# Patient Record
Sex: FEMALE | Race: WHITE | Employment: UNEMPLOYED | URBAN - METROPOLITAN AREA
[De-identification: names, ages, dates, MRNs, and addresses within clinical notes are randomized per-mention and may not be internally consistent; named-entity substitution may affect disease eponyms.]

---

## 2017-01-17 ENCOUNTER — TELEPHONE (OUTPATIENT)
Dept: ONCOLOGY | Age: 37
End: 2017-01-17

## 2017-01-17 ENCOUNTER — OFFICE VISIT (OUTPATIENT)
Dept: ENDOCRINOLOGY | Age: 37
End: 2017-01-17

## 2017-01-17 VITALS
DIASTOLIC BLOOD PRESSURE: 66 MMHG | HEART RATE: 77 BPM | SYSTOLIC BLOOD PRESSURE: 116 MMHG | RESPIRATION RATE: 16 BRPM | BODY MASS INDEX: 40.46 KG/M2 | OXYGEN SATURATION: 98 % | WEIGHT: 289 LBS | HEIGHT: 71 IN

## 2017-01-17 DIAGNOSIS — E66.01 OBESITY, MORBID, BMI 40.0-49.9 (HCC): ICD-10-CM

## 2017-01-17 DIAGNOSIS — Z87.42 HISTORY OF IRREGULAR MENSTRUAL CYCLES: ICD-10-CM

## 2017-01-17 DIAGNOSIS — Z87.898 HISTORY OF PREDIABETES: ICD-10-CM

## 2017-01-17 DIAGNOSIS — D3A.026 RECTAL CARCINOID TUMOR: ICD-10-CM

## 2017-01-17 DIAGNOSIS — E03.9 ACQUIRED HYPOTHYROIDISM: Primary | ICD-10-CM

## 2017-01-17 RX ORDER — LANOLIN ALCOHOL/MO/W.PET/CERES
400 CREAM (GRAM) TOPICAL DAILY
COMMUNITY
End: 2017-07-25

## 2017-01-17 RX ORDER — OMEPRAZOLE 20 MG/1
CAPSULE, DELAYED RELEASE ORAL
Refills: 2 | COMMUNITY
Start: 2016-11-25 | End: 2017-01-17

## 2017-01-17 NOTE — PATIENT INSTRUCTIONS
I recommend researching a low-carbohydrate diet as this way of eating can help improve your blood sugars and also help you lose weight. It can also help decrease your needs for diabetes medications including insulin. Here are some resources you can use to educate yourself on the diet:  1. Diet Doctor Website:   JDP The[employer]SeatXiimo/diabetes   https://Contextors.bodaplanes/  2. A Low Carbohydrate, Ketogenic Diet Manual by Alexys Ruano  3. New Atkins for a New You by Alexys Ruano and Kelly Eng  4. http://Phraxis/blog/7-steps-to-healthy-low-carb-living/    ==================================================================     AIM FOR LESS THAN 20 GRAMS OF NET CARBS PER DAY (for the first 4 weeks)  Net carbs = total carbs (g) - fiber (g)  Read food labels! Avoid food with added sugar. Focus on eating mostly protein (meat, poultry, fish, shellfish, eggs), healthy fats (avocados, nuts, cheese, olive or coconut oil) and non-starchy vegetables (greens, carrots, tomatoes, bell peppers, broccoli, brussels sprouts, green beans, etc).  If you fill yourself up with these foods, you won't even want the carbs.     BREAKFAST: whole eggs, veggies, omelet, plain yogurt, lee, sausage   LUNCH: salad with meat/poultry, veggies, cheese, nuts; low carb wrap with veggies and meat  DINNER: any type of meat/poultry or seafood (without breading), non-starchy vegetables     GOOD LOW-CARB SNACK OPTIONS: string cheese, Babybel cheese, carrots and celery with Ranch dressing, nuts (almonds, pistachios, walnuts, etc), meat (snack size salami, ham, or turkey), pork skins    BEVERAGES: water, water, water  Other options: unsweet tea (okay to add a pack of Splenda if needed), sparkling water without calories (ex: [employer], Gracia, etc), coffee (unsweeted creamer is fine)    Alcohol: (always in moderation) - lower carb options include red or white wine and champagne (the drier, the better); light beers like Michelob Ultra; some liquors (just avoid the sweet mixers like juices and sodas)

## 2017-01-17 NOTE — MR AVS SNAPSHOT
Visit Information Date & Time Provider Department Dept. Phone Encounter #  
 1/17/2017 10:50 AM Valentino Rummer, MD [facility] [phone] [id number] Follow-up Instructions Return in about 6 weeks (around 2/28/2017). Upcoming Health Maintenance Date Due  
 PAP AKA CERVICAL CYTOLOGY 7/12/2018 DTaP/Tdap/Td series (2 - Td) 3/24/2025 Allergies as of 1/17/2017  Review Complete On: 1/17/2017 By: Roberto Berry No Known Allergies Current Immunizations  Reviewed on 8/10/2015 Name Date Tdap 3/24/2015 Not reviewed this visit You Were Diagnosed With   
  
 Codes Comments Acquired hypothyroidism    -  Primary ICD-10-CM: E03.9 ICD-9-CM: 244.9 History of prediabetes     ICD-10-CM: Z87.898 ICD-9-CM: V12.29 History of irregular menstrual cycles     ICD-10-CM: Z87.42 
ICD-9-CM: V13.29 BMI 36.0-36.9,adult     ICD-10-CM: K71.73 
ICD-9-CM: V85.36 Rectal carcinoid tumor     ICD-10-CM: D3A.026 
ICD-9-CM: 209.57 Vitals BP Pulse Resp Height(growth percentile) Weight(growth percentile) SpO2  
 116/66 77 16 5' 11\" (1.803 m) 289 lb (131.1 kg) 98% BMI OB Status Smoking Status 40.31 kg/m2 Having regular periods Former Smoker Vitals History BMI and BSA Data Body Mass Index Body Surface Area  
 40.31 kg/m 2 2.56 m 2 Preferred Pharmacy Pharmacy Name Phone [id number] [place] [address] [phone] Your Updated Medication List  
  
   
This list is accurate as of: 1/17/17 12:01 PM.  Always use your most recent med list.  
  
  
  
  
 ALPRAZolam 1 mg tablet Commonly known as:  Carletha Puller Take 0.5-1 Tabs by mouth daily as needed for Anxiety. cyanocobalamin 1,000 mcg tablet Take 1,000 mcg by mouth daily. magnesium oxide 400 mg tablet Commonly known as:  MAG-OX Take 400 mg by mouth daily. PERFECT IRON 25 mg iron Tab Generic drug:  iron, carbonyl Take 1 Tab by mouth daily. PROBIOTIC ACIDOPHILUS BEADS 2 billion cell Cap Generic drug:  L.acidoph-B.long-L.plant-B.lac Take  by mouth daily. psyllium Powd Commonly known as:  METAMUCIL Take 1 Dose by mouth daily. VITAMIN D3 2,000 unit Cap capsule Generic drug:  Cholecalciferol (Vitamin D3) Take  by mouth daily. We Performed the Following 17-OH PROGESTERONE LCMS [id number] CPT(R)] 5-HIAA, UR G1027191 CPT(R)] Comments:  
 24 hour urine BASIC METABOLIC PANEL (7) [18111 CPT(R)] CORTISOL, URINE FREE 24 HR [51568 CPT(R)] CREATININE, UR, 24 HR [28835 CPT(R)] FERRITIN [13471 CPT(R)] HEMOGLOBIN A1C WITH EAG [38154 CPT(R)] MAGNESIUM [id number] CPT(R)] PROLACTIN [48369 CPT(R)] TESTOSTERONE, TOTAL, FEMALE/CHILD [id number] CPT(R)] TSH AND FREE T4 [66363 CPT(R)] VITAMIN B12 [id number] CPT(R)] VITAMIN D, 25 HYDROXY [id number] CPT(R)] Follow-up Instructions Return in about 6 weeks (around 2/28/2017). Patient Instructions I recommend researching a low-carbohydrate diet as this way of eating can help improve your blood sugars and also help you lose weight. It can also help decrease your needs for diabetes medications including insulin. Here are some resources you can use to educate yourself on the diet: 1. Diet Doctor Website: 
 Mocha.cnSeat.Differential. Le[employer]/diabetes 
 https://steward.org/ 2. A Low Carbohydrate, Ketogenic Diet Manual by Sofía Orellana 3. New Atkins for a New You by Sofía Orellana and Becki Kowalski 4. http://Elucid [employer]imaging.Leap[employer]/blog/7-steps-to-healthy-low-carb-living/ 
 
================================================================== 
  
AIM FOR LESS THAN 20 GRAMS OF NET CARBS PER DAY (for the first 4 weeks) Net carbs = total carbs (g) - fiber (g) Read food labels! Avoid food with added sugar. Focus on eating mostly protein (meat, poultry, fish, shellfish, eggs), healthy fats (avocados, nuts, cheese, olive or coconut oil) and non-starchy vegetables (greens, carrots, tomatoes, bell peppers, broccoli, brussels sprouts, green beans, etc). If you fill yourself up with these foods, you won't even want the carbs. 
  
BREAKFAST: whole eggs, veggies, omelet, plain yogurt, lee, sausage LUNCH: salad with meat/poultry, veggies, cheese, nuts; low carb wrap with veggies and meat DINNER: any type of meat/poultry or seafood (without breading), non-starchy vegetables 
  
GOOD LOW-CARB SNACK OPTIONS: string cheese, Babybel cheese, carrots and celery with Ranch dressing, nuts (almonds, pistachios, walnuts, etc), meat (snack size salami, ham, or turkey), pork skins BEVERAGES: water, water, water Other options: unsweet tea (okay to add a pack of Splenda if needed), sparkling water without calories (ex: [place], Rosezetta Brown, etc), coffee (unsweeted creamer is fine) Alcohol: (always in moderation) - lower carb options include red or white wine and champagne (the drier, the better); light beers like Michelob Ultra; some liquors (just avoid the sweet mixers like juices and sodas) Introducing [facility]! Dear Jennifer Carrillo: Thank you for requesting a Summize account. Our records indicate that you already have an active Summize account. You can access your account anytime at [email or web address] Did you know that you can access your hospital and ER discharge instructions at any time in Summize? You can also review all of your test results from your hospital stay or ER visit. Additional Information If you have questions, please visit the Frequently Asked Questions section of the Summize website at [email or web address] Remember, Summize is NOT to be used for urgent needs. For medical emergencies, dial 911. Now available from your iPhone and Android! Please provide this summary of care documentation to your next provider. Your primary care clinician is listed as [facility]. If you have any questions after today's visit, please call [phone].

## 2017-01-17 NOTE — PROGRESS NOTES
Endocrinology Visit    Chief Complaint: fatigue, weight gain    History of Present Illness:  Asia Paez is a 31 y.o. female who returns for f/u of fatigue and obesity with abnormal weight gain. I saw her in initial consultation in October 2016 at which time I started a work-up to determine if there was a hormonal cause of her symptoms. Thyroid levels, thyroid antibodies, cortisol/ACTH, and testosterone levels all returned within normal limits. Vitamin D, B12, and ferritin were also normal. We did not start any specific therapies since no specific etiology was identified. In the interim, she quit her previous job and switched to a less stressful job. Now works from 4p to midnight. She reports still feeling fatigued and says it is hard for her to wake up in the morning despite getting a full night's sleep. Weight is up 13 lbs since her last visit. She has not resumed her exercise routine, feels too tired to go to the gym. She continues taking B12 1000mg daily, magnesium 400 mg daily, vitamin D3 6000 IU daily, and comfort iron - feels more fatigued if she skips a day. She also reports having a positive pregnancy test in the interim. Unfortunately several f/u UPTs and a serum hCG were negative. During the work-up by her Ob/Gyn, a prolactin level was checked and was found to be slightly elevated (results were not faxed to me and pt does not know the degree of elevation). She was not started on dopamine agonist therapy. Seeing Dr Harman Morales for ob/gyn now. She remains interested in fertility. She has a h/o rectal carcinoid s/p surgery in Oct 2014 but no chemo or XRT. It has been two years since her dx and most recent octreoscan showed no evidence of disease. Has f/u with Dr Sea Mitchell soon for surveillance studies. Maximum weight was 375 lbs and after her cancer diagnosis she had been working on weight loss - lost over 100 lbs over 2 years.  Achieved this by eating more of a plant based diet and avoiding fast food, processed foods, and sodas. Was working out with a  at [employer]. As above, she has been unable to work out for the past 6 months due to profound fatigue. Recent home sleep study was negative for sleep apnea. Menses are regular now but have been very irregular and heavy in the past. Feels the weight loss helped her cycles become more regular. Has tried IVF twice but has been unable to maintain the pregnancy (two miscarriages). TFTs checked in the past have been normal but she reports a personal and family history of hypothyroidism. No longer on any thyroid medication. Morning cortisol level was checked around 8am and was 8.7. Pt was using clobetasol cream for eczema at that time but is no longer using this medication. Repeat random cortisol level was normal.     Review of Systems:   General ROS: positive for  - weight gain (regained about 20 lbs total)  Ophthalmic ROS: negative  ENT ROS: negative  Endocrine ROS: negative for - hair pattern changes, polydipsia/polyuria or unexpected weight changes  Respiratory ROS: no cough, shortness of breath, or wheezing  Cardiovascular ROS: no chest pain or dyspnea on exertion  Gastrointestinal ROS: no abdominal pain, change in bowel habits (has chronic loose stools), or black or bloody stools  Genito-Urinary ROS: no dysuria, trouble voiding, or hematuria  Musculoskeletal ROS: negative  Neurological ROS: negative  Dermatological ROS: negative    Problem List:  Patient Active Problem List   Diagnosis Code    Pap smear for cervical cancer screening Z12.4    Ovarian cyst N83.209    Uterine polyp N84.0    Endometriosis N80.9    Septic shock(785.52)     Asthma J45.909    Miscarriage O03.9    In vitro fertilization Z31.83    Hypothyroid E03.9    Anxiety F41.9    Rectal carcinoid tumor D3A. 026    QT prolongation R94.31    Benign essential hypertension I10    Vitamin D deficiency E55.9    Hypertriglyceridemia E78.1    Incisional hernia K43.2    History of prediabetes Z87.898    BMI 36.0-36.9,adult Z68.36    Chronic fatigue R53.82    History of irregular menstrual cycles Z87.42       Past Medical History:    Past Medical History   Diagnosis Date    Anxiety     Asthma     Benign essential hypertension 3/24/15     resolved with weight loss and dietary changes    BMI 36.0-36.9,adult     Endometriosis     History of prediabetes      resolved with diet, exercise and wt loss    Hypertriglyceridemia 3/28/15    Hypothyroid      treated with Levothyroxine x3 months then stopped. TFT have been normal since    In vitro fertilization      x2    Miscarriage      x2    Ovarian cyst     QT prolongation      d/t Trazodone; resolved after Trazodone discontinued    Rectal carcinoid tumor 2014     stage IIIB; colonic resection, lymph nodes removed, ileostomy 10/2014 (Dr. Haylee Fregoso); ileostomy take-down 2/3/15. No chemo/XRT. Sees primarily Dr. Cynthia Rosenthal at [facility]. Also sees Dr. Yesenia Martínez here if needed    Septic shock(785.52) 3379     d/t complications from [facility] , laparoscopy - bowel was lacerated, exploratory surgery    Uterine polyp     Vitamin D deficiency 3/28/15       Past Surgical History:  Past Surgical History   Procedure Laterality Date    Hx polypectomy  2014 (x2)     2 rectal polyps (Dr. Fadumo Siddiqi, Dr. Israel Jean-Baptiste)    Hx colectomy  10/2014     rectosigmoid colon resection, ileostomy (Dr. Haylee Fregoso)    Hx gi  2/3/15     ileostomy take-down (Dr. Haylee Fregoso)    Hx gyn       laproscopy and D&C - bowel knicked thus had full exploratory    Hx gyn       IVF x 2    Hx gyn       A2 (miscarriage)    Hx other surgical  8/10/15     incisional hernia repair with mesh, lysis of adhesions (Dr. Haylee Fregoso)       Social History:  Social History     Social History    Marital status:      Spouse name: N/A    Number of children: N/A    Years of education: N/A     Occupational History    Not on file.      Social History Main Topics    Smoking status: Former Smoker     Quit date: 10/12/2000    Smokeless tobacco: Never Used      Comment: smoked cigarettes as a teenager - light smoker then    Alcohol use No    Drug use: No    Sexual activity: Yes     Partners: Male     Birth control/ protection: None     Other Topics Concern    Not on file     Social History Narrative    ** Merged History Encounter **         She is . No children. Works at [employer] One. Family History:  Family History   Problem Relation Age of Onset    Diabetes Mother     Hypertension Mother     Cancer Mother      uterine    Diabetes Father     Hypertension Father     Hypertension Brother     Diabetes Maternal Aunt     Heart Disease Maternal Aunt     Heart Attack Maternal Aunt     Diabetes Maternal Uncle     Heart Disease Maternal Uncle     Heart Attack Maternal Uncle     Stroke Paternal Grandmother     Colon Cancer Paternal Grandfather     Hypertension Brother     Other Brother      commited suicide    Cancer Maternal Aunt      colon     Cancer Maternal Aunt      lymphoma    Anesth Problems Neg Hx        Medications:     Current Outpatient Prescriptions:     magnesium oxide (MAG-OX) 400 mg tablet, Take 400 mg by mouth daily. , Disp: , Rfl:     L.acidoph-B.long-L.plant-B.lac (PROBIOTIC ACIDOPHILUS BEADS) 2 billion cell cap, Take  by mouth daily. , Disp: , Rfl:     iron, carbonyl (PERFECT IRON) 25 mg iron tab, Take 1 Tab by mouth daily. , Disp: , Rfl:     ALPRAZolam (XANAX) 1 mg tablet, Take 0.5-1 Tabs by mouth daily as needed for Anxiety. , Disp: 30 Tab, Rfl: 0    cyanocobalamin 1,000 mcg tablet, Take 1,000 mcg by mouth daily. , Disp: , Rfl:     psyllium (METAMUCIL) powd, Take 1 Dose by mouth daily. , Disp: , Rfl:     Cholecalciferol, Vitamin D3, (VITAMIN D3) 2,000 unit cap capsule, Take  by mouth daily. , Disp: , Rfl:     omeprazole (PRILOSEC) 20 mg capsule, TAKE ONE CAPSULE BY MOUTH EVERY DAY, Disp: , Rfl: 2    Allergies:  No Known Allergies    Physical Examination:  Visit Vitals    /66    Pulse 77    Resp 16    Ht 5' 11\" (1.803 m)    Wt 289 lb (131.1 kg)    SpO2 98%    BMI 40.31 kg/m2     Gen: no acute distress  HEENT: mucous membranes moist, no Cushionoid or acromegalic features  Thyroid: no enlargement or nodules noted  CAD: normal rate, regular rhythm. No murmur rubs or gallops  PULM: clear to ausculation, no wheezes, rhonchis or rales. GI: soft non tender, non distended, + flesh colored striae but no violaceous striae; multiple well-healed scars  EXT: no clubbing, cyanosis or edema  Neuro: grossly non focal, normal DTRs  Psych: pleasant, good insight into medical hx  Skin: warm, dry, no rashes    Clinical Data Review:    Component      Latest Ref Rng 8/27/2016 8/25/2016 6/8/2016 1/22/2015           8:18 AM 12:53 PM  5:30 PM 10:45 AM   TSH-ICMA      uU/mL    3.1   Free T4      ng/dL    0.94   TSH      0.36 - 3.74 uIU/mL   2.75    ACTH, plasma      7.2 - 63.3 pg/mL  18.4     Cortisol, random      ug/dL 8.7          Component      Latest Ref Rn 10/29/2016 10/29/2016 10/29/2016          12:00 AM 12:00 AM 12:00 AM   TSH      0.450 - 4.500 uIU/mL   2.210   T4, Free      0.82 - 1.77 ng/dL   1.31   Thyroid peroxidase Ab      0 - 34 IU/mL  <6    Thyroglobulin Ab      0.0 - 0.9 IU/mL  <1.0    Triiodothyronine (T3), free      2.0 - 4.4 pg/mL 3.4       Component      Latest Ref Rn 10/29/2016 10/29/2016 10/29/2016          12:00 AM 12:00 AM 12:00 AM   VITAMIN D, 25-HYDROXY      30.0 - 100.0 ng/mL   29.7 (L)   Ferritin      15 - 150 ng/mL  40    Vitamin B12      211 - 946 pg/mL 326       Component      Latest Ref Rng 10/29/2016 10/29/2016 10/29/2016          12:00 AM 12:00 AM 12:00 AM   Testosterone, Serum (Total)      10.0 - 55.0 ng/dL   19.5   Cortisol, random      ug/dL  9.1    ACTH, plasma      7.2 - 63.3 pg/mL 19.4         Assessment and Plan:  Patient is a 31y.o. year old female here for unexplained fatigue and weight gain. 1. Chronic fatigue   Will re-evaluate for hormonal causes including hypothyroidism. Also eval for nutritional factors by checking ferritin, B12, and vit D (see orders placed below) - given her h/o bowel resection. She is now taking supplements and would like to see if she is adequately absorbing these nutrients. 2. BMI over 40: has been successful with weight loss in the past but has now regained weight, presumably due to lapse in dietary control, reduced exercise, and increased stress. Will check 24 hr urine for cortisol to exclude Cushings. We also discussed possible weight loss strategies including ketogenic diet and pharmacologic therapies. Advised her to start with diet and resume a regular exercise regimen. We can consider pharmacologic agents in the future should lifestyle modification fail to achieve adequate results (goal BMI <35, ideally <30). F/u in 6 weeks for accountability to monitor her progress. 3. History of irregular menstrual cycles: likely impacting her fertility pursuits. Suspect PCOS may be playing a role- will re-check testosterone and A1c today given her weight gain. Will also check prolactin since it was reportedly elevated. Also check 17OHP. 4.      History of carcinoid tumor: f/u Dr Erika Zavala. Since she is collecting a 24h urine for cortisol for me, will add    5-HIAA to the urine analysis as she reports this is included in her annual surveillance (will forward   Results to Dr Erika Zavala). Orders Placed This Encounter    TSH AND FREE T4    PROLACTIN    VITAMIN B12    VITAMIN D, 25 HYDROXY    HEMOGLOBIN A1C WITH EAG    TESTOSTERONE, TOTAL, FEMALE/CHILD    17-OH PROGESTERONE LCMS    FERRITIN    MAGNESIUM    BASIC METABOLIC PANEL (7)    CORTISOL, URINE FREE 24 HR    CREATININE, UR, 24 HR     Patient verbalized an understanding and will return to clinic in 6 weeks.    I spent 40 minutes with the patient today and > 50% of the time was spent counseling the patient about causes of fatigue and weight management strategies. Thank you for the opportunity to participate in this patient's care. Angella Fierro MD  [facility]  [address] [place] [address]    ------------------------------------------------------------------------  [facility] 1/26/17:    Labs sent to pt via [facility]. Everything looks fairly normal. Awaiting 24h urine results.     Lab Results   Component Value Date/Time    Hemoglobin A1c 5.3 01/20/2017 02:11 PM    Hemoglobin A1c (POC) 5.5 09/12/2013 03:47 PM     Lab Results   Component Value Date/Time    TSH 2.020 01/20/2017 02:11 PM    Triiodothyronine (T3), free 3.4 10/29/2016 12:00 AM    T4, Free 1.39 01/20/2017 02:11 PM     Lab Results   Component Value Date/Time    Prolactin 14.8 01/20/2017 02:11 PM     Lab Results   Component Value Date/Time    Sodium 139 01/20/2017 02:11 PM    Potassium 4.4 01/20/2017 02:11 PM    Chloride 101 01/20/2017 02:11 PM    CO2 24 01/20/2017 02:11 PM    Anion gap 8 11/17/2016 04:02 PM    Glucose 90 01/20/2017 02:11 PM    BUN 13 01/20/2017 02:11 PM    Creatinine 0.54 01/20/2017 02:11 PM    BUN/Creatinine ratio 24 01/20/2017 02:11 PM    GFR est  01/20/2017 02:11 PM    GFR est non- 01/20/2017 02:11 PM    Calcium 8.4 11/17/2016 04:02 PM     Lab Results   Component Value Date/Time    VITAMIN D, 25-HYDROXY 41.6 01/20/2017 02:11 PM      Lab Results   Component Value Date/Time    Vitamin B12 645 01/20/2017 02:11 PM     Lab Results   Component Value Date/Time    Ferritin 70 01/20/2017 02:11 PM     Component      Latest Ref Rng & Units 1/20/2017 1/20/2017           2:11 PM  2:11 PM   Testosterone, Serum (Total)      10.0 - 55.0 ng/dL  33.7   17-OH Progesterone      ng/dL 147

## 2017-01-20 ENCOUNTER — TELEPHONE (OUTPATIENT)
Dept: ONCOLOGY | Age: 37
End: 2017-01-20

## 2017-01-20 DIAGNOSIS — D3A.026 RECTAL CARCINOID TUMOR: Primary | ICD-10-CM

## 2017-01-20 NOTE — TELEPHONE ENCOUNTER
Patient is calling back a second time to check status of her scans. She states that she is due to have scans done and wants to know if they have been ordered. Please advise.

## 2017-01-26 LAB
17OHP SERPL-MCNC: 147 NG/DL
25(OH)D3+25(OH)D2 SERPL-MCNC: 41.6 NG/ML (ref 30–100)
5OH-INDOLEACETATE 24H UR-MCNC: 1.2 MG/L
5OH-INDOLEACETATE 24H UR-MRATE: 3.6 MG/24 HR (ref 0–14.9)
BUN SERPL-MCNC: 13 MG/DL (ref 6–20)
BUN/CREAT SERPL: 24 (ref 8–20)
CHLORIDE SERPL-SCNC: 101 MMOL/L (ref 96–106)
CO2 SERPL-SCNC: 24 MMOL/L (ref 18–29)
CREAT SERPL-MCNC: 0.54 MG/DL (ref 0.57–1)
EST. AVERAGE GLUCOSE BLD GHB EST-MCNC: 105 MG/DL
FERRITIN SERPL-MCNC: 70 NG/ML (ref 15–150)
GLUCOSE SERPL-MCNC: 90 MG/DL (ref 65–99)
HBA1C MFR BLD: 5.3 % (ref 4.8–5.6)
MAGNESIUM SERPL-MCNC: 1.9 MG/DL (ref 1.6–2.3)
POTASSIUM SERPL-SCNC: 4.4 MMOL/L (ref 3.5–5.2)
PROLACTIN SERPL-MCNC: 14.8 NG/ML (ref 4.8–23.3)
SODIUM SERPL-SCNC: 139 MMOL/L (ref 134–144)
T4 FREE SERPL-MCNC: 1.39 NG/DL (ref 0.82–1.77)
TESTOST SERPL-MCNC: 33.7 NG/DL (ref 10–55)
TSH SERPL DL<=0.005 MIU/L-ACNC: 2.02 UIU/ML (ref 0.45–4.5)
VIT B12 SERPL-MCNC: 645 PG/ML (ref 211–946)

## 2017-01-29 LAB
CORTIS F 24H UR-MRATE: 20 UG/24 HR (ref 0–50)
CORTIS F UR-MCNC: 11 UG/L
CREAT 24H UR-MRATE: 1899 MG/24 HR (ref 800–1800)
CREAT UR-MCNC: 105.5 MG/DL

## 2017-01-30 ENCOUNTER — HOSPITAL ENCOUNTER (OUTPATIENT)
Dept: CT IMAGING | Age: 37
Discharge: HOME | End: 2017-01-30
Attending: NURSE PRACTITIONER
Payer: COMMERCIAL

## 2017-01-30 DIAGNOSIS — D3A.026 RECTAL CARCINOID TUMOR: ICD-10-CM

## 2017-01-30 PROCEDURE — 74177 CT ABD & PELVIS W/CONTRAST: CPT

## 2017-01-30 PROCEDURE — 74011636320 HC RX REV CODE- 636/320: Performed by: NURSE PRACTITIONER

## 2017-01-30 PROCEDURE — 71260 CT THORAX DX C+: CPT

## 2017-01-30 PROCEDURE — 74011000258 HC RX REV CODE- 258: Performed by: NURSE PRACTITIONER

## 2017-01-30 RX ORDER — SODIUM CHLORIDE 0.9 % (FLUSH) 0.9 %
10 SYRINGE (ML) INJECTION
Status: COMPLETED | OUTPATIENT
Start: 2017-01-30 | End: 2017-01-30

## 2017-01-30 RX ADMIN — IOHEXOL 50 ML: 240 INJECTION, SOLUTION INTRATHECAL; INTRAVASCULAR; INTRAVENOUS; ORAL at 09:00

## 2017-01-30 RX ADMIN — IOPAMIDOL 100 ML: 755 INJECTION, SOLUTION INTRAVENOUS at 10:01

## 2017-01-30 RX ADMIN — SODIUM CHLORIDE 100 ML: 900 INJECTION, SOLUTION INTRAVENOUS at 10:01

## 2017-01-30 RX ADMIN — Medication 10 ML: at 10:01

## 2017-02-16 ENCOUNTER — OFFICE VISIT (OUTPATIENT)
Dept: ONCOLOGY | Age: 37
End: 2017-02-16

## 2017-02-16 VITALS
HEIGHT: 71 IN | BODY MASS INDEX: 40.6 KG/M2 | HEART RATE: 83 BPM | DIASTOLIC BLOOD PRESSURE: 75 MMHG | WEIGHT: 290 LBS | SYSTOLIC BLOOD PRESSURE: 108 MMHG | RESPIRATION RATE: 18 BRPM | TEMPERATURE: 97.8 F

## 2017-02-16 DIAGNOSIS — R53.82 CHRONIC FATIGUE: ICD-10-CM

## 2017-02-16 DIAGNOSIS — D3A.026 RECTAL CARCINOID TUMOR: Primary | ICD-10-CM

## 2017-02-16 RX ORDER — LEVONORGESTREL AND ETHINYL ESTRADIOL 0.1-0.02MG
KIT ORAL
COMMUNITY
Start: 2017-02-07 | End: 2017-07-25

## 2017-02-16 NOTE — PROGRESS NOTES
Hematology/Oncology Progress Note    REASON FOR VISIT: Rectal Carcinoid    HISTORY OF PRESENT ILLNESS: Ms. Chen Mariee is a 31 y.o. female who presents for follow-up of rectal carcinoid. Had a colonoscopy under the care of Dr. Elicia Farr in July 2014 for rectal bleeding. She had a polyp at 9cm from anal verge. Pathology revealed a 2.2cm rectal carcinoid. EUS by Dr. Dhara Smiley on 9/23/14 without any appreciable adenoathy and with good preservation of rectal layers. Was then sent to see Dr. Tori Santoyo who did a rectosigmoid colon resection on October 30, 2014. Tells me she recovered very well from surgery. She had an ileostomy and underwent reversal on 2/3/15. Presents today to for follow-up. Still with fatigue which has been bothering her - there is no change in this. Has followed with endocrinologist.  Has gained weight since I saw her last due to fatigue. Has been going to the [facility] for the Dotatate-PET study. Plan through the [facility] is annual surveillance - last PET in September 2016. No fevers, chills, night sweats. No new aches or pains. No new lumps or bumps. No CP/SOB/AMADOR. No nausea, vomiting, diarrhea, or constipation. No bleeding. No Known Allergies    Current Outpatient Prescriptions   Medication Sig Dispense Refill    ORSYTHIA 0.1-20 mg-mcg tab       magnesium oxide (MAG-OX) 400 mg tablet Take 400 mg by mouth daily.  L.acidoph-B.long-L.plant-B.lac (PROBIOTIC ACIDOPHILUS BEADS) 2 billion cell cap Take  by mouth daily.  iron, carbonyl (PERFECT IRON) 25 mg iron tab Take 1 Tab by mouth daily.  ALPRAZolam (XANAX) 1 mg tablet Take 0.5-1 Tabs by mouth daily as needed for Anxiety. 30 Tab 0    cyanocobalamin 1,000 mcg tablet Take 1,000 mcg by mouth daily.  psyllium (METAMUCIL) powd Take 1 Dose by mouth daily.  Cholecalciferol, Vitamin D3, (VITAMIN D3) 2,000 unit cap capsule Take  by mouth daily. ROS  As per the HPI, otherwise a comprehensive ROS is negative.   ECOG PS is 0.  Emotional well being addressed and patient is coping well. Physical Examination:   Visit Vitals    /75 (BP 1 Location: Left arm, BP Patient Position: Sitting)    Pulse 83    Temp 97.8 °F (36.6 °C) (Oral)    Resp 18    Ht 5' 11\" (1.803 m)    Wt 290 lb (131.5 kg)    BMI 40.45 kg/m2     General appearance - alert, well appearing, and in no distress  Mental status - oriented to person, place, and time  Mouth - mucous membranes moist, pharynx normal without lesions  Neck - supple, no significant adenopathy  Lymphatics - no palpable lymphadenopathy, no hepatosplenomegaly  Chest - clear to auscultation, no wheezes, rales or rhonchi, symmetric air entry  Heart - normal rate, regular rhythm, normal S1, S2, no murmurs, rubs, clicks or gallops  Abdomen - ileostomy, otherwise, soft, nontender, nondistended, no masses or organomegaly, bowel sounds present  Neurological - normal speech, no focal findings or movement disorder noted  Musculoskeletal - no joint tenderness, deformity or swelling  Extremities - peripheral pulses normal, no pedal edema, no clubbing or cyanosis  Skin - normal coloration and turgor, no rashes, no suspicious skin lesions noted    LABS  Lab Results   Component Value Date/Time    WBC 9.6 11/17/2016 04:02 PM    HGB 13.3 11/17/2016 04:02 PM    HCT 39.0 11/17/2016 04:02 PM    PLATELET 705 28/71/6893 04:02 PM    MCV 89.2 11/17/2016 04:02 PM    ABS.  NEUTROPHILS 6.2 11/17/2016 04:02 PM     Lab Results   Component Value Date/Time    Sodium 139 01/20/2017 02:11 PM    Potassium 4.4 01/20/2017 02:11 PM    Chloride 101 01/20/2017 02:11 PM    CO2 24 01/20/2017 02:11 PM    Glucose 90 01/20/2017 02:11 PM    BUN 13 01/20/2017 02:11 PM    Creatinine 0.54 01/20/2017 02:11 PM    GFR est  01/20/2017 02:11 PM    GFR est non- 01/20/2017 02:11 PM    Calcium 8.4 11/17/2016 04:02 PM    BUN (POC) 14 03/07/2016 02:08 PM    Calcium, ionized (POC) 1.14 03/07/2016 02:08 PM     Lab Results   Component Value Date/Time    AST (SGOT) 11 11/17/2016 04:02 PM    Alk. phosphatase 69 11/17/2016 04:02 PM    Protein, total 7.3 11/17/2016 04:02 PM    Albumin 3.7 11/17/2016 04:02 PM    Globulin 3.6 11/17/2016 04:02 PM    A-G Ratio 1.0 11/17/2016 04:02 PM     Lab Results   Component Value Date/Time    Iron % saturation 22 03/27/2015 03:03 PM    Iron 64 03/27/2015 03:03 PM    TIBC 295 03/27/2015 03:03 PM    Ferritin 70 01/20/2017 02:11 PM    Vitamin B12 645 01/20/2017 02:11 PM    Sed rate (ESR) 9 06/15/2016 10:18 AM    TSH 2.020 01/20/2017 02:11 PM    Lipase 123 05/27/2015 02:57 PM     PATHOLOGY  Rectal polyp on 9/23/14 ([id number]) with well-differentiated neuroendocrine neoplasm. Rectosigmoid colon resection 10/30/14 ([id number]) 2mm focus of well differentiated neuroendocrine neoplasm (this was from the original biopsy) with 4 of 39 nodes positive for metastatic well-differentiated neuroendocrine neoplasm. Pathologic stage T1N1. IMAGING  CT chest/abdomen/pelvis on 1/30/17 with no evidence of a metastasis. New 5.0 cm lesion in the right ovary with layering hyperdensity suggestive of a hemorrhagic cyst.   CT chest/abdomen/pelvis on 8/18/15 with no sign of cancer. Dotatate scan on 8/14/15 with no evidence of somatostatin receptor presenting tumor. CT chest/abdomen/pelvis on 3/20/15 with no sign of cancer. CT chest/abdomen/pelvis on 1/27/15 with posterior right hepatic lobe segment 7 wedge-shaped hyperdensity in the subcapsular region present on both arterial and portal venous phases and  questionably on the unenhanced phase may represent atypical focal fatty sparing or perfusion anomaly. No definite liver metastases or focal arterial enhancing lesion is seen. No evidence for metastatic disease in the chest.   Octreotide scan on 11/18/14 with no evidence of metastatic disease. CT Abdomen/Pelvis on 10/10/14 with no definite metastatic identified.  No definite abnormality seen in the rectum, however the rectum is under distended. Multiple nonspecific, mildly enlarged mesenteric lymph nodes. Followup is recommended. Hepatic steatosis. ASSESSMENT  Ms. Joseph Garcia is a 31 y.o. female who presents for evaluation of Stage IIIB (T2 N1 M0) rectal carcinoid who presents for follow-up. DISCUSSION/PLAN  1. Rectal carcinoid. There is no sign of recurrence. Plan through the [facility] is annual surveillance. The NCCN guidelines recommend surveillance every 6-12 months. Right now the plan is for imaging at [place] [id number] every year, imaging through me every year, this will result in q6 month imaging. In the past we have done MRI. I'm fine with either CT or MRI in a year. If CT, will order as multiphase imaging with oral water contrast.     2. Ovarian cyst.  This was present on her CT in January. Has been following with gyn. Repeat US showed the cyst had collapsed.      3. Fatigue. Unfortunately, I don't see a clear cause for this. Has seen endocrinology. Plan to see her annually.      Peri Epperson MD

## 2017-02-16 NOTE — MR AVS SNAPSHOT
Visit Information Date & Time Provider Department Dept. Phone Encounter #  
 2/16/2017 10:30 AM Wei Patricio MD [address] [place] [address] Oncology at [facility] [phone] Your Appointments 3/6/2017 12:10 PM  
ROUTINE CARE with Margot Rutherford MD  
[facility]) Appt Note: f/u fatigue cp15.00  
 [address] [place] [address], [facility] [address] [facility] [address] [phone] Upcoming Health Maintenance Date Due  
 PAP AKA CERVICAL CYTOLOGY 7/12/2018 DTaP/Tdap/Td series (2 - Td) 3/24/2025 Allergies as of 2/16/2017  Review Complete On: 2/16/2017 By: Wei Patricio MD  
 No Known Allergies Current Immunizations  Reviewed on 8/10/2015 Name Date Tdap 3/24/2015 Not reviewed this visit You Were Diagnosed With   
  
 Codes Comments Rectal carcinoid tumor    -  Primary ICD-10-CM: D3A.026 
ICD-9-CM: 209.57 Vitals BP Pulse Temp Resp Height(growth percentile) Weight(growth percentile) 108/75 (BP 1 Location: Left arm, BP Patient Position: Sitting) 83 97.8 °F (36.6 °C) (Oral) 18 5' 11\" (1.803 m) 290 lb (131.5 kg) BMI OB Status Smoking Status 40.45 kg/m2 Having regular periods Former Smoker BMI and BSA Data Body Mass Index Body Surface Area 40.45 kg/m 2 2.57 m 2 Preferred Pharmacy Pharmacy Name Phone [id number] [id number] [place] [address] [phone] Your Updated Medication List  
  
   
This list is accurate as of: 2/16/17 11:22 AM.  Always use your most recent med list.  
  
  
  
  
 ALPRAZolam 1 mg tablet Commonly known as:  Kaleta Yesica Take 0.5-1 Tabs by mouth daily as needed for Anxiety. cyanocobalamin 1,000 mcg tablet Take 1,000 mcg by mouth daily. magnesium oxide 400 mg tablet Commonly known as:  MAG-OX Take 400 mg by mouth daily. ORSYTHIA 0.1-20 mg-mcg Tab Generic drug:  levonorgestrel-ethinyl estradiol PERFECT IRON 25 mg iron Tab Generic drug:  iron, carbonyl Take 1 Tab by mouth daily. PROBIOTIC ACIDOPHILUS BEADS 2 billion cell Cap Generic drug:  L.acidoph-B.long-L.plant-B.lac Take  by mouth daily. psyllium Powd Commonly known as:  METAMUCIL Take 1 Dose by mouth daily. VITAMIN D3 2,000 unit Cap capsule Generic drug:  Cholecalciferol (Vitamin D3) Take  by mouth daily. To-Do List   
 01/16/2018 Imaging:  CT CHEST W CONT AND ABD W WO CONT Introducing [facility]! Dear Elizbeth Dakins: Thank you for requesting a [email or web address] account. Our records indicate that you already have an active [email or web address] account. You can access your account anytime at [email or web address] Did you know that you can access your hospital and ER discharge instructions at any time in [email or web address]? You can also review all of your test results from your hospital stay or ER visit. Additional Information If you have questions, please visit the Frequently Asked Questions section of the [email or web address] website at [email or web address] Remember, [email or web address] is NOT to be used for urgent needs. For medical emergencies, dial 911. Now available from your iPhone and Android! Please provide this summary of care documentation to your next provider. Your primary care clinician is listed as Anamaria Chaudhary. If you have any questions after today's visit, please call [phone].

## 2017-03-14 ENCOUNTER — OFFICE VISIT (OUTPATIENT)
Dept: INTERNAL MEDICINE CLINIC | Age: 37
End: 2017-03-14

## 2017-03-14 VITALS
RESPIRATION RATE: 18 BRPM | HEIGHT: 71 IN | OXYGEN SATURATION: 97 % | WEIGHT: 293 LBS | HEART RATE: 98 BPM | SYSTOLIC BLOOD PRESSURE: 126 MMHG | BODY MASS INDEX: 41.02 KG/M2 | TEMPERATURE: 97.6 F | DIASTOLIC BLOOD PRESSURE: 70 MMHG

## 2017-03-14 DIAGNOSIS — R53.82 CHRONIC FATIGUE: Primary | ICD-10-CM

## 2017-03-14 DIAGNOSIS — E66.3 OVERWEIGHT: ICD-10-CM

## 2017-03-14 DIAGNOSIS — J45.20 MILD INTERMITTENT ASTHMA WITHOUT COMPLICATION: ICD-10-CM

## 2017-03-14 RX ORDER — ALBUTEROL SULFATE 90 UG/1
90 POWDER, METERED RESPIRATORY (INHALATION) DAILY
Refills: 3 | COMMUNITY
Start: 2017-02-21 | End: 2017-07-25

## 2017-03-14 RX ORDER — MONTELUKAST SODIUM 10 MG/1
10 TABLET ORAL DAILY
Refills: 0 | COMMUNITY
Start: 2017-02-21 | End: 2017-07-25

## 2017-03-14 NOTE — PROGRESS NOTES
Establish Care       HPI:  Rosa Peters is a 31y.o. year old female who is here to establish care. She  had her medical care:     She reports the following history and medical concerns:       Dr. Rashaun Kim- hypothyroidism in the past/ prediabetes. - no active endocrine issues. Hx of rectal carcinoid s/p surgery in Oct 2014. Rectal bleeding and extreme fatigue (was about 400 lbs). Flushing and diarrhea. Sees Dr. Ana Rosa Garcia (neuroendocrinologist) in [place], and also part of research study. Loop ileostomy and subsequent hernia repair in [facility] and where ostomy with MESH. Sigmoid (foot size) and rectum (metastasized to four LN)     2001- diagnosed with endometriosis s/p D and C and had nicked bowel. Full sepsis and periotonitis. Had full exploratory surgery. Developed pulmonary edema with effusion s/p thoracentesis (right). Sees GYN- Dr. Colin Prasad. Sees fertility doctor with 2 failed IVF attempts.     Asthma- Dr. Tiffany Marcial. Nellie. Environmental allergies. Uses inhaler 2 puffs daily. Saw pulmonologist today because of PFT's 70% Impression: no abnormalities. Dr. Tony Ball with GYN.     When she exercises, she does feel chest is tight and cough.     Takes xanax one every 3 months for scans.     Prolonged QT history.     Sleep- 8-9 hrs (still fatigue) 45 minutes nap and never feels refreshed. Does report feeling in legs spasms at night and  says she is always tossing and turning with legs. She feels no pain but does admit to the restless symptoms. Assessment and Plan        1. Chronic fatigue  ? possible RLS. She sleeps but doesn't get a restorative sleep. Mother has RLS also which makes me think she has this. The risks and benefits of the new medication were discussed as well as possible side effects.   Patient is instructed to call if any new symptoms arise that are listed in the AVS printed or available on [employer] or discussed:  Rash, dizziness, somnolence, diarrhea. requip 0.25 mg once a day for 2 days and then increase to 0.5 mg once daily. Rx written since computer was down (internet) at time of rx. I have reviewed/discussed the above normal BMI with the patient. I have recommended the following interventions: dietary management education, guidance, and counseling . The plan is as follows: I have counseled this patient on diet and exercise regimens. .   Anti-inflammatory diet. Visit Vitals    /70 (BP 1 Location: Left arm, BP Patient Position: Sitting)    Pulse 98    Temp 97.6 °F (36.4 °C) (Oral)    Resp 18    Ht 5' 11\" (1.803 m)    Wt 293 lb (132.9 kg)    LMP 03/07/2017 (Approximate)    SpO2 97%    BMI 40.87 kg/m2       Historical Data    Past Medical History:   Diagnosis Date   [facility]     Dr. Lois Mendenhall    Benign essential hypertension 3/24/15    resolved with weight loss and dietary changes    BMI 36.0-36.9,adult     Endometriosis     History of prediabetes     resolved with diet, exercise and wt loss    Hypertriglyceridemia 3/28/15    Hypothyroid 2012    treated with Levothyroxine x3 months then stopped. TFT have been normal since    In vitro fertilization     x2    Miscarriage     x2    Ovarian cyst     QT prolongation     d/t Trazodone; resolved after Trazodone discontinued    Rectal carcinoid tumor 08/2014    stage IIIB; colonic resection, lymph nodes removed, ileostomy 10/2014 (Dr. Elham Wagner); ileostomy take-down 2/3/15. No chemo/XRT.  Sees primarily [facility]. Akin Tuttle [facility]. Also sees Dr. Tyesha Moses here if needed    Septic shock(913.41) 7427    d/t complications from [facility] , laparoscopy - bowel was lacerated, exploratory surgery    Uterine polyp     Vitamin D deficiency 3/28/15       Past Surgical History:   Procedure Laterality Date    HX COLECTOMY  10/2014    rectosigmoid colon resection, ileostomy (Dr. Elham Wagner)    HX GI  2/3/15    ileostomy take-down (Dr. Elham Wagner)    HX GYN      laproscopy and D&C - bowel knicked thus had full exploratory    HX GYN      IVF x 2    HX GYN      A2 (miscarriage)    HX OTHER SURGICAL  8/10/15    incisional hernia repair with mesh, lysis of adhesions (Dr. Darlene Ruth)    HX POLYPECTOMY  2014 (x2)    2 rectal polyps (Dr. Benjamin Barragan, Dr. Rupal Cordova)       Outpatient Encounter Prescriptions as of 3/14/2017   Medication Sig Dispense Refill    FLUARIX QUAD [id number], PF, syrg injection 60 mcg by IntraMUSCular route once. 0    montelukast (SINGULAIR) 10 mg tablet Take 10 mg by mouth daily. 0    PROAIR RESPICLICK 90 mcg/actuation aepb Take 90 mcg by inhalation daily. 3    ORSYTHIA 0.1-20 mg-mcg tab       magnesium oxide (MAG-OX) 400 mg tablet Take 400 mg by mouth daily.  L.acidoph-B.long-L.plant-B.lac (PROBIOTIC ACIDOPHILUS BEADS) 2 billion cell cap Take  by mouth daily.  iron, carbonyl (PERFECT IRON) 25 mg iron tab Take 1 Tab by mouth daily.  ALPRAZolam (XANAX) 1 mg tablet Take 0.5-1 Tabs by mouth daily as needed for Anxiety. 30 Tab 0    cyanocobalamin 1,000 mcg tablet Take 1,000 mcg by mouth daily.  psyllium (METAMUCIL) powd Take 1 Dose by mouth daily.  Cholecalciferol, Vitamin D3, (VITAMIN D3) 2,000 unit cap capsule Take  by mouth daily. No facility-administered encounter medications on file as of 3/14/2017.          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 No children. Works at [employer]. Review of Systems   Constitutional: Positive for malaise/fatigue. Negative for chills, diaphoresis, fever and weight loss. Eyes: Negative for blurred vision. Respiratory: Negative for shortness of breath. Cardiovascular: Negative for chest pain. Gastrointestinal: Negative for abdominal pain. Musculoskeletal: Positive for back pain. Negative for myalgias. Skin: Negative for itching and rash. Neurological: Negative for dizziness, weakness and headaches. Endo/Heme/Allergies: Does not bruise/bleed easily. Psychiatric/Behavioral: Negative for depression. Physical Exam   Constitutional: She is oriented to person, place, and time. She appears well-nourished. No distress. Eyes: Conjunctivae are normal.   Neck: Neck supple. No thyromegaly present. Pulmonary/Chest: Effort normal and breath sounds normal.   Neurological: She is alert and oriented to person, place, and time. Skin: Skin is warm and dry. Psychiatric: She has a normal mood and affect. Her behavior is normal.        I spent 45 minutes with the patient and > 50% of the time was spent in counseling and coordination of care regarding reviewing history, discussing sleep habits, medication review and new meds. Stop iron, xanax, but continue with magnesium at night. Orders Placed This Encounter    FLUARIX QUAD [id number], PF, syrg injection     Si mcg by IntraMUSCular route once. Refill:  0    montelukast (SINGULAIR) 10 mg tablet     Sig: Take 10 mg by mouth daily. Refill:  0    PROAIR RESPICLICK 90 mcg/actuation aepb     Sig: Take 90 mcg by inhalation daily. Refill:  3        I have reviewed the patient's medical history in detail and updated the computerized patient record. We had a prolonged discussion about these complex clinical issues and went over the various important aspects to consider. All questions were answered.      Advised her to call back or return to office if symptoms do not improve, change in nature, or persist.    She was given an after visit summary or informed of "[employer] [facility]" Access which includes patient instructions, diagnoses, current medications, & vitals. She expressed understanding with the diagnosis and plan.

## 2017-03-14 NOTE — PATIENT INSTRUCTIONS
Ropinirole (By mouth)   Ropinirole ([id number])  Treats Parkinson disease and restless legs syndrome (RLS). Brand Name(s):Requip, Requip XL   There may be other brand names for this medicine. When This Medicine Should Not Be Used: This medicine is not right for everyone. Do not use it if you had an allergic reaction to ropinirole. How to Use This Medicine:   Tablet, Long Acting Tablet  · Take your medicine as directed. Your dose may need to be changed several times to find what works best for you. · Swallow the extended-release tablet whole. Do not crush, break, or chew it. · Read and follow the patient instructions that come with this medicine. Talk to your doctor or pharmacist if you have any questions. · Missed dose:   ¨ Parkinson disease: Take a dose as soon as you remember. If it is almost time for your next dose, wait until then and take a regular dose. Do not take extra medicine to make up for a missed dose. ¨ RLS: Skip the missed dose, and take your next dose at the regular time. Do not use extra medicine to make up for a missed dose. · Store the medicine in a closed container at room temperature, away from heat, moisture, and direct light. Drugs and Foods to Avoid:   Ask your doctor or pharmacist before using any other medicine, including over-the-counter medicines, vitamins, and herbal products. · Some foods and medicines can affect how ropinirole works. Tell your doctor knows if you are using any of the following:  ¨ Ciprofloxacin, levodopa, metoclopramide, thiothixene  ¨ Birth control pills, or estrogen to treat menopausal symptoms  ¨ Phenothiazine medicine (such as chlorpromazine, perphenazine, prochlorperazine, promethazine, thioridazine)  · Tell your doctor if you use anything else that makes you sleepy. Some examples are allergy medicine, narcotic pain medicine, and alcohol.   Warnings While Using This Medicine:   · Tell your doctor if you are pregnant or breastfeeding, or if you have kidney disease, liver disease, a sleep disorder, high or low blood pressure, heart disease, heart rhythm problems, lung disease, dyskinesia, or a history of skin cancer or mental health problems. Tell your doctor if you smoke or drink alcohol. · This medicine may cause the following problems:  ¨ Unusual changes in mood or behavior, compulsive behaviors, hallucinations  ¨ Increased risk for skin cancer  · This medicine may make you dizzy or drowsy. It may even cause you to fall asleep without warning. Tell your doctor right away if you learn that this has happened. Do not drive or do anything that could be dangerous until you know how this medicine affects you. Stand up slowly if you are dizzy. · Do not stop using this medicine suddenly. Your doctor will need to slowly decrease your dose before you stop it completely. · Your doctor will check your progress and the effects of this medicine at regular visits. Keep all appointments. · Call your doctor if your symptoms do not improve or if they get worse. For RLS, the symptoms might get worse in the early morning, start earlier in the afternoon, or spread to your arms. · Keep all medicine out of the reach of children. Never share your medicine with anyone.   Possible Side Effects While Using This Medicine:   Call your doctor right away if you notice any of these side effects:  · Allergic reaction: Itching or hives, swelling in your face or hands, swelling or tingling in your mouth or throat, chest tightness, trouble breathing  · Chest pain, trouble breathing, fast or slow heartbeat  · Confusion, unusual changes in mood or behavior, behaviors you cannot control  · Extreme sleepiness or drowsiness  · Fever, sweating, muscle stiffness  · Lightheadedness, dizziness, fainting  · Seeing, hearing, or feeling things that are not really there  · Skin changes or growths  · Twitching or muscle movements you cannot control (either new or worse than usual)  If you notice these less serious side effects, talk with your doctor:   · Headache  · Nausea, vomiting, constipation, stomach upset  · Tiredness  If you notice other side effects that you think are caused by this medicine, tell your doctor. Call your doctor for medical advice about side effects. You may report side effects to FDA at 4-382-ZIZ-7322  © 2016 [address] is for End User's use only and may not be sold, redistributed or otherwise used for commercial purposes. The above information is an  only. It is not intended as medical advice for individual conditions or treatments. Talk to your doctor, nurse or pharmacist before following any medical regimen to see if it is safe and effective for you. HEALTHY SWEETS  How much: Sparingly  Healthy choices: Unsweetened dried fruit, dark chocolate, fruit sorbet  Why: Dark chocolate provides polyphenols with antioxidant activity. Choose dark chocolate with at least 70 percent pure cocoa and have an ounce a few times a week. Fruit sorbet is a better option than other frozen desserts. RED WINE  How much: Optional, no more than 1-2 glasses per day  Healthy choices: Organic red wine   Why: Red wine has beneficial antioxidant activity. Limit intake to no more than 1-2 servings per day. If you do not drink alcohol, do not start. SUPPLEMENTS  How much: Daily   Healthy choices: High quality multivitamin/multimineral that includes key antioxidants (vitamin C, vitamin E, mixed carotenoids, and selenium); co-enzyme Q10; 2-3 grams of a molecularly distilled fish oil; 2,000 IU of vitamin D3   Why: Supplements help fill any gaps in your diet when you are unable to get your daily requirement of micronutrients. Click here to learn more about supplements and get your free recommendation. TEA  How much: 2-4 cups per day  Healthy choices: White, green, oolong teas  Why: Tea is rich in catechins, antioxidant compounds that reduce inflammation. Purchase high-quality tea and learn how to correctly brew it for maximum taste and health benefits. HEALTHY HERBS & SPICES  How much: Unlimited amounts  Healthy choices: Turmeric, wiseman powder (which contains turmeric), randolph and garlic (dried and fresh), chili peppers, basil, cinnamon, rosemary, thyme  Why: Use these herbs and spices generously to season foods. Turmeric and randolph are powerful, natural anti-inflammatory agents. OTHER SOURCES OF PROTEIN  How much: 1-2 servings a week (one portion is equal to 1 ounce of cheese, 1 eight-ounce serving of dairy, 1 egg, 3 ounces cooked poultry or skinless meat)  Healthy choices: High quality natural cheese and yogurt, omega-3 enriched eggs, skinless poultry, grass-fed lean meats  Why: In general, try to reduce consumption of animal foods. If you eat chicken, choose organic, cage-free chicken and remove the skin and associated fat. Use organic, reduced-fat dairy products moderately, especially yogurt and natural cheeses such as Emmental (Swiss), [place] and true ROSS. If you eat eggs, choose omega-3 enriched eggs (made by feeding hens a flax-meal-enriched diet), or organic eggs from free-range chickens. COOKED  MUSHROOMS  How much: Unlimited amounts  Healthy choices: Shiitake, enokidake, maitake, oyster mushrooms (and wild mushrooms if available)   Why: These mushrooms contain compounds that enhance immune function. Never eat mushrooms raw, and minimize consumption of common commercial button mushrooms (including crimini and portobello). WHOLE SOY FOODS  How much: 1-2 servings per day (one serving is equal to ½ cup tofu or tempeh, 1 cup soymilk, ½ cup cooked edamame, 1 ounce of soynuts)  Healthy choices: Tofu, tempeh, edamame, soy nuts, soymilk  Why: Soy foods contain isoflavones that have antioxidant activity and are protective against cancer.   Choose whole soy foods over fractionated foods like isolated soy protein powders and imitation meats made with soy isolate. FISH & SEAFOOD  How much:  2-6 servings per week (one serving is equal to 4 ounces of fish or seafood)  Healthy choices: Wild [place] salmon (especially sockeye), herring, sardines, and black cod (sablefish)  Why: These fish are rich in omega-3 fats, which are strongly anti-inflammatory. If you choose not to eat fish, take a molecularly distilled fish oil supplement that provides both EPA and DHA in a dose of 2-3 grams per day. HEALTHY FATS  How much:  5-7 servings per day (one serving is equal to 1 teaspoon of oil, 2 walnuts, 1 tablespoon of flaxseed, 1 ounce of avocado)   Healthy choices: For cooking, use extra virgin olive oil and expeller-pressed organic canola oil. Other sources of healthy fats include nuts (especially walnuts), avocados, and seeds - including hemp seeds and freshly ground flaxseed. Omega-3 fats are also found in cold water fish, omega-3 enriched eggs, and whole soy foods. Organic, expeller pressed, high-oleic sunflower or safflower oils may also be used, as well as walnut and hazelnut oils in salads and dark roasted sesame oil as a flavoring for soups and stir-fries  Why: Healthy fats are those rich in either monounsaturated or omega-3 fats. Extra-virgin olive oil is rich in polyphenols with antioxidant activity and canola oil contains a small fraction of omega-3 fatty acids. WHOLE & CRACKED GRAINS  How much:  3-5 servings a day (one serving is equal to about ½ cup cooked grains)  Healthy choices: [place], basmati rice, wild rice, buckwheat, groats, barley, quinoa, steel-cut oats   Why: Whole grains digest slowly, reducing frequency of spikes in blood sugar that promote inflammation. \"Whole grains\" means grains that are intact or in a few large pieces, not whole wheat bread or other products made from flour.   PASTA (al dente)  How much: 2-3 servings per week (one serving is equal to about ½ cup cooked pasta)  Healthy choices: Organic pasta, rice noodles, bean thread noodles, and part whole wheat and buckwheat noodles like Japanese udon and soba  Why: Pasta cooked al dente (when it has \"tooth\" to it) has a lower glycemic index than fully-cooked pasta. Low-glycemic-load carbohydrates should be the bulk of your carbohydrate intake to help minimize spikes in blood glucose levels. BEANS & LEGUMES  How much: 1-2 servings per day (one serving is equal to ½ cup cooked beans or legumes)  Healthy choices: Beans like [place], adzuki and black, as well as chickpeas, black-eyed peas and lentils  Why: Beans are rich in folic acid, magnesium, potassium and soluble fiber. They are a low-glycemic-load food. Eat them well-cooked either whole or pureed into spreads like hummus. VEGETABLES  How much: 4-5 servings per day minimum (one serving is equal to 2 cups salad greens, ½ cup vegetables cooked, raw or juiced)  Healthy Choices: Lightly cooked dark leafy greens (spinach, percy greens, kale, Swiss chard), cruciferous vegetables (broccoli, cabbage, [place] sprouts, kale, bok rosalinda and cauliflower), carrots, beets, onions, peas, squashes, sea vegetables and washed raw salad greens  Why: Vegetables are rich in flavonoids and carotenoids with both antioxidant and anti-inflammatory activity. Go for a wide range of colors, eat them both raw and cooked, and choose organic when possible. FRUITS  How much:  3-4 servings per day (one serving is equal to 1 medium size piece of fruit, ½ cup chopped fruit, ¼ cup of dried fruit)  Healthy choices: Raspberries, blueberries, strawberries, peaches, nectarines, oranges, pink grapefruit, red grapes, plums, pomegranates, blackberries, cherries, apples, and pears - all lower in glycemic load than most tropical fruits  Why: Fruits are rich in flavonoids and carotenoids with both antioxidant and anti-inflammatory activity. Go for a wide range of colors, choose fruit that is fresh in season or frozen, and buy organic when possible.   Additional Item:  WATER  How much: Throughout the day  Healthy choices: Drink pure water, or drinks that are mostly water (tea, very diluted fruit juice, sparkling water with lemon) throughout the day. Why: Water is vital for overall functioning of the body.

## 2017-03-14 NOTE — PROGRESS NOTES
Chief Complaint   Patient presents with   [address] [place] [address]     1. Have you been to the ER, urgent care clinic since your last visit? No  Hospitalized since your last visit? No    2. Have you seen or consulted any other health care providers outside of the [address] [place] [address] since your last visit? Dr Aida Luciano [facility] pulmonology; Dr Boyd Sommers, allergy specailist  Include any pap smears or colon screening.  No

## 2017-03-14 NOTE — MR AVS SNAPSHOT
Visit Information Date & Time Provider Department Dept. Phone Encounter #  
 3/14/2017 12:15 PM Theodore Mcdermott MD [place] [id number] [facility] [id number] Upcoming Health Maintenance Date Due  
 PAP AKA CERVICAL CYTOLOGY 7/12/2018 DTaP/Tdap/Td series (2 - Td) 3/24/2025 Allergies as of 3/14/2017  Review Complete On: 3/14/2017 By: Theodore Mcdermott MD  
 No Known Allergies Current Immunizations  Reviewed on 8/10/2015 Name Date Tdap 3/24/2015 Not reviewed this visit Vitals BP Pulse Temp Resp Height(growth percentile) Weight(growth percentile) 126/70 (BP 1 Location: Left arm, BP Patient Position: Sitting) 98 97.6 °F (36.4 °C) (Oral) 18 5' 11\" (1.803 m) 293 lb (132.9 kg) LMP SpO2 BMI OB Status Smoking Status 03/07/2017 (Approximate) 97% 40.87 kg/m2 Having regular periods Former Smoker BMI and BSA Data Body Mass Index Body Surface Area  
 40.87 kg/m 2 2.58 m 2 Preferred Pharmacy Pharmacy Name Phone [id number] [place], [address] [address] [phone] Your Updated Medication List  
  
   
This list is accurate as of: 3/14/17  1:59 PM.  Always use your most recent med list.  
  
  
  
  
 ALPRAZolam 1 mg tablet Commonly known as:  Sharyon Kida Take 0.5-1 Tabs by mouth daily as needed for Anxiety. cyanocobalamin 1,000 mcg tablet Take 1,000 mcg by mouth daily. FLUARIX QUAD [id number] (PF) Syrg injection Generic drug:  influenza vaccine 2016-17 (36mos+)(PF)  
60 mcg by IntraMUSCular route once.  
  
 magnesium oxide 400 mg tablet Commonly known as:  MAG-OX Take 400 mg by mouth daily. montelukast 10 mg tablet Commonly known as:  SINGULAIR Take 10 mg by mouth daily. ORSYTHIA 0.1-20 mg-mcg Tab Generic drug:  levonorgestrel-ethinyl estradiol PERFECT IRON 25 mg iron Tab Generic drug:  iron, carbonyl Take 1 Tab by mouth daily. PROAIR RESPICLICK 90 mcg/actuation Aepb Generic drug:  albuterol sulfate Take 90 mcg by inhalation daily. PROBIOTIC ACIDOPHILUS BEADS 2 billion cell Cap Generic drug:  L.acidoph-B.long-L.plant-B.lac Take  by mouth daily. psyllium Powd Commonly known as:  METAMUCIL Take 1 Dose by mouth daily. VITAMIN D3 2,000 unit Cap capsule Generic drug:  Cholecalciferol (Vitamin D3) Take  by mouth daily. Patient Instructions Ropinirole (By mouth) Ropinirole (ofo-WGE-c-role) Treats Parkinson disease and restless legs syndrome (RLS). Brand Name(s):Requip, Requip XL There may be other brand names for this medicine. When This Medicine Should Not Be Used: This medicine is not right for everyone. Do not use it if you had an allergic reaction to ropinirole. How to Use This Medicine:  
Tablet, Long Acting Tablet · Take your medicine as directed. Your dose may need to be changed several times to find what works best for you. · Swallow the extended-release tablet whole. Do not crush, break, or chew it. · Read and follow the patient instructions that come with this medicine. Talk to your doctor or pharmacist if you have any questions. · Missed dose: ¨ Parkinson disease: Take a dose as soon as you remember. If it is almost time for your next dose, wait until then and take a regular dose. Do not take extra medicine to make up for a missed dose. ¨ RLS: Skip the missed dose, and take your next dose at the regular time. Do not use extra medicine to make up for a missed dose. · Store the medicine in a closed container at room temperature, away from heat, moisture, and direct light. Drugs and Foods to Avoid: Ask your doctor or pharmacist before using any other medicine, including over-the-counter medicines, vitamins, and herbal products. · Some foods and medicines can affect how ropinirole works. Tell your doctor knows if you are using any of the following: ¨ Ciprofloxacin, levodopa, metoclopramide, thiothixene ¨ Birth control pills, or estrogen to treat menopausal symptoms ¨ Phenothiazine medicine (such as chlorpromazine, perphenazine, prochlorperazine, promethazine, thioridazine) · Tell your doctor if you use anything else that makes you sleepy. Some examples are allergy medicine, narcotic pain medicine, and alcohol. Warnings While Using This Medicine: · Tell your doctor if you are pregnant or breastfeeding, or if you have kidney disease, liver disease, a sleep disorder, high or low blood pressure, heart disease, heart rhythm problems, lung disease, dyskinesia, or a history of skin cancer or mental health problems. Tell your doctor if you smoke or drink alcohol. · This medicine may cause the following problems: ¨ Unusual changes in mood or behavior, compulsive behaviors, hallucinations ¨ Increased risk for skin cancer · This medicine may make you dizzy or drowsy. It may even cause you to fall asleep without warning. Tell your doctor right away if you learn that this has happened. Do not drive or do anything that could be dangerous until you know how this medicine affects you. Stand up slowly if you are dizzy. · Do not stop using this medicine suddenly. Your doctor will need to slowly decrease your dose before you stop it completely. · Your doctor will check your progress and the effects of this medicine at regular visits. Keep all appointments. · Call your doctor if your symptoms do not improve or if they get worse. For RLS, the symptoms might get worse in the early morning, start earlier in the afternoon, or spread to your arms. · Keep all medicine out of the reach of children. Never share your medicine with anyone. Possible Side Effects While Using This Medicine:  
Call your doctor right away if you notice any of these side effects: · Allergic reaction: Itching or hives, swelling in your face or hands, swelling or tingling in your mouth or throat, chest tightness, trouble breathing · Chest pain, trouble breathing, fast or slow heartbeat · Confusion, unusual changes in mood or behavior, behaviors you cannot control · Extreme sleepiness or drowsiness · Fever, sweating, muscle stiffness · Lightheadedness, dizziness, fainting · Seeing, hearing, or feeling things that are not really there · Skin changes or growths · Twitching or muscle movements you cannot control (either new or worse than usual) If you notice these less serious side effects, talk with your doctor:  
· Headache · Nausea, vomiting, constipation, stomach upset · Tiredness If you notice other side effects that you think are caused by this medicine, tell your doctor. Call your doctor for medical advice about side effects. You may report side effects to FDA at 1-583-FDA-6671 © 2016 [address] is for End User's use only and may not be sold, redistributed or otherwise used for commercial purposes. The above information is an  only. It is not intended as medical advice for individual conditions or treatments. Talk to your doctor, nurse or pharmacist before following any medical regimen to see if it is safe and effective for you. HEALTHY SWEETS How much: Sparingly Healthy choices: Unsweetened dried fruit, dark chocolate, fruit sorbet Why: Dark chocolate provides polyphenols with antioxidant activity. Choose dark chocolate with at least 70 percent pure cocoa and have an ounce a few times a week. Fruit sorbet is a better option than other frozen desserts. RED WINE How much: Optional, no more than 1-2 glasses per day Healthy choices: Organic red wine Why: Red wine has beneficial antioxidant activity. Limit intake to no more than 1-2 servings per day. If you do not drink alcohol, do not start. SUPPLEMENTS How much: Daily Healthy choices: High quality multivitamin/multimineral that includes key antioxidants (vitamin C, vitamin E, mixed carotenoids, and selenium); co-enzyme Q10; 2-3 grams of a molecularly distilled fish oil; 2,000 IU of vitamin D3 Why: Supplements help fill any gaps in your diet when you are unable to get your daily requirement of micronutrients. Click here to learn more about supplements and get your free recommendation. TEA How much: 2-4 cups per day Healthy choices: White, green, oolong teas Why: Tea is rich in catechins, antioxidant compounds that reduce inflammation. Purchase high-quality tea and learn how to correctly brew it for maximum taste and health benefits. [facility] How much: Unlimited amounts Healthy choices: Turmeric, wiseman powder (which contains turmeric), randolph and garlic (dried and fresh), chili peppers, basil, cinnamon, rosemary, thyme Why: Use these herbs and spices generously to season foods. Turmeric and randolph are powerful, natural anti-inflammatory agents. OTHER SOURCES OF PROTEIN How much: 1-2 servings a week (one portion is equal to 1 ounce of cheese, 1 eight-ounce serving of dairy, 1 egg, 3 ounces cooked poultry or skinless meat) Healthy choices: High quality natural cheese and yogurt, omega-3 enriched eggs, skinless poultry, grass-fed lean meats Why: In general, try to reduce consumption of animal foods. If you eat chicken, choose organic, cage-free chicken and remove the skin and associated fat. Use organic, reduced-fat dairy products moderately, especially yogurt and natural cheeses such as Emmental (Swiss), [place] and true ROSS. If you eat eggs, choose omega-3 enriched eggs (made by feeding hens a flax-meal-enriched diet), or organic eggs from free-range chickens. [facility] How much: Unlimited amounts Healthy choices: Shiitake, enokidake, maitake, oyster mushrooms (and wild mushrooms if available) Why: These mushrooms contain compounds that enhance immune function. Never eat mushrooms raw, and minimize consumption of common commercial button mushrooms (including crimini and portobello). WHOLE SOY FOODS How much: 1-2 servings per day (one serving is equal to ½ cup tofu or tempeh, 1 cup soymilk, ½ cup cooked edamame, 1 ounce of soynuts) Healthy choices: Tofu, tempeh, edamame, soy nuts, soymilk Why: Soy foods contain isoflavones that have antioxidant activity and are protective against cancer. Choose whole soy foods over fractionated foods like isolated soy protein powders and imitation meats made with soy isolate. FISH & SEAFOOD How much:  2-6 servings per week (one serving is equal to 4 ounces of fish or seafood) Healthy choices: Wild [place] salmon (especially sockeye), herring, sardines, and black cod (sablefish) Why: These fish are rich in omega-3 fats, which are strongly anti-inflammatory. If you choose not to eat fish, take a molecularly distilled fish oil supplement that provides both EPA and DHA in a dose of 2-3 grams per day. HEALTHY FATS How much:  5-7 servings per day (one serving is equal to 1 teaspoon of oil, 2 walnuts, 1 tablespoon of flaxseed, 1 ounce of avocado) Healthy choices: For cooking, use extra virgin olive oil and expeller-pressed organic canola oil. Other sources of healthy fats include nuts (especially walnuts), avocados, and seeds - including hemp seeds and freshly ground flaxseed. Omega-3 fats are also found in cold water fish, omega-3 enriched eggs, and whole soy foods. Organic, expeller pressed, high-oleic sunflower or safflower oils may also be used, as well as walnut and hazelnut oils in salads and dark roasted sesame oil as a flavoring for soups and stir-fries Why: Healthy fats are those rich in either monounsaturated or omega-3 fats.   Extra-virgin olive oil is rich in polyphenols with antioxidant activity and canola oil contains a small fraction of omega-3 fatty acids. WHOLE & CRACKED GRAINS How much:  3-5 servings a day (one serving is equal to about ½ cup cooked grains) Healthy choices: Brown rice, basmati rice, wild rice, buckwheat, groats, barley, quinoa, steel-cut oats Why: Whole grains digest slowly, reducing frequency of spikes in blood sugar that promote inflammation. \"Whole grains\" means grains that are intact or in a few large pieces, not whole wheat bread or other products made from flour. PASTA (al dente) How much: 2-3 servings per week (one serving is equal to about ½ cup cooked pasta) Healthy choices: Organic pasta, rice noodles, bean thread noodles, and part whole wheat and buckwheat noodles like [place] udon and soba Why: Pasta cooked al dente (when it has \"tooth\" to it) has a lower glycemic index than fully-cooked pasta. Low-glycemic-load carbohydrates should be the bulk of your carbohydrate intake to help minimize spikes in blood glucose levels. BEANS & LEGUMES How much: 1-2 servings per day (one serving is equal to ½ cup cooked beans or legumes) Healthy choices: Beans like Anasazi, adzuki and black, as well as chickpeas, black-eyed peas and lentils Why: Beans are rich in folic acid, magnesium, potassium and soluble fiber. They are a low-glycemic-load food. Eat them well-cooked either whole or pureed into spreads like hummus. VEGETABLES How much: 4-5 servings per day minimum (one serving is equal to 2 cups salad greens, ½ cup vegetables cooked, raw or juiced) Healthy Choices: Lightly cooked dark leafy greens (spinach, percy greens, kale, Swiss chard), cruciferous vegetables (broccoli, cabbage, [place] sprouts, kale, bok rosalinda and cauliflower), carrots, beets, onions, peas, squashes, sea vegetables and washed raw salad greens Why: Vegetables are rich in flavonoids and carotenoids with both antioxidant and anti-inflammatory activity.   Go for a wide range of colors, eat them both raw and cooked, and choose organic when possible. FRUITS How much:  3-4 servings per day (one serving is equal to 1 medium size piece of fruit, ½ cup chopped fruit, ¼ cup of dried fruit) Healthy choices: Raspberries, blueberries, strawberries, peaches, nectarines, oranges, pink grapefruit, red grapes, plums, pomegranates, blackberries, cherries, apples, and pears - all lower in glycemic load than most tropical fruits Why: Fruits are rich in flavonoids and carotenoids with both antioxidant and anti-inflammatory activity. Go for a wide range of colors, choose fruit that is fresh in season or frozen, and buy organic when possible. Additional Item: 
WATER How much: Throughout the day Healthy choices: Drink pure water, or drinks that are mostly water (tea, very diluted fruit juice, sparkling water with lemon) throughout the day. Why: Water is vital for overall functioning of the body. Introducing [facility]! Dear Isha Castro: Thank you for requesting a [facility] account. Our records indicate that you already have an active [employer] account. You can access your account anytime at [email or web address] Did you know that you can access your hospital and ER discharge instructions at any time in [employer]? You can also review all of your test results from your hospital stay or ER visit. Additional Information If you have questions, please visit the Frequently Asked Questions section of the [employer] website at [email or web address] Remember, [employer] is NOT to be used for urgent needs. For medical emergencies, dial 911. Now available from your iPhone and Android! Please provide this summary of care documentation to your next provider. Your primary care clinician is listed as Shelbie Palmer. If you have any questions after today's visit, please call [phone].

## 2017-03-15 NOTE — PROGRESS NOTES
Establish Care (switch from [place] )       HPI:  Rosita Moctezuma is a 31y.o. year old female who is here to establish care. She  had her medical care:     She reports the following history and medical concerns:      Original note lost went power went out that was written with patient narrating her past medical history. She has two main events that happened. She had sepsis many years ago after developing an infection after D&C surgery for endometriosis. She won a lawsuit for this as per patient. Diagnosed recently with rectal cancer after blood found in stool. She was found to have carcinoid (diarrhea and flushing present). Stage IIIB and had loop ostomy and anastomosis. Being followed by doctor in [place] who specializes in carcinoid. Hypothyroidism in 2012 but then did not require medications. Followed by Dr. Micaela Haq. Saw her last month for fatigue and not found to have endocrine etiology. Hx of QT prolongation from trazodone. Notes state it was resolved after trazodone was stopped. Hx of HTN and prediabetes that resolved when she lost 40 lbs from rigorous exercising. She admits she has not got back to doing that and that is one reason for her weight gain. Sleeps 8 hrs a night but wakes up feeling exhausted. Saw sleep doctor and was told no sleep apnea but this was a home study and they didn't test for RLS. Does admit to legs feeling restless but not painful at night.  says she is constantly moving around in bed. She is extremely fatigued during day and that is her biggest issue now. So tired she can't exercise. Asthma- stable. Assessment and Plan        1. Chronic fatigue  ? possibel RLS. Trial of requip at 0.25 mg and to increase to 0.5 mg.  Her mother has RLS which can explain why she made it. If no relief, will send for sleep study. Problem of course is that she is so tired she can't exercise and then she gets more tired with also her weight gain.       2. Overweight  Discussed with patient the importance of diet and exercise. Discussed reducing foods that promote inflammatory and introducing accountability in portion control and  diet, not to eat late at night, reduce carbohydrate intake at night. Exercise using high intensity interval training rather than 20 minutes of slow cardiac exercise to lose weight. I spent 40 minutes with the patient and > 50% of the time was spent in counseling and coordination of care regarding discussing her extensive history, fatigue, sleep habits, on medications. Visit Vitals    LMP 2017 (Approximate)       Historical Data    Past Medical History:   Diagnosis Date   [place] [facility]     Dr. Tete Hathaway Benign essential hypertension 3/24/15    resolved with weight loss and dietary changes    BMI 36.0-36.9,adult     Endometriosis     History of prediabetes     resolved with diet, exercise and wt loss    Hypertriglyceridemia 3/28/15    Hypothyroid     treated with Levothyroxine x3 months then stopped. TFT have been normal since    In vitro fertilization     x2    Miscarriage     x2    Ovarian cyst     QT prolongation     d/t Trazodone; resolved after Trazodone discontinued    Rectal carcinoid tumor 2014    stage IIIB; colonic resection, lymph nodes removed, ileostomy 10/2014 (Dr. Corrinne Exon); ileostomy take-down 2/3/15. No chemo/XRT.  Sees primarily Dr. Shiv Carias at [facility]. Also sees Dr. Porsha Barry here if needed    Septic [phone]    d/t complications from [facility] , laparoscopy - bowel was lacerated, exploratory surgery    Uterine polyp     Vitamin D deficiency 3/28/15       Past Surgical History:   Procedure Laterality Date    HX COLECTOMY  10/2014    rectosigmoid colon resection, ileostomy (Dr. Corrinne Exon)    HX GI  2/3/15    ileostomy take-down (Dr. Corrinne Exon)    HX GYN      laproscopy and D&C - bowel knicked thus had full exploratory    HX GYN      IVF x 2    HX GYN      A2 (miscarriage)    HX OTHER SURGICAL  8/10/15    incisional hernia repair with mesh, lysis of adhesions (Dr. Ramon Sparks)    HX POLYPECTOMY  7/2014 (x2)    2 rectal polyps (Dr. Claudia Hanna, Dr. Walton Boxer)       Outpatient Encounter Prescriptions as of 3/14/2017   Medication Sig Dispense Refill    rOPINIRole (REQUIP) 0.25 mg tablet Take 2 Tabs by mouth nightly. 30 Tab 0    montelukast (SINGULAIR) 10 mg tablet Take 10 mg by mouth daily. 0    PROAIR RESPICLICK 90 mcg/actuation aepb Take 90 mcg by inhalation daily. 3    ORSYTHIA 0.1-20 mg-mcg tab       magnesium oxide (MAG-OX) 400 mg tablet Take 400 mg by mouth daily.  cyanocobalamin 1,000 mcg tablet Take 1,000 mcg by mouth daily.  psyllium (METAMUCIL) powd Take 1 Dose by mouth daily.  Cholecalciferol, Vitamin D3, (VITAMIN D3) 2,000 unit cap capsule Take  by mouth daily.  L.acidoph-B.long-L.plant-B.lac (PROBIOTIC ACIDOPHILUS BEADS) 2 billion cell cap Take  by mouth daily. No facility-administered encounter medications on file as of 3/14/2017. 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 No children. Works at [employer]. Review of Systems   Constitutional: Positive for malaise/fatigue. Negative for chills and weight loss. Eyes: Positive for blurred vision. Cardiovascular: Negative for chest pain.    Gastrointestinal: Negative for abdominal pain and constipation. Musculoskeletal: Negative for myalgias. Skin: Negative for rash. Neurological: Negative for dizziness, weakness and headaches. Physical Exam   Constitutional: She appears well-developed and well-nourished. She is active. Non-toxic appearance. She does not have a sickly appearance. She does not appear ill. No distress. Eyes: Conjunctivae are normal.   Cardiovascular: Normal rate, regular rhythm, S1 normal, S2 normal, normal heart sounds and normal pulses. Exam reveals no gallop and no friction rub. Pulmonary/Chest: Effort normal and breath sounds normal. No respiratory distress. Abdominal: Soft. Bowel sounds are normal.   Musculoskeletal: She exhibits no edema or deformity. Neurological: She is alert. Skin: Skin is warm and dry. No rash noted. No pallor. Psychiatric: She has a normal mood and affect. Her behavior is normal.   Vitals reviewed. Orders Placed This Encounter    rOPINIRole (REQUIP) 0.25 mg tablet     Sig: Take 2 Tabs by mouth nightly. Dispense:  30 Tab     Refill:  0        I have reviewed the patient's medical history in detail and updated the computerized patient record. We had a prolonged discussion about these complex clinical issues and went over the various important aspects to consider. All questions were answered. Advised her to call back or return to office if symptoms do not improve, change in nature, or persist.    She was given an after visit summary or informed of [facility] which includes patient instructions, diagnoses, current medications, & vitals. She expressed understanding with the diagnosis and plan.

## 2017-03-15 NOTE — PROGRESS NOTES
Chief Complaint   Patient presents with   [facility].     switch from [facility]      1. Have you been to the ER, urgent care clinic since your last visit? No  Hospitalized since your last visit? No    2. Have you seen or consulted any other health care providers outside of the [address] [place] [address] since your last visit? Include any pap smears or colon screening.  No

## 2017-03-27 RX ORDER — ROPINIROLE 0.25 MG/1
0.5 TABLET, FILM COATED ORAL
Qty: 30 TAB | Refills: 0
Start: 2017-03-27 | End: 2017-03-31 | Stop reason: SDUPTHER

## 2017-03-31 DIAGNOSIS — G25.81 RLS (RESTLESS LEGS SYNDROME): Primary | ICD-10-CM

## 2017-03-31 DIAGNOSIS — R53.82 CHRONIC FATIGUE: ICD-10-CM

## 2017-03-31 RX ORDER — ROPINIROLE 1 MG/1
1 TABLET, FILM COATED ORAL
Qty: 30 TAB | Refills: 3 | Status: SHIPPED | OUTPATIENT
Start: 2017-03-31 | End: 2017-07-25

## 2017-05-22 ENCOUNTER — TELEPHONE (OUTPATIENT)
Dept: ONCOLOGY | Age: 37
End: 2017-05-22

## 2017-05-22 NOTE — TELEPHONE ENCOUNTER
Patient is calling to see who Dr. Juan M Ram recommends for her to see given that she has a rare neuroendocrine ca. Please advise to patient.

## 2017-05-24 NOTE — TELEPHONE ENCOUNTER
HIPAA verified. Stated needs scans in August, but wanted to know if should have in July prior to transferring care. Advised would forward to provider and return call or send My Chart message. Verbalized understanding and thanked for call. To provider.

## 2017-05-24 NOTE — TELEPHONE ENCOUNTER
Patient needs questions answered regarding upcoming scans/lab work ordered and appointment with Dr. Laxmi Martin. Patient states she has called several times and has not gotten an answer back.  Please call patient at [phone]

## 2017-05-31 ENCOUNTER — TELEPHONE (OUTPATIENT)
Dept: ONCOLOGY | Age: 37
End: 2017-05-31

## 2017-05-31 NOTE — TELEPHONE ENCOUNTER
Patient has left a couple myChart messages regarding coordinating her upcoming scans. Please contact patient and advise. She would like a call back as soon as possible.

## 2017-06-09 DIAGNOSIS — D3A.026 RECTAL CARCINOID TUMOR: Primary | ICD-10-CM

## 2017-07-10 ENCOUNTER — HOSPITAL ENCOUNTER (OUTPATIENT)
Dept: CT IMAGING | Age: 37
Discharge: HOME | End: 2017-07-10
Payer: COMMERCIAL

## 2017-07-10 DIAGNOSIS — C20 RECTAL CANCER (HCC): ICD-10-CM

## 2017-07-10 PROCEDURE — 74011636320 HC RX REV CODE- 636/320

## 2017-07-10 PROCEDURE — 74011000258 HC RX REV CODE- 258

## 2017-07-10 PROCEDURE — 74177 CT ABD & PELVIS W/CONTRAST: CPT

## 2017-07-10 PROCEDURE — 74178 CT ABD&PLV WO CNTR FLWD CNTR: CPT

## 2017-07-10 PROCEDURE — 74011000255 HC RX REV CODE- 255

## 2017-07-10 PROCEDURE — 71260 CT THORAX DX C+: CPT

## 2017-07-10 RX ORDER — SODIUM CHLORIDE 9 MG/ML
50 INJECTION, SOLUTION INTRAVENOUS
Status: COMPLETED | OUTPATIENT
Start: 2017-07-10 | End: 2017-07-10

## 2017-07-10 RX ORDER — BARIUM SULFATE 20 MG/ML
900 SUSPENSION ORAL
Status: COMPLETED | OUTPATIENT
Start: 2017-07-10 | End: 2017-07-10

## 2017-07-10 RX ADMIN — IOPAMIDOL 100 ML: 755 INJECTION, SOLUTION INTRAVENOUS at 09:49

## 2017-07-10 RX ADMIN — BARIUM SULFATE 900 ML: 21 SUSPENSION ORAL at 09:49

## 2017-07-10 RX ADMIN — SODIUM CHLORIDE 50 ML/HR: 900 INJECTION, SOLUTION INTRAVENOUS at 09:49

## 2017-07-24 ENCOUNTER — TELEPHONE (OUTPATIENT)
Dept: ONCOLOGY | Age: 37
End: 2017-07-24

## 2017-07-24 NOTE — TELEPHONE ENCOUNTER
Call to patient, HIPAA verified. Patient states that she has lab slips and is currently working out a billing issue with [employer] so she can have labs drawn. Patient had called to request a release of medical records form be mailed to her home for her new oncologist. Form placed in mail to patient today. Patient thanked for call and clarification.

## 2017-07-24 NOTE — TELEPHONE ENCOUNTER
Pt needs a new lab order. Pt stated that she can't get it done at [address] [place] [address]. She would like it mailed to her house.

## 2017-07-25 ENCOUNTER — APPOINTMENT (OUTPATIENT)
Dept: ULTRASOUND IMAGING | Age: 37
End: 2017-07-25
Attending: NURSE PRACTITIONER
Payer: COMMERCIAL

## 2017-07-25 ENCOUNTER — HOSPITAL ENCOUNTER (EMERGENCY)
Age: 37
Discharge: HOME | End: 2017-07-25
Attending: EMERGENCY MEDICINE
Payer: COMMERCIAL

## 2017-07-25 VITALS
RESPIRATION RATE: 17 BRPM | HEART RATE: 85 BPM | OXYGEN SATURATION: 98 % | WEIGHT: 293 LBS | BODY MASS INDEX: 41.02 KG/M2 | HEIGHT: 71 IN | SYSTOLIC BLOOD PRESSURE: 128 MMHG | TEMPERATURE: 98.1 F | DIASTOLIC BLOOD PRESSURE: 74 MMHG

## 2017-07-25 DIAGNOSIS — R10.32 ABDOMINAL PAIN, LLQ (LEFT LOWER QUADRANT): Primary | ICD-10-CM

## 2017-07-25 DIAGNOSIS — N83.202 CYST OF LEFT OVARY: ICD-10-CM

## 2017-07-25 LAB
ALBUMIN SERPL BCP-MCNC: 3.6 G/DL (ref 3.5–5)
ALBUMIN/GLOB SERPL: 0.9 {RATIO} (ref 1.1–2.2)
ALP SERPL-CCNC: 72 U/L (ref 45–117)
ALT SERPL-CCNC: 28 U/L (ref 12–78)
ANION GAP BLD CALC-SCNC: 6 MMOL/L (ref 5–15)
AST SERPL W P-5'-P-CCNC: 20 U/L (ref 15–37)
BASOPHILS # BLD AUTO: 0 K/UL (ref 0–0.1)
BASOPHILS # BLD: 0 % (ref 0–1)
BILIRUB SERPL-MCNC: 1.1 MG/DL (ref 0.2–1)
BUN SERPL-MCNC: 11 MG/DL (ref 6–20)
BUN/CREAT SERPL: 16 (ref 12–20)
CALCIUM SERPL-MCNC: 8.5 MG/DL (ref 8.5–10.1)
CHLORIDE SERPL-SCNC: 104 MMOL/L (ref 97–108)
CO2 SERPL-SCNC: 26 MMOL/L (ref 21–32)
CREAT SERPL-MCNC: 0.7 MG/DL (ref 0.55–1.02)
EOSINOPHIL # BLD: 0.1 K/UL (ref 0–0.4)
EOSINOPHIL NFR BLD: 1 % (ref 0–7)
ERYTHROCYTE [DISTWIDTH] IN BLOOD BY AUTOMATED COUNT: 12.7 % (ref 11.5–14.5)
GLOBULIN SER CALC-MCNC: 3.9 G/DL (ref 2–4)
GLUCOSE SERPL-MCNC: 92 MG/DL (ref 65–100)
HCT VFR BLD AUTO: 40.5 % (ref 35–47)
HGB BLD-MCNC: 13.7 G/DL (ref 11.5–16)
LYMPHOCYTES # BLD AUTO: 19 % (ref 12–49)
LYMPHOCYTES # BLD: 2 K/UL (ref 0.8–3.5)
MCH RBC QN AUTO: 30.2 PG (ref 26–34)
MCHC RBC AUTO-ENTMCNC: 33.8 G/DL (ref 30–36.5)
MCV RBC AUTO: 89.4 FL (ref 80–99)
MONOCYTES # BLD: 0.9 K/UL (ref 0–1)
MONOCYTES NFR BLD AUTO: 9 % (ref 5–13)
NEUTS SEG # BLD: 7.5 K/UL (ref 1.8–8)
NEUTS SEG NFR BLD AUTO: 71 % (ref 32–75)
PLATELET # BLD AUTO: 287 K/UL (ref 150–400)
POTASSIUM SERPL-SCNC: 4.2 MMOL/L (ref 3.5–5.1)
PROT SERPL-MCNC: 7.5 G/DL (ref 6.4–8.2)
RBC # BLD AUTO: 4.53 M/UL (ref 3.8–5.2)
SODIUM SERPL-SCNC: 136 MMOL/L (ref 136–145)
WBC # BLD AUTO: 10.5 K/UL (ref 3.6–11)

## 2017-07-25 PROCEDURE — 36415 COLL VENOUS BLD VENIPUNCTURE: CPT | Performed by: EMERGENCY MEDICINE

## 2017-07-25 PROCEDURE — 80053 COMPREHEN METABOLIC PANEL: CPT | Performed by: EMERGENCY MEDICINE

## 2017-07-25 PROCEDURE — 99284 EMERGENCY DEPT VISIT MOD MDM: CPT

## 2017-07-25 PROCEDURE — 76856 US EXAM PELVIC COMPLETE: CPT

## 2017-07-25 PROCEDURE — 85025 COMPLETE CBC W/AUTO DIFF WBC: CPT | Performed by: EMERGENCY MEDICINE

## 2017-07-25 PROCEDURE — 76830 TRANSVAGINAL US NON-OB: CPT

## 2017-07-25 RX ORDER — OXYCODONE HYDROCHLORIDE 5 MG/1
5 TABLET ORAL
Qty: 12 TAB | Refills: 0 | Status: SHIPPED | OUTPATIENT
Start: 2017-07-25 | End: 2018-01-10

## 2017-07-25 NOTE — ED PROVIDER NOTES
HPI Comments: 30 y.o. female with past medical history significant for ovarian cysts, uterine polyp, endometriosis, septic shock, asthma, miscarriage, in vitro fertilization, and hypothyroidism who presents from home with chief complaint of an ovarian cyst. Patient complains of 6/10 left groin pain for one day that is worse with movement. The pain was sharp and stabbing last night but today is dull and achy. She has a hx of ovarian cysts and her OB has voiced concern about her \"tubes twisting\" so she came here to be evaluated. Patient starts her period next week. No vaginal discharge. No known drug allergies. She has not taken any medication for the pain. There are no other acute medical concerns at this time. Social hx: Former smoker. No alcohol use. No illicit drug use. PCP: Iris Gonzalez MD    Note written by Mary You, as dictated by Rosalind Camilo NP 3:09 PM      The history is provided by the patient. Past Medical History:   Diagnosis Date   Lizzie Salastead Asthma     Dr. Mario Sepulveda Benign essential hypertension 3/24/15    resolved with weight loss and dietary changes    BMI 36.0-36.9,adult     Endometriosis     History of prediabetes     resolved with diet, exercise and wt loss    Hypertriglyceridemia 3/28/15    Hypothyroid 2012    treated with Levothyroxine x3 months then stopped. TFT have been normal since    In vitro fertilization     x2    Miscarriage     x2    Ovarian cyst     QT prolongation     d/t Trazodone; resolved after Trazodone discontinued    Rectal carcinoid tumor 08/2014    stage IIIB; colonic resection, lymph nodes removed, ileostomy 10/2014 (Dr. Jf Edwards); ileostomy take-down 2/3/15. No chemo/XRT.  Sees primarily Dr. Julia Brooks at [facility]. Also sees Dr. Anni Cordero here if needed    Septic shock [facility]) 0074    d/t complications from [facility] , laparoscopy - bowel was lacerated, exploratory surgery    Uterine polyp     Vitamin D deficiency 3/28/15       Past Surgical History:   Procedure Laterality Date    HX COLECTOMY  10/2014    rectosigmoid colon resection, ileostomy (Dr. Salazar Laboy)    HX GI  2/3/15    ileostomy take-down (Dr. Salazar Laboy)    HX GYN      laproscopy and D&C - bowel knicked thus had full exploratory    HX GYN      IVF x 2    HX GYN      A2 (miscarriage)    HX OTHER SURGICAL  8/10/15    incisional hernia repair with mesh, lysis of adhesions (Dr. Salazar Laboy)   Catha Sprout HX POLYPECTOMY  2014 (x2)    2 rectal polyps (Dr. Yojana Jesus, Dr. Bravo Lara)         Family History:   Problem Relation Age of Onset    Diabetes Mother     Hypertension Mother     Cancer Mother      uterine    Diabetes Father     Hypertension Father     Hypertension Brother     Diabetes Maternal Aunt     Heart Disease Maternal Aunt     Heart Attack Maternal Aunt     Diabetes Maternal Uncle     Heart Disease Maternal Uncle     Heart Attack Maternal Uncle     Stroke Paternal Grandmother     Colon Cancer Paternal Grandfather     Hypertension Brother     Other Brother      commited suicide    Cancer Maternal Aunt      colon     Cancer Maternal Aunt      lymphoma    Anesth Problems Neg Hx        Social History     Social History    Marital status:      Spouse name: N/A    Number of children: N/A    Years of education: N/A     Occupational History    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 No children. Works at [employer]. ALLERGIES: Review of patient's allergies indicates no known allergies.     Review of Systems   Constitutional: Negative for appetite change, chills and fever. HENT: Negative for congestion. Eyes: Negative for visual disturbance. Respiratory: Negative for cough, chest tightness, shortness of breath and wheezing. Cardiovascular: Negative for chest pain. Gastrointestinal: Negative for abdominal pain, diarrhea and vomiting. Genitourinary: Positive for pelvic pain (left groin). Negative for dysuria, frequency, vaginal discharge and vaginal pain. Musculoskeletal: Negative for joint swelling. Skin: Negative for rash. Neurological: Negative for speech difficulty and headaches. All other systems reviewed and are negative. Vitals:    07/25/17 1310   BP: 130/89   Pulse: 90   Resp: 18   Temp: 98.1 °F (36.7 °C)   SpO2: 96%   Weight: 140.9 kg (310 lb 9.6 oz)   Height: 5' 11\" (1.803 m)            Physical Exam   Constitutional: She is oriented to person, place, and time. She appears well-developed and well-nourished. No distress. HENT:   Head: Normocephalic and atraumatic. Nose: Nose normal.   Eyes: Conjunctivae are normal. Pupils are equal, round, and reactive to light. No scleral icterus. Neck: Normal range of motion. Neck supple. No JVD present. No tracheal deviation present. No thyromegaly present. Cardiovascular: Normal rate, regular rhythm and normal heart sounds. No murmur heard. Pulmonary/Chest: Effort normal and breath sounds normal. No respiratory distress. She has no wheezes. She has no rales. Abdominal: Soft. Bowel sounds are normal. She exhibits no distension and no mass. There is no tenderness. There is no rebound and no guarding. Genitourinary: Uterus normal. Cervix exhibits no motion tenderness. No tenderness in the vagina. No vaginal discharge found. Musculoskeletal: Normal range of motion. She exhibits no edema. Neurological: She is alert and oriented to person, place, and time. No cranial nerve deficit. Coordination normal.   Skin: Skin is warm and dry. No rash noted.  She is not diaphoretic. No erythema. Psychiatric: She has a normal mood and affect. Her behavior is normal.   Nursing note and vitals reviewed. MDM  Number of Diagnoses or Management Options  Abdominal pain, LLQ (left lower quadrant):   Cyst of left ovary:   Diagnosis management comments: Assessment & Plan:   LLQ pain, pain slightly worse than previous ovarian cyst flares. Transvaginal US  Abdominal US  CBC with Diff  CMP    Seen by & Discussed with MD Shu Maynard NP  07/25/17  3:20 PM      Left ovarian cyst without torsion  DC home with pain medication    Shu Correa NP  07/25/17  4:25 PM    4:25 PM  The patient has been reevaluated. The patient is ready for discharge. The patient's signs, symptoms, diagnosis, and discharge instructions have been discussed and the patient/ family has conveyed their understanding. The patient is to follow up as recommended or return to the ED should their symptoms worsen. Plan has been discussed and the patient is in agreement.     LABORATORY TESTS:  Recent Results (from the past 12 hour(s))  -CBC WITH AUTOMATED DIFF  Collection Time: 07/25/17  1:47 PM       Result                                            Value                         Ref Range                       WBC                                               10.5                          3.6 - 11.0 K/uL                 RBC                                               4.53                          3.80 - 5.20 M/uL                HGB                                               13.7                          11.5 - 16.0 g/dL                HCT                                               40.5                          35.0 - 47.0 %                   MCV                                               89.4                          80.0 - 99.0 FL                  MCH                                               30.2                          26.0 - 34.0 PG                  MCHC 33.8                          30.0 - 36.5 g/dL                RDW                                               12.7                          11.5 - 14.5 %                   PLATELET                                          287                           150 - 400 K/uL                  NEUTROPHILS                                       71                            32 - 75 %                       LYMPHOCYTES                                       19                            12 - 49 %                       MONOCYTES                                         9                             5 - 13 %                        EOSINOPHILS                                       1                             0 - 7 %                         BASOPHILS                                         0                             0 - 1 %                         ABS. NEUTROPHILS                                  7.5                           1.8 - 8.0 K/UL                  ABS. LYMPHOCYTES                                  2.0                           0.8 - 3.5 K/UL                  ABS. MONOCYTES                                    0.9                           0.0 - 1.0 K/UL                  ABS. EOSINOPHILS                                  0.1                           0.0 - 0.4 K/UL                  ABS.  BASOPHILS                                    0.0                           0.0 - 0.1 K/UL             -METABOLIC PANEL, COMPREHENSIVE  Collection Time: 07/25/17  1:47 PM       Result                                            Value                         Ref Range                       Sodium                                            136                           136 - 145 mmol/L                Potassium                                         4.2                           3.5 - 5.1 mmol/L                Chloride                                          104                           97 - 108 mmol/L                 CO2 26                            21 - 32 mmol/L                  Anion gap                                         6                             5 - 15 mmol/L                   Glucose                                           92                            65 - 100 mg/dL                  BUN                                               11                            6 - 20 MG/DL                    Creatinine                                        0.70                          0.55 - 1.02 MG/DL               BUN/Creatinine ratio                              16                            12 - 20                         GFR est AA                                        >60                           >60 ml/min/1.73m2               GFR est non-AA                                    >60                           >60 ml/min/1.73m2               Calcium                                           8.5                           8.5 - 10.1 MG/DL                Bilirubin, total                                  1.1 (H)                       0.2 - 1.0 MG/DL                 ALT (SGPT)                                        28                            12 - 78 U/L                     AST (SGOT)                                        20                            15 - 37 U/L                     Alk. phosphatase                                  72                            45 - 117 U/L                    Protein, total                                    7.5                           6.4 - 8.2 g/dL                  Albumin                                           3.6                           3.5 - 5.0 g/dL                  Globulin                                          3.9                           2.0 - 4.0 g/dL                  A-G Ratio                                         0.9 (L)                       1.1 - 2.2                    IMAGING RESULTS:  US PELV NON OBS   Final Result US TRANSVAGINAL   Final Result     Us Transvaginal    Result Date: 7/25/2017  History: Abdominal pain and left lower quadrant pain. Transabdominal followed by endovaginal ultrasound of the pelvis demonstrates that the uterus measures 8.2 x 3.4 x 4.8 cm. The endometrial stripe measures 5.1 mm. The left ovary measures 5.4 x 4.6 x 4.3 cm and contains a 3.7 x 3.5 x 2.8 cm cyst. The right ovary is obscured by bowel gas. Transvaginal scanning demonstrates that the uterus measures 6.6 x 3.7 x 4.2 cm. The endometrial stripe measures 13 mm. The right ovary measures 2.5 x 1.6 x 1.4 cm. There is normal ovarian blood flow. IMPRESSION: Left ovarian cyst.    Us Pelv Non Obs    Result Date: 7/25/2017  History: Abdominal pain and left lower quadrant pain. Transabdominal followed by endovaginal ultrasound of the pelvis demonstrates that the uterus measures 8.2 x 3.4 x 4.8 cm. The endometrial stripe measures 5.1 mm. The left ovary measures 5.4 x 4.6 x 4.3 cm and contains a 3.7 x 3.5 x 2.8 cm cyst. The right ovary is obscured by bowel gas. Transvaginal scanning demonstrates that the uterus measures 6.6 x 3.7 x 4.2 cm. The endometrial stripe measures 13 mm. The right ovary measures 2.5 x 1.6 x 1.4 cm. There is normal ovarian blood flow. IMPRESSION: Left ovarian cyst.        MEDICATIONS GIVEN:  Medications - No data to display    IMPRESSION:  Abdominal pain, LLQ (left lower quadrant)  (primary encounter diagnosis)  Cyst of left ovary    PLAN:  1. Current Discharge Medication List    START taking these medications    oxyCODONE IR (ROXICODONE) 5 mg immediate release tablet  Take 1 Tab by mouth every four (4) hours as needed for Pain. Max Daily Amount: 30 mg. OK to cut tab in half. No driving while taking this medication  Qty: 12 Tab Refills: 0        2.  Follow-up Information     Follow up With Details Comments [address] [facility], [address] [address]  [facility] [address] [facility] [address], [facility] DEP  As needed, If symptoms worsen M[place]   [phone]      3. No driving while on narcotic pain medication    Return to ED for new or worsening symptoms       Kris Tierney NP        ED Course       Pelvic Exam  Date/Time: 7/25/2017 3:18 PM  Performed by: NP  Procedure duration:  3 minutes. Type of exam performed: bimanual.    External genitalia appearance: normal.    Vaginal exam:  normal.    Cervical exam:  not evaluated. Specimen(s) collected:  none.   Bimanual exam:  normal.    Patient tolerance: Patient tolerated the procedure well with no immediate complications

## 2017-07-25 NOTE — ED TRIAGE NOTES
Triage: Pt c/o left groin/LLQ pain starting yesterday. Hx of ovarian cysts. +nausea. Denies V/D, urinary symptoms, vaginal bleeding/discharge, fever. Pt called OBGYN but was unable to be seen.

## 2017-07-25 NOTE — DISCHARGE INSTRUCTIONS
We hope that we have addressed all of your medical concerns. The examination and treatment you received in the Emergency Department were for an emergent problem and were not intended as complete care. It is important that you follow up with your healthcare provider(s) for ongoing care. If your symptoms worsen or do not improve as expected, and you are unable to reach your usual health care provider(s), you should return to the Emergency Department. Today's healthcare is undergoing tremendous change, and patient satisfaction surveys are one of the many tools to assess the quality of medical care. You may receive a survey from the [employer] regarding your experience in the Emergency Department. I hope that your experience has been completely positive, particularly the medical care that I provided. As such, please participate in the survey; anything less than excellent does not meet my expectations or intentions. Formerly [facility] and [address] [place] [address] participate in nationally recognized quality of care measures. If your blood pressure is greater than 120/80, as reported below, we urge that you seek medical care to address the potential of high blood pressure, commonly known as hypertension. Hypertension can be hereditary or can be caused by certain medical conditions, pain, stress, or \"white coat syndrome. \"       Please make an appointment with your health care provider(s) for follow up of your Emergency Department visit. VITALS:   Patient Vitals for the past 8 hrs:   Temp Pulse Resp BP SpO2   07/25/17 1310 98.1 °F (36.7 °C) 90 18 130/89 96 %          Thank you for allowing us to provide you with medical care today. We realize that you have many choices for your emergency care needs. Please choose us in the future for any continued health care needs. Regards,           Ana Hargrove [facility], [address] [place] [address] [address]   Office: [phone]            Recent Results (from the past 24 hour(s))   CBC WITH AUTOMATED DIFF    Collection Time: 07/25/17  1:47 PM   Result Value Ref Range    WBC 10.5 3.6 - 11.0 K/uL    RBC 4.53 3.80 - 5.20 M/uL    HGB 13.7 11.5 - 16.0 g/dL    HCT 40.5 35.0 - 47.0 %    MCV 89.4 80.0 - 99.0 FL    MCH 30.2 26.0 - 34.0 PG    MCHC 33.8 30.0 - 36.5 g/dL    RDW 12.7 11.5 - 14.5 %    PLATELET 824 020 - 059 K/uL    NEUTROPHILS 71 32 - 75 %    LYMPHOCYTES 19 12 - 49 %    MONOCYTES 9 5 - 13 %    EOSINOPHILS 1 0 - 7 %    BASOPHILS 0 0 - 1 %    ABS. NEUTROPHILS 7.5 1.8 - 8.0 K/UL    ABS. LYMPHOCYTES 2.0 0.8 - 3.5 K/UL    ABS. MONOCYTES 0.9 0.0 - 1.0 K/UL    ABS. EOSINOPHILS 0.1 0.0 - 0.4 K/UL    ABS. BASOPHILS 0.0 0.0 - 0.1 K/UL   METABOLIC PANEL, COMPREHENSIVE    Collection Time: 07/25/17  1:47 PM   Result Value Ref Range    Sodium 136 136 - 145 mmol/L    Potassium 4.2 3.5 - 5.1 mmol/L    Chloride 104 97 - 108 mmol/L    CO2 26 21 - 32 mmol/L    Anion gap 6 5 - 15 mmol/L    Glucose 92 65 - 100 mg/dL    BUN 11 6 - 20 MG/DL    Creatinine 0.70 0.55 - 1.02 MG/DL    BUN/Creatinine ratio 16 12 - 20      GFR est AA >60 >60 ml/min/1.73m2    GFR est non-AA >60 >60 ml/min/1.73m2    Calcium 8.5 8.5 - 10.1 MG/DL    Bilirubin, total 1.1 (H) 0.2 - 1.0 MG/DL    ALT (SGPT) 28 12 - 78 U/L    AST (SGOT) 20 15 - 37 U/L    Alk. phosphatase 72 45 - 117 U/L    Protein, total 7.5 6.4 - 8.2 g/dL    Albumin 3.6 3.5 - 5.0 g/dL    Globulin 3.9 2.0 - 4.0 g/dL    A-G Ratio 0.9 (L) 1.1 - 2.2         Us Transvaginal    Result Date: 7/25/2017  History: Abdominal pain and left lower quadrant pain. Transabdominal followed by endovaginal ultrasound of the pelvis demonstrates that the uterus measures 8.2 x 3.4 x 4.8 cm. The endometrial stripe measures 5.1 mm. The left ovary measures 5.4 x 4.6 x 4.3 cm and contains a 3.7 x 3.5 x 2.8 cm cyst. The right ovary is obscured by bowel gas. Transvaginal scanning demonstrates that the uterus measures 6.6 x 3.7 x 4.2 cm. The endometrial stripe measures 13 mm. The right ovary measures 2.5 x 1.6 x 1.4 cm. There is normal ovarian blood flow. IMPRESSION: Left ovarian cyst.    Us Pelv Non Obs    Result Date: 7/25/2017  History: Abdominal pain and left lower quadrant pain. Transabdominal followed by endovaginal ultrasound of the pelvis demonstrates that the uterus measures 8.2 x 3.4 x 4.8 cm. The endometrial stripe measures 5.1 mm. The left ovary measures 5.4 x 4.6 x 4.3 cm and contains a 3.7 x 3.5 x 2.8 cm cyst. The right ovary is obscured by bowel gas. Transvaginal scanning demonstrates that the uterus measures 6.6 x 3.7 x 4.2 cm. The endometrial stripe measures 13 mm. The right ovary measures 2.5 x 1.6 x 1.4 cm. There is normal ovarian blood flow. IMPRESSION: Left ovarian cyst.               Functional Ovarian Cyst: Care Instructions  Your Care Instructions    A functional ovarian cyst is a sac that forms on the surface of a woman's ovary during ovulation. The sac holds a maturing egg. Usually the sac goes away after the egg is released. But if the egg is not released, or if the sac closes up after the egg is released, the sac can swell up with fluid and form a cyst.  Functional ovarian cysts are different than ovarian growths caused by other problems, such as cancer. Most functional ovarian cysts cause no symptoms and go away on their own. Some cause mild pain. Others can cause severe pain when they rupture or bleed. Follow-up care is a key part of your treatment and safety. Be sure to make and go to all appointments, and call your doctor if you are having problems. It's also a good idea to know your test results and keep a list of the medicines you take. How can you care for yourself at home? · Use heat, such as a hot water bottle, a heating pad set on low, or a warm bath, to relax tense muscles and relieve cramping. · Take pain medicines exactly as directed.   ¨ If the doctor gave you a prescription medicine for pain, take it as prescribed. ¨ If you are not taking a prescription pain medicine, ask your doctor if you can take an over-the-counter medicine. · Avoid constipation. Make sure you drink enough fluids and include fruits, vegetables, and fiber in your diet each day. Constipation does not cause ovarian cysts, but it may make your pelvic pain worse. When should you call for help? Call 911 anytime you think you may need emergency care. For example, call if:  · You passed out (lost consciousness). · You have sudden, severe pain in your belly or your pelvis. Call your doctor now or seek immediate medical care if:  · You have new belly or pelvic pain, or your pain gets worse. · You have severe vaginal bleeding. This means that you are soaking through your usual pads or tampons each hour for 2 or more hours. · You are dizzy or lightheaded, or you feel like you may faint. · You have pain or bleeding during or after sex. Watch closely for changes in your health, and be sure to contact your doctor if:  · Your pain keeps you from doing the things that you enjoy. · You do not get better as expected. Where can you learn more? Go to http://niko-yodit.info/. Enter Q514 in the search box to learn more about \"Functional Ovarian Cyst: Care Instructions. \"  Current as of: October 13, 2016  Content Version: 11.3  © 3872-6328 [employer]. Care instructions adapted under license by [employer] (which disclaims liability or warranty for this information). If you have questions about a medical condition or this instruction, always ask your healthcare professional. [place] [address] any warranty or liability for your use of this information.

## 2017-07-26 LAB
ALBUMIN SERPL-MCNC: 4.4 G/DL (ref 3.5–5.5)
ALBUMIN/GLOB SERPL: 1.8 {RATIO} (ref 1.2–2.2)
ALP SERPL-CCNC: 67 IU/L (ref 39–117)
ALT SERPL-CCNC: 22 IU/L (ref 0–32)
AST SERPL-CCNC: 19 IU/L (ref 0–40)
BASOPHILS # BLD AUTO: 0 X10E3/UL (ref 0–0.2)
BASOPHILS NFR BLD AUTO: 0 %
BILIRUB SERPL-MCNC: 1.1 MG/DL (ref 0–1.2)
BUN SERPL-MCNC: 11 MG/DL (ref 6–20)
BUN/CREAT SERPL: 18 (ref 9–23)
CALCIUM SERPL-MCNC: 9.4 MG/DL (ref 8.7–10.2)
CGA SERPL-SCNC: <1 NMOL/L (ref 0–5)
CHLORIDE SERPL-SCNC: 101 MMOL/L (ref 96–106)
CO2 SERPL-SCNC: 25 MMOL/L (ref 18–29)
CREAT SERPL-MCNC: 0.6 MG/DL (ref 0.57–1)
EOSINOPHIL # BLD AUTO: 0.1 X10E3/UL (ref 0–0.4)
EOSINOPHIL NFR BLD AUTO: 2 %
ERYTHROCYTE [DISTWIDTH] IN BLOOD BY AUTOMATED COUNT: 12.9 % (ref 12.3–15.4)
GLOBULIN SER CALC-MCNC: 2.5 G/DL (ref 1.5–4.5)
GLUCOSE SERPL-MCNC: 78 MG/DL (ref 65–99)
HCT VFR BLD AUTO: 40.5 % (ref 34–46.6)
HGB BLD-MCNC: 13.5 G/DL (ref 11.1–15.9)
IMM GRANULOCYTES # BLD: 0.1 X10E3/UL (ref 0–0.1)
IMM GRANULOCYTES NFR BLD: 1 %
LYMPHOCYTES # BLD AUTO: 2.4 X10E3/UL (ref 0.7–3.1)
LYMPHOCYTES NFR BLD AUTO: 27 %
MCH RBC QN AUTO: 30.3 PG (ref 26.6–33)
MCHC RBC AUTO-ENTMCNC: 33.3 G/DL (ref 31.5–35.7)
MCV RBC AUTO: 91 FL (ref 79–97)
MONOCYTES # BLD AUTO: 0.9 X10E3/UL (ref 0.1–0.9)
MONOCYTES NFR BLD AUTO: 10 %
NEUTROPHILS # BLD AUTO: 5.4 X10E3/UL (ref 1.4–7)
NEUTROPHILS NFR BLD AUTO: 60 %
PLATELET # BLD AUTO: 290 X10E3/UL (ref 150–379)
POTASSIUM SERPL-SCNC: 4.4 MMOL/L (ref 3.5–5.2)
PROT SERPL-MCNC: 6.9 G/DL (ref 6–8.5)
RBC # BLD AUTO: 4.45 X10E6/UL (ref 3.77–5.28)
SODIUM SERPL-SCNC: 139 MMOL/L (ref 134–144)
WBC # BLD AUTO: 9 X10E3/UL (ref 3.4–10.8)

## 2017-08-06 LAB
5OH-INDOLEACETATE 24H UR-MCNC: 2.4 MG/L
5OH-INDOLEACETATE 24H UR-MRATE: 7.2 MG/24 HR (ref 0–14.9)

## 2018-01-10 ENCOUNTER — HOSPITAL ENCOUNTER (EMERGENCY)
Age: 38
Discharge: HOME | End: 2018-01-10
Attending: EMERGENCY MEDICINE
Payer: COMMERCIAL

## 2018-01-10 ENCOUNTER — APPOINTMENT (OUTPATIENT)
Dept: GENERAL RADIOLOGY | Age: 38
End: 2018-01-10
Attending: STUDENT IN AN ORGANIZED HEALTH CARE EDUCATION/TRAINING PROGRAM
Payer: COMMERCIAL

## 2018-01-10 VITALS
HEART RATE: 88 BPM | DIASTOLIC BLOOD PRESSURE: 84 MMHG | WEIGHT: 293 LBS | SYSTOLIC BLOOD PRESSURE: 138 MMHG | OXYGEN SATURATION: 96 % | HEIGHT: 71 IN | RESPIRATION RATE: 16 BRPM | TEMPERATURE: 97.4 F | BODY MASS INDEX: 41.02 KG/M2

## 2018-01-10 DIAGNOSIS — J10.1 INFLUENZA A: Primary | ICD-10-CM

## 2018-01-10 DIAGNOSIS — J45.909 REACTIVE AIRWAY DISEASE WITHOUT COMPLICATION, UNSPECIFIED ASTHMA SEVERITY, UNSPECIFIED WHETHER PERSISTENT: ICD-10-CM

## 2018-01-10 LAB
ALBUMIN SERPL-MCNC: 3.9 G/DL (ref 3.5–5)
ALBUMIN/GLOB SERPL: 0.9 {RATIO} (ref 1.1–2.2)
ALP SERPL-CCNC: 81 U/L (ref 45–117)
ALT SERPL-CCNC: 36 U/L (ref 12–78)
ANION GAP SERPL CALC-SCNC: 10 MMOL/L (ref 5–15)
AST SERPL-CCNC: 13 U/L (ref 15–37)
ATRIAL RATE: 77 BPM
BASOPHILS # BLD: 0.1 K/UL (ref 0–0.1)
BASOPHILS NFR BLD: 0 % (ref 0–1)
BILIRUB SERPL-MCNC: 0.5 MG/DL (ref 0.2–1)
BUN SERPL-MCNC: 15 MG/DL (ref 6–20)
BUN/CREAT SERPL: 23 (ref 12–20)
CALCIUM SERPL-MCNC: 9 MG/DL (ref 8.5–10.1)
CALCULATED P AXIS, ECG09: 51 DEGREES
CALCULATED R AXIS, ECG10: 27 DEGREES
CALCULATED T AXIS, ECG11: 44 DEGREES
CHLORIDE SERPL-SCNC: 101 MMOL/L (ref 97–108)
CO2 SERPL-SCNC: 26 MMOL/L (ref 21–32)
CREAT SERPL-MCNC: 0.65 MG/DL (ref 0.55–1.02)
D DIMER PPP FEU-MCNC: 0.34 MG/L FEU (ref 0–0.65)
DIAGNOSIS, 93000: NORMAL
EOSINOPHIL # BLD: 0.1 K/UL (ref 0–0.4)
EOSINOPHIL NFR BLD: 1 % (ref 0–7)
ERYTHROCYTE [DISTWIDTH] IN BLOOD BY AUTOMATED COUNT: 13.2 % (ref 11.5–14.5)
FLUAV AG NPH QL IA: POSITIVE
FLUBV AG NOSE QL IA: NEGATIVE
GLOBULIN SER CALC-MCNC: 4.2 G/DL (ref 2–4)
GLUCOSE SERPL-MCNC: 79 MG/DL (ref 65–100)
HCT VFR BLD AUTO: 44.2 % (ref 35–47)
HGB BLD-MCNC: 14.7 G/DL (ref 11.5–16)
LYMPHOCYTES # BLD: 2.8 K/UL (ref 0.8–3.5)
LYMPHOCYTES NFR BLD: 18 % (ref 12–49)
MCH RBC QN AUTO: 29.8 PG (ref 26–34)
MCHC RBC AUTO-ENTMCNC: 33.3 G/DL (ref 30–36.5)
MCV RBC AUTO: 89.5 FL (ref 80–99)
MONOCYTES # BLD: 1.6 K/UL (ref 0–1)
MONOCYTES NFR BLD: 10 % (ref 5–13)
NEUTS SEG # BLD: 10.8 K/UL (ref 1.8–8)
NEUTS SEG NFR BLD: 71 % (ref 32–75)
P-R INTERVAL, ECG05: 164 MS
PLATELET # BLD AUTO: 350 K/UL (ref 150–400)
POTASSIUM SERPL-SCNC: 3.9 MMOL/L (ref 3.5–5.1)
PROT SERPL-MCNC: 8.1 G/DL (ref 6.4–8.2)
Q-T INTERVAL, ECG07: 368 MS
QRS DURATION, ECG06: 88 MS
QTC CALCULATION (BEZET), ECG08: 416 MS
RBC # BLD AUTO: 4.94 M/UL (ref 3.8–5.2)
S PYO AG THROAT QL: NEGATIVE
SODIUM SERPL-SCNC: 137 MMOL/L (ref 136–145)
TROPONIN I SERPL-MCNC: <0.04 NG/ML
VENTRICULAR RATE, ECG03: 77 BPM
WBC # BLD AUTO: 15.3 K/UL (ref 3.6–11)

## 2018-01-10 PROCEDURE — 36415 COLL VENOUS BLD VENIPUNCTURE: CPT | Performed by: EMERGENCY MEDICINE

## 2018-01-10 PROCEDURE — 87070 CULTURE OTHR SPECIMN AEROBIC: CPT | Performed by: EMERGENCY MEDICINE

## 2018-01-10 PROCEDURE — 74011000250 HC RX REV CODE- 250: Performed by: EMERGENCY MEDICINE

## 2018-01-10 PROCEDURE — 87880 STREP A ASSAY W/OPTIC: CPT

## 2018-01-10 PROCEDURE — 96361 HYDRATE IV INFUSION ADD-ON: CPT

## 2018-01-10 PROCEDURE — 71046 X-RAY EXAM CHEST 2 VIEWS: CPT

## 2018-01-10 PROCEDURE — 93005 ELECTROCARDIOGRAM TRACING: CPT

## 2018-01-10 PROCEDURE — 96375 TX/PRO/DX INJ NEW DRUG ADDON: CPT

## 2018-01-10 PROCEDURE — 85025 COMPLETE CBC W/AUTO DIFF WBC: CPT | Performed by: STUDENT IN AN ORGANIZED HEALTH CARE EDUCATION/TRAINING PROGRAM

## 2018-01-10 PROCEDURE — 87804 INFLUENZA ASSAY W/OPTIC: CPT | Performed by: EMERGENCY MEDICINE

## 2018-01-10 PROCEDURE — 99283 EMERGENCY DEPT VISIT LOW MDM: CPT

## 2018-01-10 PROCEDURE — 74011250636 HC RX REV CODE- 250/636: Performed by: EMERGENCY MEDICINE

## 2018-01-10 PROCEDURE — 77030029684 HC NEB SM VOL KT MONA -A

## 2018-01-10 PROCEDURE — 84484 ASSAY OF TROPONIN QUANT: CPT | Performed by: STUDENT IN AN ORGANIZED HEALTH CARE EDUCATION/TRAINING PROGRAM

## 2018-01-10 PROCEDURE — 96374 THER/PROPH/DIAG INJ IV PUSH: CPT

## 2018-01-10 PROCEDURE — 94640 AIRWAY INHALATION TREATMENT: CPT

## 2018-01-10 PROCEDURE — 85379 FIBRIN DEGRADATION QUANT: CPT | Performed by: EMERGENCY MEDICINE

## 2018-01-10 PROCEDURE — 80053 COMPREHEN METABOLIC PANEL: CPT | Performed by: STUDENT IN AN ORGANIZED HEALTH CARE EDUCATION/TRAINING PROGRAM

## 2018-01-10 RX ORDER — PREDNISONE 10 MG/1
TABLET ORAL SEE ADMIN INSTRUCTIONS
COMMUNITY
Start: 2018-01-07 | End: 2018-01-12

## 2018-01-10 RX ORDER — ONDANSETRON 2 MG/ML
4 INJECTION INTRAMUSCULAR; INTRAVENOUS
Status: COMPLETED | OUTPATIENT
Start: 2018-01-10 | End: 2018-01-10

## 2018-01-10 RX ORDER — ERGOCALCIFEROL 1.25 MG/1
50000 CAPSULE ORAL
COMMUNITY
End: 2019-04-04 | Stop reason: ALTCHOICE

## 2018-01-10 RX ORDER — AMOXICILLIN 500 MG/1
500 CAPSULE ORAL 2 TIMES DAILY
COMMUNITY
Start: 2018-01-07 | End: 2018-02-23 | Stop reason: ALTCHOICE

## 2018-01-10 RX ORDER — ALBUTEROL SULFATE 90 UG/1
1 AEROSOL, METERED RESPIRATORY (INHALATION)
COMMUNITY
End: 2019-04-04 | Stop reason: ALTCHOICE

## 2018-01-10 RX ADMIN — METHYLPREDNISOLONE SODIUM SUCCINATE 125 MG: 125 INJECTION, POWDER, FOR SOLUTION INTRAMUSCULAR; INTRAVENOUS at 12:23

## 2018-01-10 RX ADMIN — ONDANSETRON 4 MG: 2 INJECTION INTRAMUSCULAR; INTRAVENOUS at 12:23

## 2018-01-10 RX ADMIN — ALBUTEROL SULFATE 1 DOSE: 2.5 SOLUTION RESPIRATORY (INHALATION) at 13:34

## 2018-01-10 RX ADMIN — SODIUM CHLORIDE 1000 ML: 900 INJECTION, SOLUTION INTRAVENOUS at 12:23

## 2018-01-10 NOTE — ED TRIAGE NOTES
Pt dx with Bronchitis on 1/4, started on steroids, Amoxicillin, Inhaler. Returned to [facility] today for follow-up, referred to ED. Dx with Acute Bronchitis and the Flu. C/o shortness of breath, pain with inspiration, wheezing, coughing up yellow sputum, body aches at night, fatigue.

## 2018-01-10 NOTE — DISCHARGE INSTRUCTIONS
Please finish your previously prescribed medications as ordered     Influenza (Flu): Care Instructions  Your Care Instructions    Influenza (flu) is an infection in the lungs and breathing passages. It is caused by the influenza virus. There are different strains, or types, of the flu virus from year to year. Unlike the common cold, the flu comes on suddenly and the symptoms, such as a cough, congestion, fever, chills, fatigue, aches, and pains, are more severe. These symptoms may last up to 10 days. Although the flu can make you feel very sick, it usually doesn't cause serious health problems. Home treatment is usually all you need for flu symptoms. But your doctor may prescribe antiviral medicine to prevent other health problems, such as pneumonia, from developing. Older people and those who have a long-term health condition, such as lung disease, are most at risk for having pneumonia or other health problems. Follow-up care is a key part of your treatment and safety. Be sure to make and go to all appointments, and call your doctor if you are having problems. It's also a good idea to know your test results and keep a list of the medicines you take. How can you care for yourself at home? · Get plenty of rest.  · Drink plenty of fluids, enough so that your urine is light yellow or clear like water. If you have kidney, heart, or liver disease and have to limit fluids, talk with your doctor before you increase the amount of fluids you drink. · Take an over-the-counter pain medicine if needed, such as acetaminophen (Tylenol), ibuprofen (Advil, Motrin), or naproxen (Aleve), to relieve fever, headache, and muscle aches. Read and follow all instructions on the label. No one younger than 20 should take aspirin. It has been linked to Reye syndrome, a serious illness. · Do not smoke. Smoking can make the flu worse. If you need help quitting, talk to your doctor about stop-smoking programs and medicines.  These can increase your chances of quitting for good. · Breathe moist air from a hot shower or from a sink filled with hot water to help clear a stuffy nose. · Before you use cough and cold medicines, check the label. These medicines may not be safe for young children or for people with certain health problems. · If the skin around your nose and lips becomes sore, put some petroleum jelly on the area. · To ease coughing:  ¨ Drink fluids to soothe a scratchy throat. ¨ Suck on cough drops or plain hard candy. ¨ Take an over-the-counter cough medicine that contains dextromethorphan to help you get some sleep. Read and follow all instructions on the label. ¨ Raise your head at night with an extra pillow. This may help you rest if coughing keeps you awake. · Take any prescribed medicine exactly as directed. Call your doctor if you think you are having a problem with your medicine. To avoid spreading the flu  · Wash your hands regularly, and keep your hands away from your face. · Stay home from school, work, and other public places until you are feeling better and your fever has been gone for at least 24 hours. The fever needs to have gone away on its own without the help of medicine. · Ask people living with you to talk to their doctors about preventing the flu. They may get antiviral medicine to keep from getting the flu from you. · To prevent the flu in the future, get a flu vaccine every fall. Encourage people living with you to get the vaccine. · Cover your mouth when you cough or sneeze. When should you call for help? Call 911 anytime you think you may need emergency care. For example, call if:  ? · You have severe trouble breathing. ?Call your doctor now or seek immediate medical care if:  ? · You have new or worse trouble breathing. ? · You seem to be getting much sicker. ? · You feel very sleepy or confused. ? · You have a new or higher fever. ? · You get a new rash. ? Watch closely for changes in your health, and be sure to contact your doctor if:  ? · You begin to get better and then get worse. ? · You are not getting better after 1 week. Where can you learn more? Go to http://niko-yodit.info/. Enter O055 in the search box to learn more about \"Influenza (Flu): Care Instructions. \"  Current as of: May 12, 2017  Content Version: 11.4  © 1743-6111 [employer]. Care instructions adapted under license by [employer] (which disclaims liability or warranty for this information). If you have questions about a medical condition or this instruction, always ask your healthcare professional. [address] any warranty or liability for your use of this information. Reactive Airway Disease: Care Instructions  Your Care Instructions    Reactive airway disease is a breathing problem that appears as wheezing, a whistling noise in your airways. It may be caused by a viral or bacterial infection, allergies, tobacco smoke, or something else in the environment. When you are around these triggers, your body releases chemicals that make the airways get tight. Reactive airway disease is a lot like asthma. Both can cause wheezing. But asthma is ongoing, while reactive airway disease may occur only now and then. Tests can be done to tell whether you have asthma. You may take the same medicines used to treat asthma. Good home care and follow-up care with your doctor can help you recover. Follow-up care is a key part of your treatment and safety. Be sure to make and go to all appointments, and call your doctor if you are having problems. It's also a good idea to know your test results and keep a list of the medicines you take. How can you care for yourself at home? · Take your medicines exactly as prescribed. Call your doctor if you think you are having a problem with your medicine. · Do not smoke or allow others to smoke around you.  If you need help quitting, talk to your doctor about stop-smoking programs and medicines. These can increase your chances of quitting for good. · If you know what caused your wheezing (such as perfume or the odor of household chemicals), try to avoid it in the future. · Wash your hands several times a day, and consider using hand gels or wipes that contain alcohol. This can prevent colds and other infections. When should you call for help? Call 911 anytime you think you may need emergency care. For example, call if:  ? · You have severe trouble breathing. ? Watch closely for changes in your health, and be sure to contact your doctor if:  ? · You cough up yellow, dark brown, or bloody mucus. ? · You have a fever. ? · Your wheezing gets worse. Where can you learn more? Go to http://niko-yodit.info/. Enter G833 in the search box to learn more about \"Reactive Airway Disease: Care Instructions. \"  Current as of: May 12, 2017  Content Version: 11.4  © 5352-9907 [employer]. Care instructions adapted under license by [employer] (which disclaims liability or warranty for this information). If you have questions about a medical condition or this instruction, always ask your healthcare professional. [place] [address] any warranty or liability for your use of this information.

## 2018-01-10 NOTE — ED PROVIDER NOTES
Patient is a 30 y.o. female presenting with shortness of breath and cough. Shortness of Breath   Associated symptoms include sore throat, cough and vomiting. Pertinent negatives include no headaches, no neck pain and no abdominal pain. Cough   Associated symptoms include sore throat, shortness of breath, nausea and vomiting. Pertinent negatives include no headaches. Pt reports that she has had chest congestion, sore throat, intermittent SOB and a productive cough for 5-6 days. She has been taking amoxicillin, medrol dose pack and albuterol MDI without relief. She returned to work today and was coughing so much that she vomited. Denies fever, headache, neck pain, visual changes, focal weakness or rash. Denies any difficulty breathing, difficulty swallowing, chest pain, chest pain or abdominal pain. Denies any nausea, vomiting or diarrhea. Pt. Was re-evaluated at [facility] and sent to the ED for further evaluation. Old charts reviewed         Past Medical History:   Diagnosis Date   Carolina Fariasamirah Asthma     Dr. Rodrigo Ayoub Benign essential hypertension 3/24/15    resolved with weight loss and dietary changes    BMI 36.0-36.9,adult     Endometriosis     History of prediabetes     resolved with diet, exercise and wt loss    Hypertriglyceridemia 3/28/15    Hypothyroid 2012    treated with Levothyroxine x3 months then stopped. TFT have been normal since    In vitro fertilization     x2    Miscarriage     x2    Ovarian cyst     QT prolongation     d/t Trazodone; resolved after Trazodone discontinued    Rectal carcinoid tumor 08/2014    stage IIIB; colonic resection, lymph nodes removed, ileostomy 10/2014 (Dr. Nikolay Hidalgo); ileostomy take-down 2/3/15. No chemo/XRT.  Sees primarily Dr. Cele Jo at [facility]. Also sees Dr. Irwin Beat here if needed    Septic [phone] 6564    d/t complications from [facility] , laparoscopy - bowel was lacerated, exploratory surgery    Uterine polyp     Vitamin D deficiency 3/28/15       Past Surgical History:   Procedure Laterality Date    HX COLECTOMY  10/2014    rectosigmoid colon resection, ileostomy (Dr. Nikolay Hidalgo)    HX GI  2/3/15    ileostomy take-down (Dr. Nikolay Hidalgo)    HX GYN      laproscopy and D&C - bowel knicked thus had full exploratory    HX GYN      IVF x 2    HX GYN      A2 (miscarriage)    HX OTHER SURGICAL  8/10/15    incisional hernia repair with mesh, lysis of adhesions (Dr. Nikolay Hidalgo)   [facility] [place] HX POLYPECTOMY  2014 (x2)    2 rectal polyps (Dr. Brianna Wright, Dr. Gracy Avelar)         Family History:   Problem Relation Age of Onset    Diabetes Mother     Hypertension Mother     Cancer Mother      uterine    Diabetes Father     Hypertension Father     Hypertension Brother     Diabetes Maternal Aunt     Heart Disease Maternal Aunt     Heart Attack Maternal Aunt     Diabetes Maternal Uncle     Heart Disease Maternal Uncle     Heart Attack Maternal Uncle     Stroke Paternal Grandmother     Colon Cancer Paternal Grandfather     Hypertension Brother     Other Brother      commited suicide    Cancer Maternal Aunt      colon     Cancer Maternal Aunt      lymphoma    Anesth Problems Neg Hx        Social History     Social History    Marital status:      Spouse name: N/A    Number of children: N/A    Years of education: N/A     Occupational History    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 No children. Works at [employer]. ALLERGIES: Review of patient's allergies indicates no known allergies.     Review of Systems Constitutional: Negative for activity change and appetite change. HENT: Positive for sore throat. Negative for facial swelling and trouble swallowing. Eyes: Negative. Respiratory: Positive for cough and shortness of breath. Cardiovascular: Negative. Gastrointestinal: Positive for nausea and vomiting. Negative for abdominal pain and diarrhea. Genitourinary: Negative for dysuria. Musculoskeletal: Negative for back pain and neck pain. Skin: Negative for color change. Neurological: Negative for headaches. Psychiatric/Behavioral: Negative. Vitals:    01/10/18 1134   BP: (!) 167/106   Pulse: 85   Resp: 18   Temp: 97.9 °F (36.6 °C)   SpO2: 97%   Weight: 142.8 kg (314 lb 12.8 oz)   Height: 5' 11\" (1.803 m)            Physical Exam   Constitutional: She is oriented to person, place, and time. Obese white female; non smoker;    HENT:   Head: Normocephalic. Right Ear: External ear normal.   Left Ear: External ear normal.   Mouth/Throat: Oropharynx is clear and moist.   Eyes: Pupils are equal, round, and reactive to light. Neck: Normal range of motion. Neck supple. Cardiovascular: Normal rate and regular rhythm. Pulmonary/Chest: She has wheezes. Bronchospastic cough   Abdominal: Soft. Bowel sounds are normal. She exhibits no distension and no mass. There is no tenderness. There is no rebound and no guarding. Musculoskeletal: Normal range of motion. Lymphadenopathy:     She has no cervical adenopathy. Neurological: She is alert and oriented to person, place, and time. Skin: Skin is warm and dry. No rash noted. Nursing note and vitals reviewed. [facility]  ED Course       Procedures    EKG reveals NSR with ventricular rate of 77; without ectopy. Reviewed by Dr. Enma Dash and me. Tierra Ball NP      1:38 PM  Pt has been re-examined after nebulizer treatment and states that they are feeling slightly better and have no new complaints.   On auscultation, breathing is significantly improved. Laboratory tests, medications, x-rays, diagnosis, follow up plan and return instructions have been reviewed and discussed with the patient. Pt has had the opportunity to ask questions about her care. Patient expresses understanding and agreement with care plan, including oral steroids, nebulizer or inhaler use, follow up and return instructions. Patient agrees to return in 24 hours if her symptoms are not improving or immediately if she has any change in her condition including worsening wheezing or any signs of increasing work of breathing. Janae Wallace, NP

## 2018-01-10 NOTE — LETTER
NOTIFICATION RETURN TO WORK / SCHOOL 
 
1/10/2018 1:42 PM 
 
[facility] [address] To Whom It May Concern: 
 
Arik Evans is currently under the care of [facility] [place] [facility]. She will return to work/school on: 1/17/18 If there are questions or concerns please have the patient contact our office. Sincerely, Evelin Root NP

## 2018-01-12 LAB
BACTERIA SPEC CULT: NORMAL
SERVICE CMNT-IMP: NORMAL

## 2018-02-23 ENCOUNTER — OFFICE VISIT (OUTPATIENT)
Dept: ONCOLOGY | Age: 38
End: 2018-02-23

## 2018-02-23 VITALS
BODY MASS INDEX: 41.02 KG/M2 | DIASTOLIC BLOOD PRESSURE: 82 MMHG | HEART RATE: 94 BPM | RESPIRATION RATE: 20 BRPM | OXYGEN SATURATION: 96 % | TEMPERATURE: 97.9 F | HEIGHT: 71 IN | SYSTOLIC BLOOD PRESSURE: 135 MMHG | WEIGHT: 293 LBS

## 2018-02-23 DIAGNOSIS — R53.82 CHRONIC FATIGUE: ICD-10-CM

## 2018-02-23 DIAGNOSIS — D3A.026 RECTAL CARCINOID TUMOR: Primary | ICD-10-CM

## 2018-02-23 DIAGNOSIS — E66.01 OBESITY, MORBID, BMI 40.0-49.9 (HCC): ICD-10-CM

## 2018-02-23 RX ORDER — AMOXICILLIN AND CLAVULANATE POTASSIUM 500; 125 MG/1; MG/1
TABLET, FILM COATED ORAL
Refills: 0 | COMMUNITY
Start: 2018-01-05 | End: 2018-02-23 | Stop reason: ALTCHOICE

## 2018-02-23 RX ORDER — METFORMIN HYDROCHLORIDE 1000 MG/1
1000 TABLET ORAL 2 TIMES DAILY WITH MEALS
COMMUNITY
End: 2019-04-04 | Stop reason: ALTCHOICE

## 2018-02-23 RX ORDER — LANOLIN ALCOHOL/MO/W.PET/CERES
1000 CREAM (GRAM) TOPICAL DAILY
COMMUNITY
End: 2019-04-04 | Stop reason: ALTCHOICE

## 2018-02-23 NOTE — PROGRESS NOTES
Hematology/Oncology Progress Note    REASON FOR VISIT: Rectal Carcinoid    HISTORY OF PRESENT ILLNESS: Ms. Chaparro Benoit is a 30 y.o. female who presents for follow-up of rectal carcinoid. Had a colonoscopy under the care of Dr. Brook Acevedo in July 2014 for rectal bleeding. She had a polyp at 9cm from anal verge. Pathology revealed a 2.2cm rectal carcinoid. EUS by Dr. Kimo Ge on 9/23/14 without any appreciable adenoathy and with good preservation of rectal layers. Was then sent to see Dr. Bell Miller who did a rectosigmoid colon resection on October 30, 2014. Tells me she recovered very well from surgery. She had an ileostomy and underwent reversal on 2/3/15. Had been going to the [facility] for the Dotatate-PET study. Plan through the [facility] is annual surveillance - last PET in September 2016. Has since been off study. She saw Dr. Bell Miller this year with a local exam that showed some polyps and seeing him again in a few months. She feels well overall  No fevers, chills, night sweats. No new aches or pains. No new lumps or bumps. No CP/SOB/AMADOR. No nausea, vomiting, diarrhea, or constipation. No bleeding. She is planning to start in vitro fertilization soon and wood rather have her scans done now than v=wait until July    No Known Allergies    Current Outpatient Prescriptions   Medication Sig Dispense Refill    cyanocobalamin (VITAMIN B-12) 1,000 mcg tablet Take 1,000 mcg by mouth daily.  metFORMIN (GLUCOPHAGE) 1,000 mg tablet Take 1,000 mg by mouth two (2) times daily (with meals).  DOCOSAHEXANOIC ACID/EPA (FISH OIL PO) Take  by mouth.  PRENATAL /IRON/FOLIC ACID (PRENATAL MULTI PO) Take  by mouth.  psyllium (METAMUCIL) packet Take 1 Packet by mouth daily.  ergocalciferol (VITAMIN D2) 50,000 unit capsule Take 50,000 Units by mouth every Monday.  albuterol (PROAIR HFA) 90 mcg/actuation inhaler Take 1 Puff by inhalation every four (4) hours as needed for Wheezing.        ROS  As per the HPI, otherwise a comprehensive ROS is negative. ECOG PS is 0. Emotional well being addressed and patient is coping well. Physical Examination:   Visit Vitals    /82    Pulse 94    Temp 97.9 °F (36.6 °C)    Resp 20    Ht 5' 11\" (1.803 m)    Wt 315 lb 3.2 oz (143 kg)    LMP 02/17/2018    SpO2 96%    BMI 43.96 kg/m2     General appearance - alert, well appearing, and in no distress  Mental status - oriented to person, place, and time  Mouth - mucous membranes moist, pharynx normal without lesions  Neck - supple, no significant adenopathy  Lymphatics - no palpable lymphadenopathy, no hepatosplenomegaly  Chest - clear to auscultation, no wheezes, rales or rhonchi, symmetric air entry  Heart - normal rate, regular rhythm, normal S1, S2, no murmurs, rubs, clicks or gallops  Abdomen - ileostomy, otherwise, soft, nontender, nondistended, no masses or organomegaly, bowel sounds present  Extremities - peripheral pulses normal, no pedal edema, no clubbing or cyanosis  Skin - normal coloration and turgor, no rashes, no suspicious skin lesions noted    LABS  Lab Results   Component Value Date/Time    WBC 15.3 (H) 01/10/2018 12:05 PM    HGB 14.7 01/10/2018 12:05 PM    HCT 44.2 01/10/2018 12:05 PM    PLATELET 590 57/85/7334 12:05 PM    MCV 89.5 01/10/2018 12:05 PM    ABS. NEUTROPHILS 10.8 (H) 01/10/2018 12:05 PM     Lab Results   Component Value Date/Time    Sodium 137 01/10/2018 12:05 PM    Potassium 3.9 01/10/2018 12:05 PM    Chloride 101 01/10/2018 12:05 PM    CO2 26 01/10/2018 12:05 PM    Glucose 79 01/10/2018 12:05 PM    BUN 15 01/10/2018 12:05 PM    Creatinine 0.65 01/10/2018 12:05 PM    GFR est AA >60 01/10/2018 12:05 PM    GFR est non-AA >60 01/10/2018 12:05 PM    Calcium 9.0 01/10/2018 12:05 PM    BUN (POC) 14 03/07/2016 02:08 PM    Calcium, ionized (POC) 1.14 03/07/2016 02:08 PM     Lab Results   Component Value Date/Time    AST (SGOT) 13 (L) 01/10/2018 12:05 PM    Alk.  phosphatase 81 01/10/2018 12:05 PM    Protein, total 8.1 01/10/2018 12:05 PM    Albumin 3.9 01/10/2018 12:05 PM    Globulin 4.2 (H) 01/10/2018 12:05 PM    A-G Ratio 0.9 (L) 01/10/2018 12:05 PM     Lab Results   Component Value Date/Time    Iron % saturation 22 03/27/2015 03:03 PM    TIBC 295 03/27/2015 03:03 PM    Ferritin 70 01/20/2017 02:11 PM    Vitamin B12 645 01/20/2017 02:11 PM    Sed rate (ESR) 9 06/15/2016 10:18 AM    TSH 2.020 01/20/2017 02:11 PM    D-dimer 0.34 01/10/2018 12:05 PM    Lipase 123 05/27/2015 02:57 PM     PATHOLOGY  Rectal polyp on 9/23/14 ([id number]) with well-differentiated neuroendocrine neoplasm. Rectosigmoid colon resection 10/30/14 ([id number]) 2mm focus of well differentiated neuroendocrine neoplasm (this was from the original biopsy) with 4 of 39 nodes positive for metastatic well-differentiated neuroendocrine neoplasm. Pathologic stage T1N1. IMAGING  CT AP 7/2017: [facility]  CT chest/abdomen/pelvis on 1/30/17 with no evidence of a metastasis. New 5.0 cm lesion in the right ovary with layering hyperdensity suggestive of a hemorrhagic cyst.   CT chest/abdomen/pelvis on 8/18/15 with no sign of cancer. Dotatate scan on 8/14/15 with no evidence of somatostatin receptor presenting tumor. CT chest/abdomen/pelvis on 3/20/15 with no sign of cancer. CT chest/abdomen/pelvis on 1/27/15 with posterior right hepatic lobe segment 7 wedge-shaped hyperdensity in the subcapsular region present on both arterial and portal venous phases and  questionably on the unenhanced phase may represent atypical focal fatty sparing or perfusion anomaly. No definite liver metastases or focal arterial enhancing lesion is seen. No evidence for metastatic disease in the chest.   Octreotide scan on 11/18/14 with no evidence of metastatic disease. CT Abdomen/Pelvis on 10/10/14 with no definite metastatic identified. No definite abnormality seen in the rectum, however the rectum is under distended.  Multiple nonspecific, mildly enlarged mesenteric lymph nodes. Followup is recommended. Hepatic steatosis. ASSESSMENT  Ms. Koki Santo is a 30 y.o. female who presents for evaluation of Stage IIIB (T2 N1 M0) rectal carcinoid who presents for follow-up. DISCUSSION/PLAN  1. Rectal carcinoid. There is no sign of recurrence as per Dr. Monia Lundborg recent exam and CT done 7/2017. Now off NIH study and would like us to continue annual surveillance. She would rather not wait until  July 2018. She is starting in vitro fertilization. The NCCN guidelines recommend surveillance every 6-12 months. She does not do well with oral contrast and hence I will get an MRI. Plan to add chest every other year     2. Ovarian cyst.  This was present on her CT in January. Has been following with gyn. Repeat US showed the cyst had collapsed.      Plan to see her annually , I will call her with results of the scans.  She will call us closer to her annual appointment for orders on imaging and urine 5 RICARDO Ratliff MD

## 2018-03-04 ENCOUNTER — HOSPITAL ENCOUNTER (OUTPATIENT)
Dept: MRI IMAGING | Age: 38
Discharge: HOME | End: 2018-03-04
Attending: INTERNAL MEDICINE
Payer: COMMERCIAL

## 2018-03-04 DIAGNOSIS — D3A.026 RECTAL CARCINOID TUMOR: ICD-10-CM

## 2018-03-04 PROCEDURE — 74183 MRI ABD W/O CNTR FLWD CNTR: CPT

## 2018-03-04 PROCEDURE — 74011250636 HC RX REV CODE- 250/636: Performed by: INTERNAL MEDICINE

## 2018-03-04 PROCEDURE — A9577 INJ MULTIHANCE: HCPCS | Performed by: INTERNAL MEDICINE

## 2018-03-04 RX ADMIN — GADOBENATE DIMEGLUMINE 20 ML: 529 INJECTION, SOLUTION INTRAVENOUS at 11:09

## 2018-03-06 ENCOUNTER — TELEPHONE (OUTPATIENT)
Dept: ONCOLOGY | Age: 38
End: 2018-03-06

## 2018-03-06 NOTE — TELEPHONE ENCOUNTER
Mehrdad Arreguin (pt) called in requesting MRI results be sent to [facility] and c/b as well.  Pt contact # [phone]

## 2018-03-06 NOTE — TELEPHONE ENCOUNTER
Call received from patient, HIPAA verified. Advised of result note by provider and that results also are available on [facility]. Patient verbalized understanding and thanked for call.

## 2018-05-16 ENCOUNTER — HOSPITAL ENCOUNTER (EMERGENCY)
Age: 38
Discharge: HOME | End: 2018-05-17
Attending: EMERGENCY MEDICINE
Payer: COMMERCIAL

## 2018-05-16 ENCOUNTER — APPOINTMENT (OUTPATIENT)
Dept: ULTRASOUND IMAGING | Age: 38
End: 2018-05-16
Attending: EMERGENCY MEDICINE
Payer: COMMERCIAL

## 2018-05-16 DIAGNOSIS — N12 PYELONEPHRITIS: Primary | ICD-10-CM

## 2018-05-16 LAB
ALBUMIN SERPL-MCNC: 3.6 G/DL (ref 3.5–5)
ALBUMIN/GLOB SERPL: 0.9 {RATIO} (ref 1.1–2.2)
ALP SERPL-CCNC: 75 U/L (ref 45–117)
ALT SERPL-CCNC: 27 U/L (ref 12–78)
ANION GAP SERPL CALC-SCNC: 14 MMOL/L (ref 5–15)
APPEARANCE UR: ABNORMAL
AST SERPL-CCNC: 12 U/L (ref 15–37)
BACTERIA URNS QL MICRO: ABNORMAL /HPF
BASOPHILS # BLD: 0.1 K/UL (ref 0–0.1)
BASOPHILS NFR BLD: 1 % (ref 0–1)
BILIRUB SERPL-MCNC: 1.2 MG/DL (ref 0.2–1)
BILIRUB UR QL: NEGATIVE
BUN SERPL-MCNC: 9 MG/DL (ref 6–20)
BUN/CREAT SERPL: 14 (ref 12–20)
CALCIUM SERPL-MCNC: 8.6 MG/DL (ref 8.5–10.1)
CHLORIDE SERPL-SCNC: 102 MMOL/L (ref 97–108)
CO2 SERPL-SCNC: 22 MMOL/L (ref 21–32)
COLOR UR: ABNORMAL
CREAT SERPL-MCNC: 0.66 MG/DL (ref 0.55–1.02)
DIFFERENTIAL METHOD BLD: ABNORMAL
EOSINOPHIL # BLD: 0.1 K/UL (ref 0–0.4)
EOSINOPHIL NFR BLD: 1 % (ref 0–7)
EPITH CASTS URNS QL MICRO: ABNORMAL /LPF
ERYTHROCYTE [DISTWIDTH] IN BLOOD BY AUTOMATED COUNT: 12.6 % (ref 11.5–14.5)
GLOBULIN SER CALC-MCNC: 3.9 G/DL (ref 2–4)
GLUCOSE SERPL-MCNC: 96 MG/DL (ref 65–100)
GLUCOSE UR STRIP.AUTO-MCNC: NEGATIVE MG/DL
HCG SERPL-ACNC: 76 MIU/ML (ref 0–6)
HCT VFR BLD AUTO: 39.6 % (ref 35–47)
HGB BLD-MCNC: 13.4 G/DL (ref 11.5–16)
HGB UR QL STRIP: ABNORMAL
HYALINE CASTS URNS QL MICRO: ABNORMAL /LPF (ref 0–5)
IMM GRANULOCYTES # BLD: 0.1 K/UL (ref 0–0.04)
IMM GRANULOCYTES NFR BLD AUTO: 0 % (ref 0–0.5)
KETONES UR QL STRIP.AUTO: NEGATIVE MG/DL
LEUKOCYTE ESTERASE UR QL STRIP.AUTO: ABNORMAL
LYMPHOCYTES # BLD: 3.2 K/UL (ref 0.8–3.5)
LYMPHOCYTES NFR BLD: 21 % (ref 12–49)
MCH RBC QN AUTO: 30.7 PG (ref 26–34)
MCHC RBC AUTO-ENTMCNC: 33.8 G/DL (ref 30–36.5)
MCV RBC AUTO: 90.6 FL (ref 80–99)
MONOCYTES # BLD: 1.3 K/UL (ref 0–1)
MONOCYTES NFR BLD: 8 % (ref 5–13)
NEUTS SEG # BLD: 10.9 K/UL (ref 1.8–8)
NEUTS SEG NFR BLD: 70 % (ref 32–75)
NITRITE UR QL STRIP.AUTO: NEGATIVE
NRBC # BLD: 0 K/UL (ref 0–0.01)
NRBC BLD-RTO: 0 PER 100 WBC
PH UR STRIP: 6 [PH] (ref 5–8)
PLATELET # BLD AUTO: 289 K/UL (ref 150–400)
PMV BLD AUTO: 9.6 FL (ref 8.9–12.9)
POTASSIUM SERPL-SCNC: 3.7 MMOL/L (ref 3.5–5.1)
PROT SERPL-MCNC: 7.5 G/DL (ref 6.4–8.2)
PROT UR STRIP-MCNC: 30 MG/DL
RBC # BLD AUTO: 4.37 M/UL (ref 3.8–5.2)
RBC #/AREA URNS HPF: ABNORMAL /HPF (ref 0–5)
SODIUM SERPL-SCNC: 138 MMOL/L (ref 136–145)
SP GR UR REFRACTOMETRY: 1.01 (ref 1–1.03)
UR CULT HOLD, URHOLD: NORMAL
UROBILINOGEN UR QL STRIP.AUTO: 0.2 EU/DL (ref 0.2–1)
WBC # BLD AUTO: 15.6 K/UL (ref 3.6–11)
WBC URNS QL MICRO: >100 /HPF (ref 0–4)

## 2018-05-16 PROCEDURE — 84702 CHORIONIC GONADOTROPIN TEST: CPT | Performed by: EMERGENCY MEDICINE

## 2018-05-16 PROCEDURE — 85025 COMPLETE CBC W/AUTO DIFF WBC: CPT | Performed by: EMERGENCY MEDICINE

## 2018-05-16 PROCEDURE — 99282 EMERGENCY DEPT VISIT SF MDM: CPT

## 2018-05-16 PROCEDURE — 87077 CULTURE AEROBIC IDENTIFY: CPT | Performed by: EMERGENCY MEDICINE

## 2018-05-16 PROCEDURE — 74011250636 HC RX REV CODE- 250/636: Performed by: EMERGENCY MEDICINE

## 2018-05-16 PROCEDURE — 87186 SC STD MICRODIL/AGAR DIL: CPT | Performed by: EMERGENCY MEDICINE

## 2018-05-16 PROCEDURE — 81001 URINALYSIS AUTO W/SCOPE: CPT | Performed by: EMERGENCY MEDICINE

## 2018-05-16 PROCEDURE — 76770 US EXAM ABDO BACK WALL COMP: CPT

## 2018-05-16 PROCEDURE — 74011000258 HC RX REV CODE- 258: Performed by: EMERGENCY MEDICINE

## 2018-05-16 PROCEDURE — 80053 COMPREHEN METABOLIC PANEL: CPT | Performed by: EMERGENCY MEDICINE

## 2018-05-16 PROCEDURE — 36415 COLL VENOUS BLD VENIPUNCTURE: CPT | Performed by: EMERGENCY MEDICINE

## 2018-05-16 PROCEDURE — 87086 URINE CULTURE/COLONY COUNT: CPT | Performed by: EMERGENCY MEDICINE

## 2018-05-16 PROCEDURE — 96365 THER/PROPH/DIAG IV INF INIT: CPT

## 2018-05-16 RX ADMIN — CEFTRIAXONE 2 G: 2 INJECTION, POWDER, FOR SOLUTION INTRAMUSCULAR; INTRAVENOUS at 22:35

## 2018-05-16 NOTE — LETTER
NOTIFICATION RETURN TO WORK / SCHOOL 
 
5/17/2018 12:21 AM 
 
Ms. Olga Sam [address] [facility] [address] To Whom It May Concern: 
 
Olga Sam is currently under the care of [facility] [place] [facility]. She will return to work/school on: 5/18/2018. Please excuse from work for today. If there are questions or concerns please have the patient contact our office. Sincerely, Agustina Bro MD

## 2018-05-17 VITALS
HEART RATE: 95 BPM | DIASTOLIC BLOOD PRESSURE: 84 MMHG | SYSTOLIC BLOOD PRESSURE: 142 MMHG | WEIGHT: 293 LBS | HEIGHT: 71 IN | TEMPERATURE: 98 F | RESPIRATION RATE: 16 BRPM | OXYGEN SATURATION: 97 % | BODY MASS INDEX: 41.02 KG/M2

## 2018-05-17 RX ORDER — CEFDINIR 300 MG/1
300 CAPSULE ORAL 2 TIMES DAILY
Qty: 20 CAP | Refills: 0 | Status: SHIPPED | OUTPATIENT
Start: 2018-05-17 | End: 2019-04-04 | Stop reason: ALTCHOICE

## 2018-05-17 NOTE — DISCHARGE INSTRUCTIONS
Kidney Infection: Care Instructions  Your Care Instructions    A kidney infection (pyelonephritis) is a type of urinary tract infection, or UTI. Most UTIs are bladder infections. Kidney infections tend to make people much sicker than bladder infections do. A kidney infection is also more serious because it can cause lasting damage if it is not treated quickly. Follow-up care is a key part of your treatment and safety. Be sure to make and go to all appointments, and call your doctor if you are having problems. It's also a good idea to know your test results and keep a list of the medicines you take. How can you care for yourself at home? · Take your antibiotics as directed. Do not stop taking them just because you feel better. You need to take the full course of antibiotics. · Drink plenty of water, enough so that your urine is light yellow or clear like water. This may help wash out bacteria that are causing the infection. If you have kidney, heart, or liver disease and have to limit fluids, talk with your doctor before you increase the amount of fluids you drink. · Urinate often. Try to empty your bladder each time. · To relieve pain, take a hot shower or lay a heating pad (set on low) over your lower belly. Never go to sleep with a heating pad in place. Put a thin cloth between the heating pad and your skin. To help prevent kidney infections  · Drink plenty of water each day. This helps you urinate often, which clears bacteria from your system. If you have kidney, heart, or liver disease and have to limit fluids, talk with your doctor before you increase the amount of fluids you drink. · Urinate when you have the urge. Do not hold your urine for a long time. Urinate before you go to sleep. · If you have symptoms of a bladder infection, such as burning when you urinate or having to urinate often, call your doctor so you can treat the problem before it gets worse.  If you do not treat a bladder infection quickly, it can spread to the kidney. · Men should keep the tip of the penis clean. If you are a woman, keep these ideas in mind:  · Urinate right after you have sex. · Change sanitary pads often. Avoid douches, feminine hygiene sprays, and other feminine hygiene products that have deodorants. · After going to the bathroom, wipe from front to back. When should you call for help? Call your doctor now or seek immediate medical care if:  ? · You have symptoms that a kidney infection is getting worse. These may include:  ¨ Pain or burning when you urinate. ¨ A frequent need to urinate without being able to pass much urine. ¨ Pain in the flank, which is just below the rib cage and above the waist on either side of the back. ¨ Blood in the urine. ¨ A fever. ? · You are vomiting or nauseated. ? Watch closely for changes in your health, and be sure to contact your doctor if:  ? · You do not get better as expected. Where can you learn more? Go to http://niko-yodit.info/. Enter I928 in the search box to learn more about \"Kidney Infection: Care Instructions. \"  Current as of: May 12, 2017  Content Version: 11.4  © 5990-3191 [employer]. Care instructions adapted under license by [employer] (which disclaims liability or warranty for this information). If you have questions about a medical condition or this instruction, always ask your healthcare professional. [place] [address] any warranty or liability for your use of this information.

## 2018-05-17 NOTE — ED PROVIDER NOTES
HPI Comments: 30 y.o. female with past medical history significant for ovarian cyst, uterine polyp, endometriosis, septic shock, asthma, miscarriage, in vitro fertilization, hypothyroidism, anxiety, rectal carcinoid tumor, HTN, Vitamin D deficiency, hypertriglyceridemia, and prediabetes who presents to the ED with chief complaint of flank pain. Patient reports being an IVF patient. Patient reports her fertility specialist is Dr. Roxi Gao. Maria De Jesus Chaves MD. Patient reports having two embryos implanted ~6 days ago; reports having a positive urine pregnancy test at home. Patient reports \"dull, aching\" right flank pain that radiates to her lower back, urinary frequency, and loss of appetite with onset ~1-2 days ago. Patient reports being seen at [facility] for her symptoms \"yesterday evening,\" reports having a urine culture sent and being discharged home. Patient reports calling Dr. Ana Maria Mayer office for worsening symptoms \"earlier today,\" reports being referred to the ED for further evaluation. Patient reports a history of endometriosis, rectal carcinoid, and neuroendocrine tumor. Patient reports undergoing an MRI of the abdomen in March of 2018; reports it showed gallstones. Patient denies fever, hematuria, and vaginal bleeding. There are no other acute medical concerns at this time. PCP: Rochelle Deng MD  Fertility Antonito: Dr. Roxi Gao. Maria De Jesus Chaves MD    Note written by Mary Carpio, as dictated by Efrem Gómez MD 10:11 PM     The history is provided by the patient. Past Medical History:   Diagnosis Date   Keith Lovett Asthma     Dr. Rayo Duke Benign essential hypertension 3/24/15    resolved with weight loss and dietary changes    BMI 36.0-36.9,adult     Endometriosis     History of prediabetes     resolved with diet, exercise and wt loss    Hypertriglyceridemia 3/28/15    Hypothyroid 2012    treated with Levothyroxine x3 months then stopped.  TFT have been normal since    In vitro fertilization     x2    Miscarriage     x2    Ovarian cyst     QT prolongation     d/t Trazodone; resolved after Trazodone discontinued    Rectal carcinoid tumor 2014    stage IIIB; colonic resection, lymph nodes removed, ileostomy 10/2014 (Dr. Myrna Wolfe); ileostomy take-down 2/3/15. No chemo/XRT.  Sees primarily Dr. Sonia Raman at [facility]. Also sees Dr. Kristin Lomax here if needed    Septic shock(785.52) 5    d/t complications from [facility] , laparoscopy - bowel was lacerated, exploratory surgery    Uterine polyp     Vitamin D deficiency 3/28/15       Past Surgical History:   Procedure Laterality Date    HX COLECTOMY  10/2014    rectosigmoid colon resection, ileostomy (Dr. Myrna Wolfe)    HX GI  2/3/15    ileostomy take-down (Dr. Myrna Wolfe)   Ramya Asa HX GYN      laproscopy and D&C - bowel knicked thus had full exploratory    HX GYN      IVF x 2    HX GYN      A2 (miscarriage)    HX OTHER SURGICAL  8/10/15    incisional hernia repair with mesh, lysis of adhesions (Dr. Myrna Wolfe)    HX POLYPECTOMY  2014 (x2)    2 rectal polyps (Dr. Vida Bolden, Dr. Indy Mcintyre)         Family History:   Problem Relation Age of Onset    Diabetes Mother     Hypertension Mother     Cancer Mother      uterine    Diabetes Father     Hypertension Father     Hypertension Brother     Diabetes Maternal Aunt     Heart Disease Maternal Aunt     Heart Attack Maternal Aunt     Diabetes Maternal Uncle     Heart Disease Maternal Uncle     Heart Attack Maternal Uncle     Stroke Paternal Grandmother     Colon Cancer Paternal Grandfather     Hypertension Brother     Other Brother      commited suicide    Cancer Maternal Aunt      colon     Cancer Maternal Aunt      lymphoma    Anesth Problems Neg Hx        Social History     Social History    Marital status:      Spouse name: N/A    Number of children: N/A    Years of education: N/A     Occupational History    travel [address] History Main Topics    Smoking status: Former Smoker     Quit date: 10/12/2000    Smokeless tobacco: Never Used      Comment: smoked cigarettes as a teenager - light smoker then    Alcohol use No    Drug use: No    Sexual activity: Yes     Partners: Male     Birth control/ protection: None     Other Topics Concern    Not on file     Social History Narrative    ** Merged History Encounter **         She is . No children. Works at [employer]. ALLERGIES: Review of patient's allergies indicates no known allergies. Review of Systems   Constitutional: Positive for appetite change. Negative for fever. Genitourinary: Positive for flank pain and frequency. Negative for hematuria and vaginal bleeding. All other systems reviewed and are negative. Vitals:    05/16/18 2041   BP: (!) 149/93   Pulse: (!) 101   Resp: 18   Temp: 98 °F (36.7 °C)   SpO2: 98%   Weight: 147.4 kg (325 lb)   Height: 5' 11\" (1.803 m)            Physical Exam   Nursing note and vitals reviewed. Constitutional: appears well-developed. No distress. OBESE. HENT:   Head: Normocephalic and atraumatic. Sclera anicteric  Nose: No rhinorrhea  Mouth/Throat: Oropharynx is clear and moist. Pharynx normal  Eyes: Conjunctivae are normal. Pupils are equal, round, and reactive to light. Right eye exhibits no discharge. Left eye exhibits no discharge. No scleral icterus. Neck: Painless normal range of motion. Supple  Cardiovascular: Normal rate, regular rhythm, normal heart sounds and intact distal pulses. Exam reveals no gallop and no friction rub. No murmur heard. Pulmonary/Chest: Effort normal and breath sounds normal. No respiratory distress. no wheezes. no rales. Abdominal: Soft. Bowel sounds are normal. Exhibits no distension and no mass. No tenderness. No guarding. Musculoskeletal: Normal range of motion. no tenderness. No edema. NO CVA TENDERNESS. Lymphadenopathy:   No cervical adenopathy.    Neurological:  Alert and oriented to person, place, and time. Coordination normal. CN 2-12 intact. Moving all extremities. Skin: Skin is warm and dry. No rash noted. No pallor. Note written by Mary Nash, as dictated by Radha Michaud MD 10:17 PM    [facility]      ED Course     Right flank pain but no cva tenderness. Renal ultrasound negative. Wbc 15k. No vomiting or fever. Suspect possible early pyelo. Treated with rocephin. Urine cx added. D/w Dr. Isak Neumann, OB, who agrees with rocephin and omnicef. Agrees with f/u with her OB. Procedures    12:19 AM  Patient's results have been reviewed with them. Patient and/or family have verbally conveyed their understanding and agreement of the patient's signs, symptoms, diagnosis, treatment and prognosis and additionally agree to follow up as recommended or return to the Emergency Room should their condition change prior to follow-up. Discharge instructions have also been provided to the patient with some educational information regarding their diagnosis as well a list of reasons why they would want to return to the ER prior to their follow-up appointment should their condition change. Recent Results (from the past 24 hour(s))   CBC WITH AUTOMATED DIFF    Collection Time: 05/16/18  9:14 PM   Result Value Ref Range    WBC 15.6 (H) 3.6 - 11.0 K/uL    RBC 4.37 3.80 - 5.20 M/uL    HGB 13.4 11.5 - 16.0 g/dL    HCT 39.6 35.0 - 47.0 %    MCV 90.6 80.0 - 99.0 FL    MCH 30.7 26.0 - 34.0 PG    MCHC 33.8 30.0 - 36.5 g/dL    RDW 12.6 11.5 - 14.5 %    PLATELET 896 551 - 350 K/uL    MPV 9.6 8.9 - 12.9 FL    NRBC 0.0 0  WBC    ABSOLUTE NRBC 0.00 0.00 - 0.01 K/uL    NEUTROPHILS 70 32 - 75 %    LYMPHOCYTES 21 12 - 49 %    MONOCYTES 8 5 - 13 %    EOSINOPHILS 1 0 - 7 %    BASOPHILS 1 0 - 1 %    IMMATURE GRANULOCYTES 0 0.0 - 0.5 %    ABS. NEUTROPHILS 10.9 (H) 1.8 - 8.0 K/UL    ABS. LYMPHOCYTES 3.2 0.8 - 3.5 K/UL    ABS. MONOCYTES 1.3 (H) 0.0 - 1.0 K/UL    ABS. EOSINOPHILS 0.1 0.0 - 0.4 K/UL    ABS. BASOPHILS 0.1 0.0 - 0.1 K/UL    ABS. IMM. GRANS. 0.1 (H) 0.00 - 0.04 K/UL    DF AUTOMATED     METABOLIC PANEL, COMPREHENSIVE    Collection Time: 05/16/18  9:14 PM   Result Value Ref Range    Sodium 138 136 - 145 mmol/L    Potassium 3.7 3.5 - 5.1 mmol/L    Chloride 102 97 - 108 mmol/L    CO2 22 21 - 32 mmol/L    Anion gap 14 5 - 15 mmol/L    Glucose 96 65 - 100 mg/dL    BUN 9 6 - 20 MG/DL    Creatinine 0.66 0.55 - 1.02 MG/DL    BUN/Creatinine ratio 14 12 - 20      GFR est AA >60 >60 ml/min/1.73m2    GFR est non-AA >60 >60 ml/min/1.73m2    Calcium 8.6 8.5 - 10.1 MG/DL    Bilirubin, total 1.2 (H) 0.2 - 1.0 MG/DL    ALT (SGPT) 27 12 - 78 U/L    AST (SGOT) 12 (L) 15 - 37 U/L    Alk. phosphatase 75 45 - 117 U/L    Protein, total 7.5 6.4 - 8.2 g/dL    Albumin 3.6 3.5 - 5.0 g/dL    Globulin 3.9 2.0 - 4.0 g/dL    A-G Ratio 0.9 (L) 1.1 - 2.2     URINALYSIS W/MICROSCOPIC    Collection Time: 05/16/18  9:14 PM   Result Value Ref Range    Color YELLOW/STRAW      Appearance CLOUDY (A) CLEAR      Specific gravity 1.010 1.003 - 1.030      pH (UA) 6.0 5.0 - 8.0      Protein 30 (A) NEG mg/dL    Glucose NEGATIVE  NEG mg/dL    Ketone NEGATIVE  NEG mg/dL    Bilirubin NEGATIVE  NEG      Blood LARGE (A) NEG      Urobilinogen 0.2 0.2 - 1.0 EU/dL    Nitrites NEGATIVE  NEG      Leukocyte Esterase LARGE (A) NEG      WBC >100 (H) 0 - 4 /hpf    RBC 20-50 0 - 5 /hpf    Epithelial cells FEW FEW /lpf    Bacteria 1+ (A) NEG /hpf    Hyaline cast 2-5 0 - 5 /lpf   URINE CULTURE HOLD SAMPLE    Collection Time: 05/16/18  9:14 PM   Result Value Ref Range    Urine culture hold        URINE ON HOLD IN MICROBIOLOGY DEPT FOR 3 DAYS. IF UNPRESERVED URINE IS SUBMITTED, IT CANNOT BE USED FOR ADDITIONAL TESTING AFTER 24 HRS, RECOLLECTION WILL BE REQUIRED.    BETA HCG, QT    Collection Time: 05/16/18  9:14 PM   Result Value Ref Range    Beta HCG, QT 76 (H) 0 - 6 MIU/ML       Us Retroperitoneum Comp    Result Date: 5/16/2018  EXAM:  US RETROPERITONEUM COMP INDICATION: Right flank pain with urinary tract infection. COMPARISON: CT 7/10/2017. TECHNIQUE:  Real-time sonography of the kidneys, retroperitoneum and bladder was performed with multiple static images obtained. FINDINGS: The kidneys are normal in size and echogenicity with no stone, mass, or hydronephrosis identified. The right kidney measures 10.8 cm and the left kidney measures 10.2 cm. The aorta is normal in size without aneurysm. The proximal iliac arteries are obscured by overlying bowel gas/body habitus. The IVC is appears normal. No retroperitoneal mass is identified. The urinary bladder is incompletely distended and not well evaluated. IMPRESSION: No acute findings.

## 2018-05-17 NOTE — ED TRIAGE NOTES
Patient presents to the emergency department reporting right sided flank pain. Patient was worried because she is an IVF patient, and is about 4 weeks pregnant. Patient states she has a known kidney stone, and is having some \"irritation\" with urination. Patient reports lack of appetite. Patient denies any spotting or bleeding. Patient denies any nausea, vomiting, or diarrhea.

## 2018-05-18 LAB
BACTERIA SPEC CULT: ABNORMAL
CC UR VC: ABNORMAL
SERVICE CMNT-IMP: ABNORMAL

## 2019-03-07 ENCOUNTER — HOSPITAL ENCOUNTER (OUTPATIENT)
Dept: GENERAL RADIOLOGY | Age: 39
Discharge: HOME | End: 2019-03-07
Payer: COMMERCIAL

## 2019-03-07 ENCOUNTER — HOSPITAL ENCOUNTER (OUTPATIENT)
Dept: CT IMAGING | Age: 39
Discharge: HOME | End: 2019-03-07
Payer: COMMERCIAL

## 2019-03-07 DIAGNOSIS — D3A.8 NEUROENDOCRINE TUMOR: ICD-10-CM

## 2019-03-07 LAB — CREAT BLD-MCNC: 0.8 MG/DL (ref 0.6–1.3)

## 2019-03-07 PROCEDURE — 74170 CT ABD WO CNTRST FLWD CNTRST: CPT

## 2019-03-07 PROCEDURE — 74011636320 HC RX REV CODE- 636/320: Performed by: RADIOLOGY

## 2019-03-07 PROCEDURE — 82565 ASSAY OF CREATININE: CPT

## 2019-03-07 PROCEDURE — 74011000258 HC RX REV CODE- 258: Performed by: RADIOLOGY

## 2019-03-07 PROCEDURE — 71046 X-RAY EXAM CHEST 2 VIEWS: CPT

## 2019-03-07 RX ORDER — SODIUM CHLORIDE 0.9 % (FLUSH) 0.9 %
10 SYRINGE (ML) INJECTION
Status: COMPLETED | OUTPATIENT
Start: 2019-03-07 | End: 2019-03-07

## 2019-03-07 RX ADMIN — IOPAMIDOL 100 ML: 755 INJECTION, SOLUTION INTRAVENOUS at 08:43

## 2019-03-07 RX ADMIN — SODIUM CHLORIDE 100 ML: 900 INJECTION, SOLUTION INTRAVENOUS at 08:43

## 2019-03-07 RX ADMIN — Medication 10 ML: at 08:43

## 2019-04-04 ENCOUNTER — OFFICE VISIT (OUTPATIENT)
Dept: INTERNAL MEDICINE CLINIC | Age: 39
End: 2019-04-04

## 2019-04-04 VITALS
SYSTOLIC BLOOD PRESSURE: 120 MMHG | TEMPERATURE: 98.3 F | HEIGHT: 72 IN | WEIGHT: 293 LBS | HEART RATE: 73 BPM | RESPIRATION RATE: 18 BRPM | OXYGEN SATURATION: 97 % | DIASTOLIC BLOOD PRESSURE: 90 MMHG | BODY MASS INDEX: 39.68 KG/M2

## 2019-04-04 DIAGNOSIS — Z13.29 THYROID DISORDER SCREEN: ICD-10-CM

## 2019-04-04 DIAGNOSIS — O24.410 DIET CONTROLLED GESTATIONAL DIABETES MELLITUS (GDM) IN THIRD TRIMESTER: Primary | ICD-10-CM

## 2019-04-04 DIAGNOSIS — F41.9 ANXIETY: ICD-10-CM

## 2019-04-04 DIAGNOSIS — E78.1 HYPERTRIGLYCERIDEMIA: ICD-10-CM

## 2019-04-04 NOTE — PROGRESS NOTES
New Patient Evaluation Katherine Rodriguez is a 41 y.o. female. They are here to establish care with the group and me as a primary care provider. she has seen Dr. Heike Badillo in the past.  The last visit was in February. The patient reports having ongoing issues with weight. She lost 106 lbs last year. She had gestational diabetes while pregnant. Son is 1 months old. She notes that her sugars have normalized during the day. Her fasting blood sugars are high no matter what she eats. She is working on the diet/lifestyle changes. She sees OB. In her 21's she had endometriosis. She had a D/C and nicked her bowel. Was septic and on life support at that time and had a long recovery. 5 years ago was diagnosed with Stage 3 neuroendocrine tumor at her rectum. Had a resection, loop illeostomy. Now cancer free for 5 years. She follows with Dr. Eri Correa. She follows up at [facility] in [place]. She also sees Dr. Darlene Ruth for Colorectal surgery. She also has a history of gallstones. Noted on her scans Lastly she has had an ongoing vit D deficiency. Took Vit D gummies when pregnant. Patient Active Problem List  
 Diagnosis Date Noted  Chronic fatigue 10/27/2016  History of irregular menstrual cycles 10/27/2016  History of prediabetes  Obesity, morbid, BMI 40.0-49.9 ([address]  Incisional hernia 08/10/2015  Vitamin D deficiency 03/28/2015  Hypertriglyceridemia 03/28/2015  Pap smear for cervical cancer screening 03/24/2015  Benign essential hypertension 03/24/2015  Ovarian cyst   
 Uterine polyp  Endometriosis  Septic shock(785.52)  Asthma  Miscarriage  In vitro fertilization  Anxiety  QT prolongation  Rectal carcinoid tumor 08/01/2014 Current Outpatient Medications Medication Sig Dispense Refill  PRENATAL /IRON/FOLIC ACID (PRENATAL MULTI PO) Take  by mouth.  psyllium (METAMUCIL) packet Take 1 Packet by mouth daily. No Known Allergies Past Medical History:  
Diagnosis Date  Anxiety  Asthma Dr. Jonas Cope  Benign essential hypertension 3/24/15  
 resolved with weight loss and dietary changes  BMI 36.0-36.9,adult  Endometriosis  History of prediabetes   
 resolved with diet, exercise and wt loss  Hypertriglyceridemia 3/28/15  Hypothyroid   
 treated with Levothyroxine x3 months then stopped. TFT have been normal since  In vitro fertilization x2  Miscarriage x2  Ovarian cyst   
 QT prolongation d/t Trazodone; resolved after Trazodone discontinued  Rectal carcinoid tumor 2014  
 stage IIIB; colonic resection, lymph nodes removed, ileostomy 10/2014 (Dr. New Walker); ileostomy take-down 2/3/15. No chemo/XRT. Se[facility] primarily [facility] Curly Moore at [facility]. Also sees Dr. Sona Mancuso here if needed  Septic shock(785.60) [id number]  
 d/t complications from [facility] , laparoscopy - bowel was lacerated, exploratory surgery  Uterine polyp  Vitamin D deficiency 3/28/15 Past Surgical History:  
Procedure Laterality Date  CHEST SURGERY PROCEDURE UNLISTED    HX  SECTION  2019  HX COLECTOMY  10/2014  
 rectosigmoid colon resection, ileostomy (Dr. New Walker)  HX DILATION AND CURETTAGE    HX GI  2/3/15  
 ileostomy take-down (Dr. New Walker)  HX GYN    
 laproscopy and D&C - bowel knicked thus had full exploratory  HX GYN    
 IVF x 2  
 HX GYN    
 A2 (miscarriage)  HX HERNIA REPAIR  2005  
 x 2  
 HX OTHER SURGICAL  8/10/15 incisional hernia repair with mesh, lysis of adhesions (Dr. New Walker)  HX POLYPECTOMY  2014 (x2)  
 2 rectal polyps (Dr. Diego Chowdary, Dr. Shu Cho) Family History Problem Relation Age of Onset  Diabetes Mother  Hypertension Mother  Cancer Mother   
     uterine  Diabetes Father  Hypertension Father  Hypertension Brother  Diabetes Maternal Aunt  Heart Disease Maternal Aunt  Heart Attack Maternal Aunt  Diabetes Maternal Uncle  Heart Disease Maternal Uncle  Heart Attack Maternal Uncle  Stroke Paternal Grandmother  Colon Cancer Paternal Grandfather  Hypertension Brother  Other Brother   
     commited suicide  Cancer Maternal Aunt   
     colon  Cancer Maternal Aunt   
     lymphoma  Anesth Problems Neg Hx Social History Tobacco Use  Smoking status: Former Smoker Last attempt to quit: 10/12/2000 Years since quittin.4  Smokeless tobacco: Never Used  Tobacco comment: smoked cigarettes as a teenager - light smoker then Substance Use Topics  Alcohol use: No  
  
 
Health Maintenance Topic Date Due  Pneumococcal 0-64 years (1 of 1 - PPSV23) 1988  PAP AKA CERVICAL CYTOLOGY  2018  Influenza Age 5 to Adult  2019  
 DTaP/Tdap/Td series (3 - Td) 10/29/2028 Review of Systems Eyes: Negative. Cardiovascular: Negative. Skin: Negative. Visit Vitals /90 (BP 1 Location: Right arm, BP Patient Position: Sitting) Pulse 73 Temp 98.3 °F (36.8 °C) (Oral) Resp 18 Ht 5' 11.61\" (1.819 m) Wt 304 lb 9.6 oz (138.2 kg) LMP 2019 SpO2 97% Breastfeeding? Unknown BMI 41.76 kg/m² Physical Exam  
Constitutional: No distress. Cardiovascular: Normal rate and regular rhythm. No murmur heard. Pulmonary/Chest: Effort normal and breath sounds normal.  
 
 
 
 
ASSESSMENT/PLAN Diagnoses and all orders for this visit: 1. Diet controlled gestational diabetes mellitus (GDM) in third trimester 
-     CBC WITH AUTOMATED DIFF 
-     HEMOGLOBIN A1C WITH EAG 2. Thyroid disorder screen 
-     TSH 3RD GENERATION 3. Hypertriglyceridemia -     LIPID PANEL 
-     METABOLIC PANEL, COMPREHENSIVE 4. Anxiety Follow-up and Dispositions · Return in about 3 months (around 7/4/2019) for Full Physical - 30 minutes appointment. 
  
 
 
  
 
 
 
-Discussed with the patient to continue the current plan of care. We will obtain baseline labwork and determine if any adjustments need to be done. We will also await the records of the previous PCP to ascertain further details of the patient's history. The patient agrees with and understands the plan of care. All questions have been answered.

## 2019-04-04 NOTE — PROGRESS NOTES
Medications Discontinued During This Encounter Medication Reason  cefdinir (OMNICEF) 300 mg capsule Therapy Completed  metFORMIN (GLUCOPHAGE) 1,000 mg tablet Therapy Completed  ergocalciferol (VITAMIN D2) 50,000 unit capsule Therapy Completed  DOCOSAHEXANOIC ACID/EPA (FISH OIL PO) Therapy Completed  cyanocobalamin (VITAMIN B-12) 1,000 mcg tablet Therapy Completed  albuterol (PROAIR HFA) 90 mcg/actuation inhaler Therapy Completed Verbal order read back Dr. Tomas browning to remove  medication from list.

## 2019-04-05 LAB
ALBUMIN SERPL-MCNC: 4.3 G/DL (ref 3.5–5.5)
ALBUMIN/GLOB SERPL: 1.7 {RATIO} (ref 1.2–2.2)
ALP SERPL-CCNC: 94 IU/L (ref 39–117)
ALT SERPL-CCNC: 43 IU/L (ref 0–32)
AST SERPL-CCNC: 23 IU/L (ref 0–40)
BASOPHILS # BLD AUTO: 0 X10E3/UL (ref 0–0.2)
BASOPHILS NFR BLD AUTO: 0 %
BILIRUB SERPL-MCNC: 0.7 MG/DL (ref 0–1.2)
BUN SERPL-MCNC: 16 MG/DL (ref 6–20)
BUN/CREAT SERPL: 22 (ref 9–23)
CALCIUM SERPL-MCNC: 9.2 MG/DL (ref 8.7–10.2)
CHLORIDE SERPL-SCNC: 104 MMOL/L (ref 96–106)
CHOLEST SERPL-MCNC: 174 MG/DL (ref 100–199)
CO2 SERPL-SCNC: 24 MMOL/L (ref 20–29)
CREAT SERPL-MCNC: 0.72 MG/DL (ref 0.57–1)
EOSINOPHIL # BLD AUTO: 0.1 X10E3/UL (ref 0–0.4)
EOSINOPHIL NFR BLD AUTO: 1 %
ERYTHROCYTE [DISTWIDTH] IN BLOOD BY AUTOMATED COUNT: 13.5 % (ref 12.3–15.4)
EST. AVERAGE GLUCOSE BLD GHB EST-MCNC: 105 MG/DL
GLOBULIN SER CALC-MCNC: 2.6 G/DL (ref 1.5–4.5)
GLUCOSE SERPL-MCNC: 82 MG/DL (ref 65–99)
HBA1C MFR BLD: 5.3 % (ref 4.8–5.6)
HCT VFR BLD AUTO: 41.3 % (ref 34–46.6)
HDLC SERPL-MCNC: 51 MG/DL
HGB BLD-MCNC: 13.5 G/DL (ref 11.1–15.9)
IMM GRANULOCYTES # BLD AUTO: 0 X10E3/UL (ref 0–0.1)
IMM GRANULOCYTES NFR BLD AUTO: 0 %
LDLC SERPL CALC-MCNC: 94 MG/DL (ref 0–99)
LYMPHOCYTES # BLD AUTO: 2.1 X10E3/UL (ref 0.7–3.1)
LYMPHOCYTES NFR BLD AUTO: 23 %
MCH RBC QN AUTO: 28.4 PG (ref 26.6–33)
MCHC RBC AUTO-ENTMCNC: 32.7 G/DL (ref 31.5–35.7)
MCV RBC AUTO: 87 FL (ref 79–97)
MONOCYTES # BLD AUTO: 0.9 X10E3/UL (ref 0.1–0.9)
MONOCYTES NFR BLD AUTO: 10 %
NEUTROPHILS # BLD AUTO: 6 X10E3/UL (ref 1.4–7)
NEUTROPHILS NFR BLD AUTO: 66 %
PLATELET # BLD AUTO: 355 X10E3/UL (ref 150–379)
POTASSIUM SERPL-SCNC: 4.5 MMOL/L (ref 3.5–5.2)
PROT SERPL-MCNC: 6.9 G/DL (ref 6–8.5)
RBC # BLD AUTO: 4.75 X10E6/UL (ref 3.77–5.28)
SODIUM SERPL-SCNC: 143 MMOL/L (ref 134–144)
TRIGL SERPL-MCNC: 144 MG/DL (ref 0–149)
TSH SERPL DL<=0.005 MIU/L-ACNC: 2.26 UIU/ML (ref 0.45–4.5)
VLDLC SERPL CALC-MCNC: 29 MG/DL (ref 5–40)
WBC # BLD AUTO: 9.1 X10E3/UL (ref 3.4–10.8)

## 2019-05-24 ENCOUNTER — HOSPITAL ENCOUNTER (OUTPATIENT)
Dept: NUCLEAR MEDICINE | Age: 39
Discharge: HOME | End: 2019-05-24
Attending: INTERNAL MEDICINE
Payer: COMMERCIAL

## 2019-05-24 VITALS — WEIGHT: 293 LBS | BODY MASS INDEX: 41.4 KG/M2

## 2019-05-24 DIAGNOSIS — R10.11 RIGHT UPPER QUADRANT PAIN: ICD-10-CM

## 2019-05-24 PROCEDURE — 78226 HEPATOBILIARY SYSTEM IMAGING: CPT

## 2019-05-24 RX ORDER — SODIUM CHLORIDE 0.9 % (FLUSH) 0.9 %
10 SYRINGE (ML) INJECTION
Status: DISPENSED | OUTPATIENT
Start: 2019-05-24 | End: 2019-05-24

## 2019-05-24 RX ORDER — SODIUM CHLORIDE 9 MG/ML
25 INJECTION, SOLUTION INTRAVENOUS
Status: DISCONTINUED | OUTPATIENT
Start: 2019-05-24 | End: 2019-05-24

## 2020-01-29 ENCOUNTER — OFFICE VISIT (OUTPATIENT)
Dept: INTERNAL MEDICINE CLINIC | Age: 40
End: 2020-01-29

## 2020-01-29 VITALS
TEMPERATURE: 97.7 F | DIASTOLIC BLOOD PRESSURE: 84 MMHG | RESPIRATION RATE: 16 BRPM | WEIGHT: 293 LBS | BODY MASS INDEX: 41.02 KG/M2 | OXYGEN SATURATION: 96 % | HEIGHT: 71 IN | HEART RATE: 109 BPM | SYSTOLIC BLOOD PRESSURE: 126 MMHG

## 2020-01-29 RX ORDER — BLOOD SUGAR DIAGNOSTIC
STRIP MISCELLANEOUS
COMMUNITY
Start: 2019-12-30 | End: 2020-06-15

## 2020-01-29 RX ORDER — GUAIFENESIN 100 MG/5ML
81 LIQUID (ML) ORAL DAILY
COMMUNITY
End: 2020-06-15

## 2020-01-29 RX ORDER — OMEPRAZOLE 20 MG/1
20 CAPSULE, DELAYED RELEASE ORAL DAILY
COMMUNITY
Start: 2019-09-11

## 2020-04-20 ENCOUNTER — TELEPHONE (OUTPATIENT)
Dept: INTERNAL MEDICINE CLINIC | Age: 40
End: 2020-04-20

## 2020-04-20 RX ORDER — SERTRALINE HYDROCHLORIDE 50 MG/1
50 TABLET, FILM COATED ORAL DAILY
COMMUNITY
End: 2020-12-08 | Stop reason: ALTCHOICE

## 2020-04-21 ENCOUNTER — TELEPHONE (OUTPATIENT)
Dept: INTERNAL MEDICINE CLINIC | Age: 40
End: 2020-04-21

## 2020-04-21 DIAGNOSIS — E03.9 ACQUIRED HYPOTHYROIDISM: ICD-10-CM

## 2020-04-21 DIAGNOSIS — R00.2 PALPITATIONS: Primary | ICD-10-CM

## 2020-04-21 DIAGNOSIS — R53.83 FATIGUE, UNSPECIFIED TYPE: ICD-10-CM

## 2020-04-21 DIAGNOSIS — E66.01 OBESITY, MORBID, BMI 40.0-49.9 (HCC): ICD-10-CM

## 2020-04-21 NOTE — TELEPHONE ENCOUNTER
----- Message from Kenton Shepherd MD sent at 2020 10:01 AM EDT -----  Regardin RENA Ranjan Bradley [phone]  Contact: [phone]  Yes, please order these    ----- Message -----  From: Ronny Gasca LPN  Sent:    9:59 AM EDT  To: Kenton Shepherd MD  Subject: Caroline Zuletah [phone]                Dr Jese Mcbride do you want to order labs for her?  ----- Message -----  From: Leslye Sweet  Sent: 2020   8:40 AM EDT  To: Maykel Vaughn [facility]  Subject: [id number] Bradley [phone]                Good Morning,  [facility] and [facility] at [facility]. I called this morning and was unable to reach anyone I'm not sure if they are having issues with their phones. but the good news is this morning I woke up and the plantations were gone. If they come back I will certainly let you know. As far as getting the blood work and tests done. ...is there a way I can go somewhere and not have someone come to my house? My kids are at home and I don't want to expose them.

## 2020-05-15 ENCOUNTER — TELEPHONE (OUTPATIENT)
Dept: INTERNAL MEDICINE CLINIC | Age: 40
End: 2020-05-15

## 2020-05-15 ENCOUNTER — HOSPITAL ENCOUNTER (OUTPATIENT)
Dept: GENERAL RADIOLOGY | Age: 40
Discharge: HOME | End: 2020-05-15
Payer: COMMERCIAL

## 2020-05-15 ENCOUNTER — HOSPITAL ENCOUNTER (OUTPATIENT)
Dept: CT IMAGING | Age: 40
Discharge: HOME | End: 2020-05-15
Payer: COMMERCIAL

## 2020-05-15 DIAGNOSIS — I49.9 ARRHYTHMIA: ICD-10-CM

## 2020-05-15 DIAGNOSIS — D3A.8 NEUROENDOCRINE TUMOR: ICD-10-CM

## 2020-05-15 PROCEDURE — 74170 CT ABD WO CNTRST FLWD CNTRST: CPT

## 2020-05-15 PROCEDURE — 74011000258 HC RX REV CODE- 258: Performed by: RADIOLOGY

## 2020-05-15 PROCEDURE — 74011636320 HC RX REV CODE- 636/320: Performed by: RADIOLOGY

## 2020-05-15 PROCEDURE — 71046 X-RAY EXAM CHEST 2 VIEWS: CPT

## 2020-05-15 RX ORDER — SODIUM CHLORIDE 0.9 % (FLUSH) 0.9 %
10 SYRINGE (ML) INJECTION
Status: COMPLETED | OUTPATIENT
Start: 2020-05-15 | End: 2020-05-15

## 2020-05-15 RX ADMIN — Medication 10 ML: at 08:52

## 2020-05-15 RX ADMIN — IOPAMIDOL 100 ML: 755 INJECTION, SOLUTION INTRAVENOUS at 08:52

## 2020-05-15 RX ADMIN — SODIUM CHLORIDE 100 ML: 900 INJECTION, SOLUTION INTRAVENOUS at 08:52

## 2020-05-15 NOTE — TELEPHONE ENCOUNTER
[id number]    The patient would like to move to Dr Davina Temple. Is That ok?  She said no reason why she would like to change

## 2020-05-15 NOTE — TELEPHONE ENCOUNTER
I have never seen her. Does not looks like I see any relatives. Only saw Dr Alexandre Gatica once last year, previous [facility] patient. Why does she want to transfer? Would stick with him unless a specific concern.

## 2020-05-19 NOTE — TELEPHONE ENCOUNTER
Patient states originally referred by Dr Lynda Chaidez to Dr Jacinda Varner but at the time was not accepting new patient. Patient established with Dr Jazzmine Holloway due to availability at the time. Would like to switch providers, now accepting new patients.

## 2020-05-19 NOTE — TELEPHONE ENCOUNTER
Normally, I would say should stay with current provider, but does not look like Dr Claudette Agreste is actively doing anything for her. I'm okay with taking her if he is okay, since it will likely be more of a hassle. Rikki Richardson, if you are okay with it then send it to the  and they can schedule a new visit with me.

## 2020-06-12 ENCOUNTER — TELEPHONE (OUTPATIENT)
Dept: INTERNAL MEDICINE CLINIC | Age: 40
End: 2020-06-12

## 2020-06-12 NOTE — TELEPHONE ENCOUNTER
Travel screen negative. She asked if she could bring her 2 month old daughter who was breast feeding with her. I told her no, she could not. If she needed to be close a family member or friend could keep her in the lobby or outside if she wanted to risk that but could not come into the office. She hung up on me.

## 2020-06-15 ENCOUNTER — OFFICE VISIT (OUTPATIENT)
Dept: INTERNAL MEDICINE CLINIC | Age: 40
End: 2020-06-15

## 2020-06-15 VITALS
RESPIRATION RATE: 14 BRPM | OXYGEN SATURATION: 98 % | BODY MASS INDEX: 41.02 KG/M2 | WEIGHT: 293 LBS | TEMPERATURE: 98 F | SYSTOLIC BLOOD PRESSURE: 128 MMHG | HEART RATE: 84 BPM | DIASTOLIC BLOOD PRESSURE: 80 MMHG | HEIGHT: 71 IN

## 2020-06-15 DIAGNOSIS — J45.20 MILD INTERMITTENT ASTHMA WITHOUT COMPLICATION: ICD-10-CM

## 2020-06-15 DIAGNOSIS — Z01.818 PRE-OP EXAMINATION: ICD-10-CM

## 2020-06-15 DIAGNOSIS — E66.01 OBESITY, MORBID, BMI 40.0-49.9 (HCC): ICD-10-CM

## 2020-06-15 DIAGNOSIS — Z00.00 ROUTINE PHYSICAL EXAMINATION: Primary | ICD-10-CM

## 2020-06-15 DIAGNOSIS — R48.0 DYSLEXIA: ICD-10-CM

## 2020-06-15 DIAGNOSIS — F33.0 MILD EPISODE OF RECURRENT MAJOR DEPRESSIVE DISORDER (HCC): ICD-10-CM

## 2020-06-15 DIAGNOSIS — M65.4 TENDINITIS, DE QUERVAIN'S: ICD-10-CM

## 2020-06-15 DIAGNOSIS — Z85.048 HISTORY OF RECTAL CANCER: ICD-10-CM

## 2020-06-15 DIAGNOSIS — R53.82 CHRONIC FATIGUE: ICD-10-CM

## 2020-06-15 DIAGNOSIS — G47.33 OBSTRUCTIVE SLEEP APNEA SYNDROME: ICD-10-CM

## 2020-06-15 DIAGNOSIS — F41.9 ANXIETY: ICD-10-CM

## 2020-06-15 PROBLEM — F33.9 RECURRENT MAJOR DEPRESSIVE DISORDER (HCC): Status: ACTIVE | Noted: 2020-06-15

## 2020-06-15 NOTE — PROGRESS NOTES
Establish care & preop visit    Procedure/Surgery: wrist surgery   Date of Procedure/Surgery: 07/06/20   Surgeon:  Jamaal Frias MD  Hospital/Surgical Facility:[address]

## 2020-06-15 NOTE — PROGRESS NOTES
Danielle Ibarra is a 42y.o. year old female who is a new patient to me today (06/15/20). Previous patient of Dr Anmol Lyons. Assessment & Plan:   Diagnoses and all orders for this visit:    1. Routine physical examination    2. Pre-op examination- no contra-indications to planned surgery    3. Tendinitis, de Quervain's    4. Anxiety- new dx to me, chronic problem, good control, reviewed treatment options with her, reviewed life style changes to help improve mood, reviewed role of daily vs prn meds, continue current treatment. Would not recommend increase or decrease at this time     5. Mild episode of recurrent major depressive disorder ([facility]- new dx to me, worse due to postpartum, no changes, see above    6. Mild intermittent asthma without complication- new diagnosis to me, chronic problem, well controlled, off medications     7. Dyslexia- new dx to me, chronic issue, getting worse, likely due to stress & sleep, list of [place] [address] providers on AVS, she will schedule f/u.    8. Obesity, morbid, BMI 40.0-49.9 ([facility]- new diagnosis to me, chronic problem, stable, poorly controlled, needs improvement, will work on portion size but no other diet changes due to breast feeding     9. History of rectal cancer- new dx to me, chronic issue, in remission, specialist records requested    10. Chronic fatigue- new diagnosis to me, chronic problem, stable, needs improvement, reviewed differential dx and at this time favor uncontrolled RONY since no obvious medication or metablolic issue. See below     11. Obstructive sleep apnea syndrome- new diagnosis to me, chronic problem, poorly controlled, reviewed revisiting options for CPAP vs dental appliance, reviewed this is likely the cause of fatigue since no other obvious etiology. Follow-up and Dispositions    · Return in about 3 months (around 9/15/2020) for Regular follow up.         Subjective:   Mali Resendizerer was seen today for Establish Care and Pre-op Exam      Health Maintenance  Immunizations:    Influenza: up to date. Tetanus: up to date. Cancer screening:   Cervical: UTD. Breast: reviewed guidelines, UTD, done by OBGYN, records requested      Pre-op Review:  Procedure/Surgery: L wrist surgery   Date of Procedure/Surgery: 7/6/2020   Surgeon: Dr Petros Sanders: Liyah Denton  Primary Physician: Sabina Potter MD       Reason for surgery: deQuervain tendonitis       Latex Allergy: YES, but does have a TAPE allergy  Recent use of: No recent use of aspirin (ASA), NSAIDS or steroids  Tetanus up to date: last tetanus booster within 10 years  Anesthesia Complications: None  History of abnormal bleeding : None  History of Blood Transfusions: no  Health Care Directive or Living Will: no      EKG: n/a  CXR: n/a  Labs: n/a - labs from last year reviewed and all normal      Pre-Operative Risk Assessment Using 2014 ACC/AHA Guidelines     According to the 2014 ACC/AHA pre-operative risk assessment guidelines Danielle Ibarra is at low risk for major cardiac complications during a Low Risk procedure, exercise tolerance is >4 METS  and procedure may proceed as planned. Patient Care Team:  Sabina Potter MD as PCP - General (Internal Medicine)  Angel Sue MD as PCP - [facility]  Lindsay Coto MD (Colon and Rectal Surgery)  Ced Finn MD (Orthopedic Surgery)  Jocelyn Carvajal MD (Obstetrics & Gynecology)  Fredie Shoemaker, MD Talmage Hashimoto., MD (Gastroenterology)       The following sections were reviewed & updated as appropriate: PMH, PSH, FH, and SH. Mental Health Review  Patient is seen for anxiety & depression. She reports these are long term issues but had been well controlled until recently. Started on medications after 1st pregnancy, was able to come off medications, and then had to restart at 7-8th month of recent pregnancy.   She reports since starting medications it has helped. GAD7 and PHQ9 reviewed. Reports experiences the following side effects from the treatment: none. The following sections were reviewed & updated as appropriate: PMH, PL, PSH, FH, and SH. Patient Active Problem List   Diagnosis Code    Ovarian cyst N83.209    History of septic shock Z86.19    Asthma J45.909    Anxiety F41.9    History of rectal cancer Z85.048    History of hypertension Z86.79    Vitamin D deficiency E55.9    Hypertriglyceridemia E78.1    Incisional hernia K43.2    History of prediabetes Z87.898    Obesity, morbid, BMI 40.0-49.9 ([facility] E66.01    Chronic fatigue R53.82    Obstructive sleep apnea syndrome G47.33    Recurrent major depressive disorder ([facility] F33.9          Prior to Admission medications    Medication Sig Start Date End Date Taking? Authorizing Provider   sertraline (ZOLOFT) 50 mg tablet Take 50 mg by mouth daily. Indications: post partum depression   Yes Jasmin Alcaraz MD   omeprazole (PRILOSEC) 20 mg capsule Take 20 mg by mouth daily. 9/11/19  Yes Provider, Historical   PRENATAL /IRON/FOLIC ACID (PRENATAL MULTI PO) Take 1 Tab by mouth daily. Yes Provider, Historical   aspirin 81 mg chewable tablet Take 81 mg by mouth daily. 6/15/20  Provider, Historical   ACCU-CHEK GUIDE strip Check blood glucose once daily 12/30/19 6/15/20  Provider, Historical   doxylamine-pyridoxine, vit B6, (BONJESTA) 20-20 mg TbID Bonjesta 20 mg-20 mg tablet,immediate and delay release   Take 1 tablet twice a day by oral route. 9/18/19 6/15/20  Provider, Historical   insulin NPH (NOVOLIN N NPH U-100 INSULIN) 100 unit/mL injection 35 Units by SubCUTAneous route nightly. 6/15/20  Provider, Historical   psyllium (METAMUCIL) packet Take 1 Packet by mouth daily.   6/15/20  Provider, Historical          Allergies   Allergen Reactions    Adhesive Rash    Trazodone Other (comments)     QT prolongation            Review of Systems   Constitutional: Positive for malaise/fatigue. Negative for weight loss. Respiratory: Negative for cough and shortness of breath. Cardiovascular: Negative for chest pain, palpitations and leg swelling. Gastrointestinal: Negative for abdominal pain, constipation, diarrhea, heartburn, nausea and vomiting. Musculoskeletal: Positive for joint pain. Negative for myalgias. Skin: Negative for rash. Neurological: Negative for dizziness and headaches. Psychiatric/Behavioral: Negative for depression. The patient is not nervous/anxious and does not have insomnia. Objective:   Physical Exam  Constitutional:       General: She is not in acute distress. Appearance: She is well-developed. She is obese. HENT:      Right Ear: Tympanic membrane and ear canal normal.      Left Ear: Tympanic membrane and ear canal normal.      Mouth/Throat:      Mouth: Mucous membranes are moist.      Pharynx: No posterior oropharyngeal erythema. Eyes:      Conjunctiva/sclera: Conjunctivae normal.   Cardiovascular:      Rate and Rhythm: Regular rhythm. Heart sounds: Normal heart sounds. No murmur. Pulmonary:      Effort: Pulmonary effort is normal.      Breath sounds: Normal breath sounds. Abdominal:      General: Bowel sounds are normal.      Palpations: Abdomen is soft. Tenderness: There is no abdominal tenderness. Comments: Multiple abdominal scars    Musculoskeletal:      Right lower leg: No edema. Left lower leg: No edema. Lymphadenopathy:      Cervical: No cervical adenopathy. Neurological:      Cranial Nerves: Cranial nerves are intact. Sensory: Sensation is intact. Motor: Motor function is intact.    Psychiatric:         Mood and Affect: Mood and affect normal.            Visit Vitals  /80   Pulse 84   Temp 98 °F (36.7 °C) (Temporal)   Resp 14   Ht 5' 11.4\" (1.814 m)   Wt 308 lb (139.7 kg)   SpO2 98%   Breastfeeding Unknown   BMI 42.48 kg/m²         Disclaimer:  Advised her to call back or return to office if symptoms worsen/change/persist.  Discussed expected course/resolution/complications of diagnosis in detail with patient. Medication risks/benefits/costs/interactions/alternatives discussed with patient. She was given an after visit summary which includes diagnoses, current medications, & vitals. She expressed understanding with the diagnosis and plan. Aspects of this note may have been generated using voice recognition software. Despite editing, there may be some syntax errors.        Susannah Nuñez MD

## 2020-06-15 NOTE — PATIENT INSTRUCTIONS
List of Mental Health Providers: 
 
Katerina Handy [phone] 
--------------------------------------------- [facility] [address] 
[phone] 
--------------------------------------------- [address] 
--------------------------------------------- [facility] 
[address] [facility], [address] 
--------------------------------------------- [facility] [address] [place] [address], [address] [phone] [place] 
 
[address] 
[phone] 
--------------------------------------------- Geraldo Bateman MD 
[phone] 
--------------------------------------------- [facility] [phone] 
-------------------------------------------- [facility] [address] 
[phone] 
[facility] [employer]

## 2020-07-21 ENCOUNTER — OFFICE VISIT (OUTPATIENT)
Dept: INTERNAL MEDICINE CLINIC | Age: 40
End: 2020-07-21

## 2020-07-21 VITALS
HEART RATE: 71 BPM | BODY MASS INDEX: 41.02 KG/M2 | OXYGEN SATURATION: 97 % | RESPIRATION RATE: 16 BRPM | HEIGHT: 71 IN | DIASTOLIC BLOOD PRESSURE: 80 MMHG | TEMPERATURE: 97.6 F | SYSTOLIC BLOOD PRESSURE: 130 MMHG | WEIGHT: 293 LBS

## 2020-07-21 DIAGNOSIS — M65.4 DE QUERVAIN'S TENOSYNOVITIS, LEFT: ICD-10-CM

## 2020-07-21 DIAGNOSIS — Z01.818 PREOP EXAMINATION: Primary | ICD-10-CM

## 2020-07-21 DIAGNOSIS — M77.12 LATERAL EPICONDYLITIS OF LEFT ELBOW: ICD-10-CM

## 2020-07-21 NOTE — PROGRESS NOTES
Preoperative Evaluation    Date of Exam: 2020    Cherise Benavides is a 47 y.o. female (:1980) who presents for preoperative evaluation. Procedure/Surgery:left de quervains release, lateral epicondyle steroid injection  Date of Procedure/Surgery: 8/10/20  Surgeon: Kira Elder  [facility] Facility: [facility]  Primary Physician: William Romero MD  Latex Allergy: no    Problem List:     Patient Active Problem List    Diagnosis Date Noted    Recurrent major depressive disorder ([facility] 06/15/2020    Dyslexia 06/15/2020    Obstructive sleep apnea syndrome 2017    Chronic fatigue 10/27/2016    History of prediabetes     Obesity, morbid, BMI 40.0-49.9 ([facility]     Incisional hernia 08/10/2015    Vitamin D deficiency 2015    History of hypertension 2015    Ovarian cyst     History of septic shock     Asthma     Anxiety     History of rectal cancer 2014     Medical History:     Past Medical History:   Diagnosis Date   [facility]     Dr. Lorena Ragland Benign essential hypertension 3/24/15    resolved with weight loss and dietary changes    BMI 36.0-36.9,adult     Endometriosis     History of prediabetes     resolved with diet, exercise and wt loss    Hypertriglyceridemia 3/28/15    Hypothyroid     treated with Levothyroxine x3 months then stopped. TFT have been normal since    In vitro fertilization     x2    Miscarriage     x2    Ovarian cyst     QT prolongation     d/t Trazodone; resolved after Trazodone discontinued    Rectal carcinoid tumor 2014    stage IIIB; colonic resection, lymph nodes removed, ileostomy 10/2014 (Dr. Micaela Lezama); ileostomy take-down 2/3/15. No chemo/XRT.  Sees primarily Dr. Maxim Minaya at [facility]. Also sees Dr. Anthony Amos here if needed    Septic [phone]    d/t complications from [facility] , laparoscopy - bowel was lacerated, exploratory surgery    Uterine polyp     Vitamin D deficiency 3/28/15     Allergies: Allergies   Allergen Reactions    Adhesive Rash    Trazodone Other (comments)     QT prolongation      Medications:     Current Outpatient Medications   Medication Sig    sertraline (ZOLOFT) 50 mg tablet Take 50 mg by mouth daily. Indications: post partum depression    omeprazole (PRILOSEC) 20 mg capsule Take 20 mg by mouth daily.  PRENATAL /IRON/FOLIC ACID (PRENATAL MULTI PO) Take 1 Tab by mouth daily. No current facility-administered medications for this visit.       Surgical History:     Past Surgical History:   Procedure Laterality Date    CHEST SURGERY PROCEDURE UNLISTED      HX  SECTION  2020    HX  SECTION  2019    HX COLECTOMY  10/2014    rectosigmoid colon resection, ileostomy (Dr. Delilah Martin)    HX GI  2/3/15    ileostomy take-down (Dr. Delilah Martin)   [facility] [place] HX GYN      laproscopy and D&C - bowel knicked thus had full exploratory    HX GYN      IVF x 2    HX GYN      A2 (miscarriage)    HX HERNIA REPAIR  2005    x 2    HX OTHER SURGICAL  8/10/15    incisional hernia repair with mesh, lysis of adhesions (Dr. Delilah Martin)    HX POLYPECTOMY  2014 (x2)    2 rectal polyps (Dr. Shila Miller, Dr. Kelley Melchor)     Social History:     Social History     Socioeconomic History    Marital status:      Spouse name: Not on file    Number of children: Not on file    Years of education: Not on file    Highest education level: Not on file   Occupational History    Occupation: [employer]   Tobacco Use    Smoking status: Former Smoker     Last attempt to quit: 10/12/2000     Years since quittin.7    Smokeless tobacco: Never Used    Tobacco comment: smoked cigarettes as a teenager - light smoker then   Substance and Sexual Activity    Alcohol use: No     Frequency: Never     Binge frequency: Never    Drug use: No    Sexual activity: Yes     Partners: Male     Birth control/protection: None   Social History Narrative    ** Merged History Encounter **         She is . No children. Works at [employer]. Tetanus up to date: last tetanus booster within 10 years      Anesthesia Complications: None  History of abnormal bleeding : None  History of Blood Transfusions: no  Health Care Directive or Living Will: no    REVIEW OF SYSTEMS:  A comprehensive review of systems was negative except for that written in the HPI.     EXAM:   Visit Vitals  /80 (BP 1 Location: Left arm, BP Patient Position: Sitting)   Pulse 71   Temp 97.6 °F (36.4 °C) (Temporal)   Resp 16   Ht 5' 11.4\" (1.814 m)   Wt 316 lb 8 oz (143.6 kg)   SpO2 97%   BMI 43.65 kg/m²     General appearance - alert, well appearing, and in no distress and obese  Mental status - alert, oriented to person, place, and time  Ears - bilateral TM's and external ear canals normal  Nose - normal and patent, no erythema, discharge or polyps  Mouth - mucous membranes moist, pharynx normal without lesions  Neck - supple, no significant adenopathy  Lymphatics - no palpable lymphadenopathy, no hepatosplenomegaly  Chest - clear to auscultation, no wheezes, rales or rhonchi, symmetric air entry  Heart - normal rate, regular rhythm, normal S1, S2, no murmurs, rubs, clicks or gallops  Abdomen - soft, nontender, nondistended, no masses or organomegaly  scars from previous incisions   Neurological - alert, oriented, normal speech, no focal findings or movement disorder noted  Musculoskeletal - abnormal exam of left ulnar side of wrist radiating into forearm and epicondyle- pain with range of motion or palpation  Extremities - peripheral pulses normal, no pedal edema, no clubbing or cyanosis  Skin - normal coloration and turgor, no rashes, no suspicious skin lesions noted          IMPRESSION:   None  No contraindications to planned surgery    Paul He NP   7/21/2020

## 2020-07-21 NOTE — PROGRESS NOTES
Chief Complaint   Patient presents with   Fredrick Nevarez Pre-op Exam     Left hand 08- Dr Olga Valadez record in preparation for visit and have obtained necessary documentation. Identified pt with two pt identifiers(name and ).       Health Maintenance Due   Topic    Pneumococcal 0-64 years (1 of 1 - PPSV23)         Chief Complaint   Patient presents with    Pre-op Exam     Left hand 08- Dr Brisa Howard        Wt Readings from Last 3 Encounters:   20 316 lb 8 oz (143.6 kg)   06/15/20 308 lb (139.7 kg)   20 315 lb 3.2 oz (143 kg)     Temp Readings from Last 3 Encounters:   20 97.6 °F (36.4 °C) (Temporal)   06/15/20 98 °F (36.7 °C) (Temporal)   20 97.7 °F (36.5 °C) (Oral)     BP Readings from Last 3 Encounters:   20 130/80   06/15/20 128/80   20 126/84     Pulse Readings from Last 3 Encounters:   20 71   06/15/20 84   20 (!) 109           Learning Assessment:  :     Learning Assessment 3/14/2017 2015 12/10/2014   PRIMARY LEARNER Patient Patient Patient   HIGHEST LEVEL OF EDUCATION - PRIMARY LEARNER  - > 4 YEARS OF COLLEGE > 4 YEARS OF COLLEGE   BARRIERS PRIMARY LEARNER - NONE NONE   CO-LEARNER CAREGIVER - No Yes   PRIMARY LANGUAGE ENGLISH ENGLISH ENGLISH    NEED - - No   LEARNER PREFERENCE PRIMARY DEMONSTRATION DEMONSTRATION LISTENING   LEARNING SPECIAL TOPICS - - n/a   ANSWERED BY self Patient patient   RELATIONSHIP SELF SELF SELF   ASSESSMENT COMMENT - - no       Depression Screening:  :     3 most recent PHQ Screens 6/15/2020   Little interest or pleasure in doing things Several days   Feeling down, depressed, irritable, or hopeless Several days   Total Score PHQ 2 2   Trouble falling or staying asleep, or sleeping too much Not at all   Feeling tired or having little energy Nearly every day   Poor appetite, weight loss, or overeating Not at all   Feeling bad about yourself - or that you are a failure or have let yourself or your family down Several days   Trouble concentrating on things such as school, work, reading, or watching TV Not at all   Moving or speaking so slowly that other people could have noticed; or the opposite being so fidgety that others notice Not at all   Thoughts of being better off dead, or hurting yourself in some way Not at all   PHQ 9 Score 6   How difficult have these problems made it for you to do your work, take care of your home and get along with others Not difficult at all       Fall Risk Assessment:  :     No flowsheet data found. Abuse Screening:  :     Abuse Screening Questionnaire 6/15/2020 3/24/2015   Do you ever feel afraid of your partner? N N   Are you in a relationship with someone who physically or mentally threatens you? N N   Is it safe for you to go home? Y Y       Coordination of Care Questionnaire:  :     1) Have you been to an emergency room, urgent care clinic since your last visit? no   Hospitalized since your last visit? no             2) Have you seen or consulted any other health care providers outside of [address] [place] [address] since your last visit? yes  (Include any pap smears or colon screenings in this section.)    3) Do you have an Advance Directive on file? no    4) Are you interested in receiving information on Advance Directives? NO      Patient is accompanied by self I have received verbal consent from Jaymie Portal to discuss any/all medical information while they are present in the room. Reviewed record  In preparation for visit and have obtained necessary documentation.

## 2020-09-18 ENCOUNTER — VIRTUAL VISIT (OUTPATIENT)
Dept: INTERNAL MEDICINE CLINIC | Age: 40
End: 2020-09-18
Payer: COMMERCIAL

## 2020-09-18 DIAGNOSIS — G89.29 CHRONIC ABDOMINAL PAIN: Primary | ICD-10-CM

## 2020-09-18 DIAGNOSIS — F33.1 MODERATE EPISODE OF RECURRENT MAJOR DEPRESSIVE DISORDER (HCC): ICD-10-CM

## 2020-09-18 DIAGNOSIS — D3A.8 NEUROENDOCRINE TUMOR OF ANUS: ICD-10-CM

## 2020-09-18 DIAGNOSIS — E66.01 OBESITY, MORBID, BMI 40.0-49.9 (HCC): ICD-10-CM

## 2020-09-18 DIAGNOSIS — R10.9 CHRONIC ABDOMINAL PAIN: Primary | ICD-10-CM

## 2020-09-18 PROCEDURE — 99214 OFFICE O/P EST MOD 30 MIN: CPT | Performed by: INTERNAL MEDICINE

## 2020-09-18 RX ORDER — DULOXETIN HYDROCHLORIDE 30 MG/1
30 CAPSULE, DELAYED RELEASE ORAL DAILY
Qty: 7 CAP | Refills: 0 | Status: SHIPPED | OUTPATIENT
Start: 2020-09-18 | End: 2020-10-15 | Stop reason: ALTCHOICE

## 2020-09-18 RX ORDER — DULOXETIN HYDROCHLORIDE 60 MG/1
60 CAPSULE, DELAYED RELEASE ORAL DAILY
Qty: 30 CAP | Refills: 5 | Status: SHIPPED | OUTPATIENT
Start: 2020-09-18 | End: 2020-10-15 | Stop reason: SDUPTHER

## 2020-09-18 NOTE — PROGRESS NOTES
1. Have you been to the ER, urgent care clinic since your last visit? Hospitalized since your last visit?no      2. Have you seen or consulted any other health care providers outside of the [address] [place] [address] since your last visit? Include any pap smears or colon screening.  Yes  Chief Complaint   Patient presents with   [address]     Please send link to [phone]

## 2020-09-18 NOTE — PROGRESS NOTES
Scott Flores, who was evaluated through a synchronous (real-time) audio-video encounter, and/or her healthcare decision maker, is aware that it is a billable service, with coverage as determined by her insurance carrier. She provided verbal consent to proceed: Yes, and patient identification was verified. It was conducted pursuant to the emergency declaration under the [address] [place] [address] and the [employer] Act. A caregiver was present when appropriate. Ability to conduct physical exam was limited. I was at home. The patient was at home. Scott Flores is a 47 y.o. female being evaluated by a Virtual Visit (video visit) encounter to address concerns as mentioned above. A caregiver was present when appropriate. Due to this being a TeleHealth encounter (During WAWYQ-89 public health emergency), evaluation of the following organ systems was limited: Vitals/Constitutional/EENT/Resp/CV/GI//MS/Neuro/Skin/Heme-Lymph-Imm. Pursuant to the emergency declaration under the [address] [place] [address] and the [employer] Act, this Virtual Visit was conducted with patient's (and/or legal guardian's) consent, to reduce the risk of exposure to COVID-19 and provide necessary medical care. Services were provided through a video synchronous discussion virtually to substitute for in-person encounter. --Isai Sage MD on 9/24/2020 at 3:22 PM    An electronic signature was used to authenticate this note. HISTORY OF PRESENT ILLNESS  Scott Flores is a 47 y.o. female. HPI  New patient to me. Previously saw Dr. Power Morrow and Dr. Cara Nathan at [facility]. Currently trying to file for disability secondary to her chronic abdominal pain  Secondary to endometriosis and multiple abdomial surgeries. René Arceo of rectal cancer with multiple abdominal surgeries. LAR with ostemy, ostemy repair, 2 hernia repairs with mesh, full exploratory surgery prior to her cancer surgery (had lap for endometriosis - nicked bowel, required full exporatory surgey 3 days later to find and repair). 2 c sections. Chronic pain because of extensive abdominal surgeries. Pain interferes with her ADLs, sitting comfortably, moving around comfortably. Aches over mookie of surgeries geri hernia repair in RLQ. Neuroendocrine tumor with LN seeding and rectal involvement. Had rectal resection and multiple and LN removed. Sees Dr. Mehul Gutierrez once a year. October will be 6 years post surgery. Last year had her chromagranen A level which was elevated, they will repeat this year. Colonoscopy yearly through DR. Mckinney - last was clear so moving to every 3 years. Also seeing Dr. Ashish Hwang - on Omeprazole. Also being treated for arthritis. Just had [employer] release on her left hand, to have release on right. Pain in lower back and knees. 21 month old and 7mo old. Very stressfull. Son (11 month old) recently had high fever and sz. Very stressfull. Finds Zoloft is not helpful anymore. Started for postpartum 2 years ago after birth of first child. Depression and anxiety. Also in the past on Clonazepam but developed an arrhythmia. Still breast feeding. In the past she has seen counseling. Also with endometriosis and ovarian cysts. Seeing Dr. Annamarie Yap. Had IVF x2 - son and natural pregnancy with daughter. Past Medical History:   Diagnosis Date   [facility] Ast[facility]     Dr. Luis Miguel Johnston Benign essential hypertension 3/24/15    resolved with weight loss and dietary changes    BMI 36.0-36.9,adult     Endometriosis     History of prediabetes     resolved with diet, exercise and wt loss    Hypertriglyceridemia 3/28/15    Hypothyroid 2012    treated with Levothyroxine x3 months then stopped.  TFT have been normal since    In vitro fertilization     x2    Miscarriage     x2    Ovarian cyst     QT prolongation     d/t Trazodone; resolved after Trazodone discontinued    Rectal carcinoid tumor 2014    stage IIIB; colonic resection, lymph nodes removed, ileostomy 10/2014 (Dr. Evans James); ileostomy take-down 2/3/15. No chemo/XRT. Se[facility] primarily [facility] Deepika Gregory at [facility]. Also sees Dr. Ronna Kussmaul here if needed    Septic shock(785.52) 1820    d/t complications from [facility] , laparoscopy - bowel was lacerated, exploratory surgery    Uterine polyp     Vitamin D deficiency 3/28/15     Past Surgical History:   Procedure Laterality Date    CHEST SURGERY PROCEDURE UNLISTED      HX  SECTION  2020    HX  SECTION  2019    HX COLECTOMY  10/2014    rectosigmoid colon resection, ileostomy (Dr. Evans James)    HX GI  2/3/15    ileostomy take-down (Dr. Evans James)    HX GYN      laproscopy and D&C - bowel knicked thus had full exploratory    HX GYN      IVF x 2    HX GYN      A2 (miscarriage)    HX HERNIA REPAIR  2005    x 2    HX OTHER SURGICAL  8/10/15    incisional hernia repair with mesh, lysis of adhesions (Dr. Evans James)    HX POLYPECTOMY  2014 (x2)    2 rectal polyps (Dr. Renetta Celaya, Dr. Erwin Narvaez)     Allergies   Allergen Reactions    Adhesive Rash    Trazodone Other (comments)     QT prolongation         Current Outpatient Medications:     omeprazole (PRILOSEC) 20 mg capsule, Take 20 mg by mouth daily. , Disp: , Rfl:     PRENATAL /IRON/FOLIC ACID (PRENATAL MULTI PO), Take 1 Tab by mouth daily. , Disp: , Rfl:     sertraline (ZOLOFT) 50 mg tablet, Take 50 mg by mouth daily.  Indications: post partum depression, Disp: , Rfl:     Family History   Problem Relation Age of Onset    Diabetes Mother     Hypertension Mother     Cancer Mother         uterine    Diabetes Father     Hypertension Father     Hypertension Brother     Attention Deficit Disorder Brother     Diabetes Maternal Aunt     Heart Disease Maternal Aunt     Heart Attack Maternal Aunt     Diabetes Maternal Uncle     Heart Disease Maternal Uncle     Heart Attack Maternal Uncle     Stroke Paternal Grandmother     Colon Cancer Paternal Grandfather     Hypertension Brother     Depression Brother         commited suicide    Cancer Maternal Aunt         colon     Cancer Maternal Aunt         lymphoma    No Known Problems Daughter     No Known Problems Son     Anesth Problems Neg Hx      Social History     Socioeconomic History    Marital status:      Spouse name: Not on file    Number of children: Not on file    Years of education: Not on file    Highest education level: Not on file   Occupational History    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: Not on file    Food insecurity     Worry: Not on file     Inability: Not on file   [employer] needs     Medical: Not on file     Non-medical: Not on file   Tobacco Use    Smoking status: Former Smoker     Last attempt to quit: 10/12/2000     Years since quittin.9    Smokeless tobacco: Never Used    Tobacco comment: smoked cigarettes as a teenager - light smoker then   Substance and Sexual Activity    Alcohol use: No     Frequency: Never     Binge frequency: Never    Drug use: No    Sexual activity: Yes     Partners: Male     Birth control/protection: None   Lifestyle    Physical activity     Days per week: Not on file     Minutes per session: Not on file    Stress: Not on file   Relationships    Social connections     Talks on phone: Not on file     Gets together: Not on file     Attends Anglican service: Not on file     Active member of club or organization: Not on file     Attends meetings of clubs or organizations: Not on file     Relationship status: Not on file    Intimate partner violence     Fear of current or ex partner: Not on file     Emotionally abused: Not on file     Physically abused: Not on file Forced sexual activity: Not on file   Other Topics Concern    Not on file   Social History Narrative    ** Merged History Encounter **         She is . No children. Works at [employer]  See above    Patient-Reported Vitals 9/18/2020   Patient-Reported Weight 310   Patient-Reported Height 511   Patient-Reported LMP no breastfeeding              Physical Exam  Vitals signs reviewed. Constitutional:       Appearance: Normal appearance. HENT:      Head: Normocephalic and atraumatic. Neck:      Comments: nml appearance  Pulmonary:      Effort: Pulmonary effort is normal.      Comments: nml rate  Neurological:      Mental Status: She is alert and oriented to person, place, and time. Psychiatric:         Mood and Affect: Mood normal.         Behavior: Behavior normal.         Thought Content: Thought content normal.         Judgment: Judgment normal.         ASSESSMENT and PLAN  Chronic abdominal pain - encouraged pt to follow up with GI and gyn  Hx neuroendocrine tumor - colonoscopies through Dr. Kalli Santana and f/u oncology  Anxiety/depression - stop zoloft, start Cymbalta, may help with chronic pain as well. Morbid obesity - discussed diet and exercise for weight loss. Orders Placed This Encounter    DULoxetine (CYMBALTA) 30 mg capsule    DULoxetine (CYMBALTA) 60 mg capsule     Follow-up and Dispositions    · Return in about 6 weeks (around 10/30/2020).

## 2020-10-12 RX ORDER — DULOXETIN HYDROCHLORIDE 60 MG/1
60 CAPSULE, DELAYED RELEASE ORAL DAILY
Qty: 30 CAP | Refills: 5 | OUTPATIENT
Start: 2020-10-12

## 2020-10-15 RX ORDER — DULOXETIN HYDROCHLORIDE 60 MG/1
60 CAPSULE, DELAYED RELEASE ORAL DAILY
Qty: 30 CAP | Refills: 5 | Status: SHIPPED | OUTPATIENT
Start: 2020-10-15 | End: 2021-05-10

## 2020-12-07 NOTE — PROGRESS NOTES
HISTORY OF PRESENT ILLNESS  Charmaine Mcduffie is a 47 y.o. female. HPI  Seen today for heart palpitations. Infrequent, tends to notice more at night. Feels like a hard fast pounding in her chest.  Intermittent for the last 3-4 months. Lasts < 1 minute. Then some fluttering. Has happened 3 times. Does not notice during the day. Walking, active with kids without change from baseline. No cp or sob. Hx of vertigo since first pregnancy. Was hospitalized twice when pregnant with 2nd child almost 2 years ago. Seeing an ENT on Friday. Has been treated to BPV in the past.  Now with pressure and feeling like \"someone is blowing in her left ear\". Room spins clockwise, can fall to her side. + nausea. Has intermittent small episodes. Triggered by lying flat on her back. She has started on the Cymbalta. Fees it is working and helping to level her out. Wonders about blood sugar levels. Had gestation DM with both pregnancies requiring insulin. Increased and urination recently. No numbness in feet, no vision changes. Tries to limit sugars but does not limit her carbs. Weight has increased. .     Seeing Tereso Howard in oncology. Chromagen was elevated again (3rd time in a row). Has Pancreostatin level drawn but results are pending. Current Outpatient Medications:     Naproxen Sodium 500 mg TM24, naproxen 500 mg tablet, Disp: , Rfl:     DULoxetine (CYMBALTA) 60 mg capsule, Take 1 Cap by mouth daily. , Disp: 30 Cap, Rfl: 5    omeprazole (PRILOSEC) 20 mg capsule, Take 20 mg by mouth daily. , Disp: , Rfl:     PRENATAL /IRON/FOLIC ACID (PRENATAL MULTI PO), Take 1 Tab by mouth daily. , Disp: , Rfl:     Visit Vitals  /81   Pulse 76   Temp (!) 95.6 °F (35.3 °C) (Temporal)   Resp 20   Ht 5' 11.4\" (1.814 m)   Wt 313 lb (142 kg)   SpO2 97%   BMI 43.17 kg/m²     ROS  See above  Physical Exam  Vitals signs reviewed. Constitutional:       Appearance: Normal appearance.    HENT:      Head: Normocephalic and atraumatic. Neck:      Musculoskeletal: Neck supple. Vascular: No carotid bruit. Comments: No TM  Cardiovascular:      Rate and Rhythm: Normal rate and regular rhythm. Pulses: Normal pulses. Heart sounds: Normal heart sounds. Pulmonary:      Effort: Pulmonary effort is normal.      Breath sounds: Normal breath sounds. Musculoskeletal:      Right lower leg: No edema. Left lower leg: No edema. Lymphadenopathy:      Cervical: No cervical adenopathy. Neurological:      Mental Status: She is alert. EKG - NSR  ASSESSMENT and PLAN  Palpitations - most likely :PVCs. Discussed improving sleep. Decreasing stress and caffeine. Check labs for other causes. Vertigo - f/u with ENT as above  Anxiety/depression - improved changing to Cymbalta. continue same. Gestational DM- increased thirst and urination. Repeat A1C  Neuroendocrine tumor - tumor markers are increasing. F/u with oncology.     Orders Placed This Encounter    METABOLIC PANEL, COMPREHENSIVE    HEMOGLOBIN A1C WITH EAG    TSH 3RD GENERATION    CBC W/O DIFF    AMB POC EKG ROUTINE W/ 12 LEADS, INTER & REP    Naproxen Sodium 500 mg TM24

## 2020-12-08 ENCOUNTER — OFFICE VISIT (OUTPATIENT)
Dept: INTERNAL MEDICINE CLINIC | Age: 40
End: 2020-12-08
Payer: COMMERCIAL

## 2020-12-08 VITALS
WEIGHT: 293 LBS | HEIGHT: 71 IN | HEART RATE: 76 BPM | SYSTOLIC BLOOD PRESSURE: 124 MMHG | DIASTOLIC BLOOD PRESSURE: 81 MMHG | RESPIRATION RATE: 20 BRPM | OXYGEN SATURATION: 97 % | TEMPERATURE: 95.6 F | BODY MASS INDEX: 41.02 KG/M2

## 2020-12-08 DIAGNOSIS — D3A.8 NEUROENDOCRINE TUMOR OF ANUS: ICD-10-CM

## 2020-12-08 DIAGNOSIS — R00.2 PALPITATIONS: Primary | ICD-10-CM

## 2020-12-08 DIAGNOSIS — O24.414 INSULIN CONTROLLED GESTATIONAL DIABETES MELLITUS (GDM) DURING PREGNANCY, ANTEPARTUM: ICD-10-CM

## 2020-12-08 DIAGNOSIS — F33.1 MODERATE EPISODE OF RECURRENT MAJOR DEPRESSIVE DISORDER (HCC): ICD-10-CM

## 2020-12-08 DIAGNOSIS — F41.9 ANXIETY: ICD-10-CM

## 2020-12-08 DIAGNOSIS — H81.10 BENIGN PAROXYSMAL POSITIONAL VERTIGO, UNSPECIFIED LATERALITY: ICD-10-CM

## 2020-12-08 LAB
ERYTHROCYTE [DISTWIDTH] IN BLOOD BY AUTOMATED COUNT: 13 % (ref 11.5–14.5)
HCT VFR BLD AUTO: 42.4 % (ref 35–47)
HGB BLD-MCNC: 13.7 G/DL (ref 11.5–16)
MCH RBC QN AUTO: 30 PG (ref 26–34)
MCHC RBC AUTO-ENTMCNC: 32.3 G/DL (ref 30–36.5)
MCV RBC AUTO: 93 FL (ref 80–99)
NRBC # BLD: 0 K/UL (ref 0–0.01)
NRBC BLD-RTO: 0 PER 100 WBC
PLATELET # BLD AUTO: 288 K/UL (ref 150–400)
PMV BLD AUTO: 10.7 FL (ref 8.9–12.9)
RBC # BLD AUTO: 4.56 M/UL (ref 3.8–5.2)
WBC # BLD AUTO: 10.4 K/UL (ref 3.6–11)

## 2020-12-08 PROCEDURE — 99214 OFFICE O/P EST MOD 30 MIN: CPT | Performed by: INTERNAL MEDICINE

## 2020-12-08 PROCEDURE — 93000 ELECTROCARDIOGRAM COMPLETE: CPT | Performed by: INTERNAL MEDICINE

## 2020-12-08 RX ORDER — NAPROXEN SODIUM 500 MG/1
TABLET, FILM COATED, EXTENDED RELEASE ORAL
COMMUNITY
Start: 2020-08-21 | End: 2022-02-10

## 2020-12-08 NOTE — LETTER
12/8/2020 3:04 PM 
 
Ms. [address] [place]  [place] [id number] To whom it may concern: Ms. Desir Shoulder was seen today in our office for a problem based visit. Sincerely, Raghav Tsang MD

## 2020-12-09 LAB
ALBUMIN SERPL-MCNC: 4.2 G/DL (ref 3.5–5)
ALBUMIN/GLOB SERPL: 1.4 {RATIO} (ref 1.1–2.2)
ALP SERPL-CCNC: 102 U/L (ref 45–117)
ALT SERPL-CCNC: 50 U/L (ref 12–78)
ANION GAP SERPL CALC-SCNC: 6 MMOL/L (ref 5–15)
AST SERPL-CCNC: 20 U/L (ref 15–37)
BILIRUB SERPL-MCNC: 0.4 MG/DL (ref 0.2–1)
BUN SERPL-MCNC: 22 MG/DL (ref 6–20)
BUN/CREAT SERPL: 30 (ref 12–20)
CALCIUM SERPL-MCNC: 8.8 MG/DL (ref 8.5–10.1)
CHLORIDE SERPL-SCNC: 105 MMOL/L (ref 97–108)
CO2 SERPL-SCNC: 30 MMOL/L (ref 21–32)
CREAT SERPL-MCNC: 0.74 MG/DL (ref 0.55–1.02)
EST. AVERAGE GLUCOSE BLD GHB EST-MCNC: 100 MG/DL
GLOBULIN SER CALC-MCNC: 3 G/DL (ref 2–4)
GLUCOSE SERPL-MCNC: 105 MG/DL (ref 65–100)
HBA1C MFR BLD: 5.1 % (ref 4–5.6)
POTASSIUM SERPL-SCNC: 4.1 MMOL/L (ref 3.5–5.1)
PROT SERPL-MCNC: 7.2 G/DL (ref 6.4–8.2)
SODIUM SERPL-SCNC: 141 MMOL/L (ref 136–145)
TSH SERPL DL<=0.05 MIU/L-ACNC: 1.74 UIU/ML (ref 0.36–3.74)

## 2020-12-17 ENCOUNTER — TRANSCRIBE ORDER (OUTPATIENT)
Dept: SCHEDULING | Age: 40
End: 2020-12-17

## 2020-12-17 DIAGNOSIS — H81.13 BENIGN PAROXYSMAL VERTIGO OF BOTH EARS: Primary | ICD-10-CM

## 2021-01-26 ENCOUNTER — HOSPITAL ENCOUNTER (OUTPATIENT)
Dept: MRI IMAGING | Age: 41
Discharge: HOME | End: 2021-01-26
Attending: SPECIALIST
Payer: COMMERCIAL

## 2021-01-26 VITALS — WEIGHT: 208 LBS | BODY MASS INDEX: 28.69 KG/M2

## 2021-01-26 DIAGNOSIS — H81.13 BENIGN PAROXYSMAL VERTIGO OF BOTH EARS: ICD-10-CM

## 2021-01-26 PROCEDURE — 74011250636 HC RX REV CODE- 250/636: Performed by: RADIOLOGY

## 2021-01-26 PROCEDURE — A9575 INJ GADOTERATE MEGLUMI 0.1ML: HCPCS | Performed by: RADIOLOGY

## 2021-01-26 PROCEDURE — 70553 MRI BRAIN STEM W/O & W/DYE: CPT

## 2021-01-26 RX ORDER — GADOTERATE MEGLUMINE 376.9 MG/ML
20 INJECTION INTRAVENOUS
Status: COMPLETED | OUTPATIENT
Start: 2021-01-26 | End: 2021-01-26

## 2021-01-26 RX ADMIN — GADOTERATE MEGLUMINE 20 ML: 376.9 INJECTION INTRAVENOUS at 15:44

## 2021-03-08 ENCOUNTER — TRANSCRIBE ORDER (OUTPATIENT)
Dept: SCHEDULING | Age: 41
End: 2021-03-08

## 2021-03-08 DIAGNOSIS — Z85.048 HISTORY OF RECTAL CANCER: Primary | ICD-10-CM

## 2021-03-26 ENCOUNTER — HOSPITAL ENCOUNTER (OUTPATIENT)
Dept: CT IMAGING | Age: 41
Discharge: HOME | End: 2021-03-26
Attending: COLON & RECTAL SURGERY
Payer: COMMERCIAL

## 2021-03-26 DIAGNOSIS — Z85.048 HISTORY OF RECTAL CANCER: ICD-10-CM

## 2021-03-26 PROCEDURE — 74011000636 HC RX REV CODE- 636: Performed by: RADIOLOGY

## 2021-03-26 PROCEDURE — 74177 CT ABD & PELVIS W/CONTRAST: CPT

## 2021-03-26 RX ADMIN — IOPAMIDOL 100 ML: 755 INJECTION, SOLUTION INTRAVENOUS at 14:27

## 2021-05-24 ENCOUNTER — TRANSCRIBE ORDER (OUTPATIENT)
Dept: SCHEDULING | Age: 41
End: 2021-05-24

## 2021-05-24 DIAGNOSIS — D3A.8 NEUROENDOCRINE TUMOR: Primary | ICD-10-CM

## 2021-06-11 ENCOUNTER — HOSPITAL ENCOUNTER (OUTPATIENT)
Dept: CT IMAGING | Age: 41
Discharge: HOME | End: 2021-06-11
Payer: COMMERCIAL

## 2021-06-11 DIAGNOSIS — D3A.8 NEUROENDOCRINE TUMOR: ICD-10-CM

## 2021-06-11 PROCEDURE — 74011000636 HC RX REV CODE- 636: Performed by: RADIOLOGY

## 2021-06-11 PROCEDURE — 74177 CT ABD & PELVIS W/CONTRAST: CPT

## 2021-06-11 RX ADMIN — IOPAMIDOL 100 ML: 755 INJECTION, SOLUTION INTRAVENOUS at 07:49

## 2021-12-18 ENCOUNTER — HOSPITAL ENCOUNTER (EMERGENCY)
Age: 41
Discharge: HOME | End: 2021-12-18
Attending: EMERGENCY MEDICINE
Payer: COMMERCIAL

## 2021-12-18 VITALS
HEART RATE: 94 BPM | SYSTOLIC BLOOD PRESSURE: 126 MMHG | OXYGEN SATURATION: 100 % | HEIGHT: 71 IN | BODY MASS INDEX: 29.12 KG/M2 | RESPIRATION RATE: 18 BRPM | TEMPERATURE: 97.6 F | WEIGHT: 208 LBS | DIASTOLIC BLOOD PRESSURE: 81 MMHG

## 2021-12-18 DIAGNOSIS — R11.10 VOMITING AND DIARRHEA: Primary | ICD-10-CM

## 2021-12-18 DIAGNOSIS — R19.7 VOMITING AND DIARRHEA: Primary | ICD-10-CM

## 2021-12-18 LAB
ALBUMIN SERPL-MCNC: 3.6 G/DL (ref 3.5–5)
ALBUMIN/GLOB SERPL: 0.9 {RATIO} (ref 1.1–2.2)
ALP SERPL-CCNC: 75 U/L (ref 45–117)
ALT SERPL-CCNC: 31 U/L (ref 12–78)
ANION GAP SERPL CALC-SCNC: 6 MMOL/L (ref 5–15)
APPEARANCE UR: ABNORMAL
AST SERPL-CCNC: 22 U/L (ref 15–37)
BACTERIA URNS QL MICRO: ABNORMAL /HPF
BASOPHILS # BLD: 0 K/UL (ref 0–0.1)
BASOPHILS NFR BLD: 1 % (ref 0–1)
BILIRUB SERPL-MCNC: 0.8 MG/DL (ref 0.2–1)
BILIRUB UR QL CFM: NEGATIVE
BUN SERPL-MCNC: 13 MG/DL (ref 6–20)
BUN/CREAT SERPL: 16 (ref 12–20)
CALCIUM SERPL-MCNC: 8.9 MG/DL (ref 8.5–10.1)
CHLORIDE SERPL-SCNC: 105 MMOL/L (ref 97–108)
CO2 SERPL-SCNC: 26 MMOL/L (ref 21–32)
COLOR UR: ABNORMAL
CREAT SERPL-MCNC: 0.81 MG/DL (ref 0.55–1.02)
DIFFERENTIAL METHOD BLD: ABNORMAL
EOSINOPHIL # BLD: 0 K/UL (ref 0–0.4)
EOSINOPHIL NFR BLD: 1 % (ref 0–7)
EPITH CASTS URNS QL MICRO: ABNORMAL /LPF
ERYTHROCYTE [DISTWIDTH] IN BLOOD BY AUTOMATED COUNT: 14.1 % (ref 11.5–14.5)
GLOBULIN SER CALC-MCNC: 4 G/DL (ref 2–4)
GLUCOSE SERPL-MCNC: 97 MG/DL (ref 65–100)
GLUCOSE UR STRIP.AUTO-MCNC: NEGATIVE MG/DL
HCT VFR BLD AUTO: 46.9 % (ref 35–47)
HGB BLD-MCNC: 15.2 G/DL (ref 11.5–16)
HGB UR QL STRIP: ABNORMAL
IMM GRANULOCYTES # BLD AUTO: 0 K/UL (ref 0–0.04)
IMM GRANULOCYTES NFR BLD AUTO: 0 % (ref 0–0.5)
KETONES UR QL STRIP.AUTO: ABNORMAL MG/DL
LEUKOCYTE ESTERASE UR QL STRIP.AUTO: ABNORMAL
LIPASE SERPL-CCNC: 90 U/L (ref 73–393)
LYMPHOCYTES # BLD: 1.2 K/UL (ref 0.8–3.5)
LYMPHOCYTES NFR BLD: 22 % (ref 12–49)
MAGNESIUM SERPL-MCNC: 2 MG/DL (ref 1.6–2.4)
MCH RBC QN AUTO: 27.9 PG (ref 26–34)
MCHC RBC AUTO-ENTMCNC: 32.4 G/DL (ref 30–36.5)
MCV RBC AUTO: 86.2 FL (ref 80–99)
MONOCYTES # BLD: 0.6 K/UL (ref 0–1)
MONOCYTES NFR BLD: 11 % (ref 5–13)
MUCOUS THREADS URNS QL MICRO: ABNORMAL /LPF
NEUTS SEG # BLD: 3.6 K/UL (ref 1.8–8)
NEUTS SEG NFR BLD: 65 % (ref 32–75)
NITRITE UR QL STRIP.AUTO: NEGATIVE
NRBC # BLD: 0 K/UL (ref 0–0.01)
NRBC BLD-RTO: 0 PER 100 WBC
PH UR STRIP: 6 [PH] (ref 5–8)
PLATELET # BLD AUTO: 243 K/UL (ref 150–400)
PMV BLD AUTO: 10.4 FL (ref 8.9–12.9)
POTASSIUM SERPL-SCNC: 3.7 MMOL/L (ref 3.5–5.1)
PROT SERPL-MCNC: 7.6 G/DL (ref 6.4–8.2)
PROT UR STRIP-MCNC: 100 MG/DL
RBC # BLD AUTO: 5.44 M/UL (ref 3.8–5.2)
RBC #/AREA URNS HPF: ABNORMAL /HPF (ref 0–5)
SODIUM SERPL-SCNC: 137 MMOL/L (ref 136–145)
SP GR UR REFRACTOMETRY: 1.03 (ref 1–1.03)
UROBILINOGEN UR QL STRIP.AUTO: 1 EU/DL (ref 0.2–1)
WBC # BLD AUTO: 5.5 K/UL (ref 3.6–11)
WBC URNS QL MICRO: ABNORMAL /HPF (ref 0–4)

## 2021-12-18 PROCEDURE — 85025 COMPLETE CBC W/AUTO DIFF WBC: CPT

## 2021-12-18 PROCEDURE — 96361 HYDRATE IV INFUSION ADD-ON: CPT

## 2021-12-18 PROCEDURE — 83735 ASSAY OF MAGNESIUM: CPT

## 2021-12-18 PROCEDURE — 83690 ASSAY OF LIPASE: CPT

## 2021-12-18 PROCEDURE — 87209 SMEAR COMPLEX STAIN: CPT

## 2021-12-18 PROCEDURE — 96374 THER/PROPH/DIAG INJ IV PUSH: CPT

## 2021-12-18 PROCEDURE — 80053 COMPREHEN METABOLIC PANEL: CPT

## 2021-12-18 PROCEDURE — 36415 COLL VENOUS BLD VENIPUNCTURE: CPT

## 2021-12-18 PROCEDURE — 81001 URINALYSIS AUTO W/SCOPE: CPT

## 2021-12-18 PROCEDURE — 99283 EMERGENCY DEPT VISIT LOW MDM: CPT

## 2021-12-18 PROCEDURE — 74011250636 HC RX REV CODE- 250/636: Performed by: EMERGENCY MEDICINE

## 2021-12-18 RX ORDER — ONDANSETRON 2 MG/ML
4 INJECTION INTRAMUSCULAR; INTRAVENOUS
Status: COMPLETED | OUTPATIENT
Start: 2021-12-18 | End: 2021-12-18

## 2021-12-18 RX ORDER — LOPERAMIDE HYDROCHLORIDE 2 MG/1
2 CAPSULE ORAL
Qty: 20 CAPSULE | Refills: 0 | Status: SHIPPED | OUTPATIENT
Start: 2021-12-18 | End: 2021-12-28

## 2021-12-18 RX ORDER — PROMETHAZINE HYDROCHLORIDE 25 MG/1
25 SUPPOSITORY RECTAL
Qty: 12 SUPPOSITORY | Refills: 0 | Status: SHIPPED | OUTPATIENT
Start: 2021-12-18 | End: 2021-12-25

## 2021-12-18 RX ADMIN — SODIUM CHLORIDE 1000 ML: 9 INJECTION, SOLUTION INTRAVENOUS at 13:22

## 2021-12-18 RX ADMIN — ONDANSETRON 4 MG: 2 INJECTION INTRAMUSCULAR; INTRAVENOUS at 13:21

## 2021-12-18 NOTE — ED PROVIDER NOTES
3 days of stomach virus. 3 days of vomiting and diarrhea. Seen at [facility] yesterday with pulse of 160. Given IVF and zofran. STill vomiting even with zofran at home. + fever 3 days ago but none since. COVID and FLU test negative.  had similar symptoms but he has improved. No cough or sob, dysuria. Past Medical History:   Diagnosis Date   [facility] Asthma     Dr. Yesica Buckner Benign essential hypertension 3/24/15    resolved with weight loss and dietary changes    BMI 36.0-36.9,adult     Endometriosis     History of prediabetes     resolved with diet, exercise and wt loss    Hypertriglyceridemia 3/28/15    Hypothyroid     treated with Levothyroxine x3 months then stopped. TFT have been normal since    In vitro fertilization     x2    Miscarriage     x2    Ovarian cyst     QT prolongation     d/t Trazodone; resolved after Trazodone discontinued    Rectal carcinoid tumor 2014    stage IIIB; colonic resection, lymph nodes removed, ileostomy 10/2014 (Dr. Dylan Osborn); ileostomy take-down 2/3/15. No chemo/XRT.  Sees primarily Dr. Renaldo Ovalles at [facility]. Also sees Dr. Zohra Kemp here if needed    Septic shock(785.52) 3386    d/t complications from [facility] , laparoscopy - bowel was lacerated, exploratory surgery    Uterine polyp     Vitamin D deficiency 3/28/15       Past Surgical History:   Procedure Laterality Date    HX  SECTION  2020    HX  SECTION  2019    HX GI  2/3/15    ileostomy take-down (Dr. Dylan Osborn)    HX GYN      laproscopy and D&C - bowel knicked thus had full exploratory    HX GYN      IVF x 2    HX GYN      A2 (miscarriage)    HX HERNIA REPAIR  2005    x 2    HX OTHER SURGICAL  8/10/15    incisional hernia repair with mesh, lysis of adhesions (Dr. Dylan Osborn)    HX POLYPECTOMY  2014 (x2)    2 rectal polyps (Dr. Francisca Forte, Dr. Júnoir Cleveland)    HX TOTAL COLECTOMY  10/2014    rectosigmoid colon resection, ileostomy ( Faye)    CO CHEST SURGERY PROCEDURE UNLISTED           Family History:   Problem Relation Age of Onset    Diabetes Mother     Hypertension Mother     Cancer Mother         uterine    Diabetes Father     Hypertension Father     Hypertension Brother     Attention Deficit Disorder Brother     Diabetes Maternal Aunt     Heart Disease Maternal Aunt     Heart Attack Maternal Aunt     Diabetes Maternal Uncle     Heart Disease Maternal Uncle     Heart Attack Maternal Uncle     Stroke Paternal [de-identified]     Colon Cancer Paternal Grandfather     Hypertension Brother     Depression Brother         commited suicide    Cancer Maternal Aunt         colon     Cancer Maternal Aunt         lymphoma    No Known Problems Daughter     No Known Problems Son     Anesth Problems Neg Hx        Social History     Socioeconomic History    Marital status:      Spouse name: Not on file    Number of children: Not on file    Years of education: Not on file    Highest education level: Not on file   Occupational History    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 No children. Works at [employer]. Social Determinants of Health     Financial Resource Strain:     Difficulty of Paying Living Expenses: Not on file   Food Insecurity:     Worried About Running Out of Food in the Last Year: Not on file    Dmitry of Food in the Last Year: Not on file   Transportation Needs:     Lack of Transportation (Medical):  Not on file    Lack of Transportation (Non-Medical): Not on file   Physical Activity:     Days of Exercise per Week: Not on file    Minutes of Exercise per Session: Not on file   Stress:     Feeling of Stress : Not on file   Social Connections:     Frequency of Communication with Friends and Family: Not on file    Frequency of Social Gatherings with Friends and Family: Not on file    Attends Baptist Services: Not on file    Active Member of [address] [place] [address] or Organizations: Not on file    Attends Club or Organization Meetings: Not on file    Marital Status: Not on file   Intimate Partner Violence:     Fear of Current or Ex-Partner: Not on file    Emotionally Abused: Not on file    Physically Abused: Not on file    Sexually Abused: Not on file   Housing Stability:     Unable to Pay for Housing in the Last Year: Not on file    Number of [place] in the Last Year: Not on file    Unstable Housing in the Last Year: Not on file         ALLERGIES: Adhesive and Trazodone    Review of Systems   Constitutional: Negative for fever. HENT: Negative for facial swelling. Eyes: Negative for visual disturbance. Respiratory: Negative for chest tightness. Cardiovascular: Negative for chest pain. Gastrointestinal: Positive for diarrhea and vomiting. Negative for abdominal pain. Genitourinary: Negative for difficulty urinating and dysuria. Musculoskeletal: Negative for arthralgias. Skin: Negative for rash. Neurological: Negative for headaches. Hematological: Negative for adenopathy. Psychiatric/Behavioral: Negative for suicidal ideas. There were no vitals filed for this visit. Physical Exam  Vitals and nursing note reviewed. Constitutional:       General: She is not in acute distress. Appearance: She is well-developed. HENT:      Head: Normocephalic and atraumatic. Eyes:      General: No scleral icterus.      Conjunctiva/sclera: Conjunctivae normal.      Pupils: Pupils are equal, round, and reactive to light. Cardiovascular:      Rate and Rhythm: Tachycardia present. Heart sounds: No murmur heard. Pulmonary:      Effort: Pulmonary effort is normal. No respiratory distress. Abdominal:      General: There is no distension. Musculoskeletal:         General: Normal range of motion. Cervical back: Normal range of motion and neck supple. Skin:     General: Skin is warm and dry. Findings: No rash. Neurological:      Mental Status: She is alert and oriented to person, place, and time. MDM  Number of Diagnoses or Management Options  Vomiting and diarrhea  Diagnosis management comments: Assessment: Patient resting comfortably at this time. Vital signs improved. No vomiting in the ED. I suspect her symptoms are from a stomach virus or gastroenteritis. Blood work was unremarkable. Patient also adds that her son is now developing similar symptoms. She can be discharged home with antinausea medication. Continue oral hydration. Return to the ED she has worsening symptoms.        Amount and/or Complexity of Data Reviewed  Clinical lab tests: reviewed           Procedures

## 2021-12-18 NOTE — ED TRIAGE NOTES
Patient reports fever, diarrhea with vomiting x 3 days. Reports Covid and Flu negative. Reports her  had the same symptoms. Denies cough, pain, CP, SOB.

## 2021-12-22 ENCOUNTER — OFFICE VISIT (OUTPATIENT)
Dept: INTERNAL MEDICINE CLINIC | Age: 41
End: 2021-12-22
Payer: COMMERCIAL

## 2021-12-22 VITALS
DIASTOLIC BLOOD PRESSURE: 75 MMHG | RESPIRATION RATE: 16 BRPM | BODY MASS INDEX: 39.42 KG/M2 | SYSTOLIC BLOOD PRESSURE: 118 MMHG | OXYGEN SATURATION: 99 % | HEIGHT: 71 IN | TEMPERATURE: 98.3 F | WEIGHT: 281.6 LBS | HEART RATE: 44 BPM

## 2021-12-22 DIAGNOSIS — F41.9 ANXIETY: Primary | ICD-10-CM

## 2021-12-22 DIAGNOSIS — F33.1 MODERATE EPISODE OF RECURRENT MAJOR DEPRESSIVE DISORDER (HCC): ICD-10-CM

## 2021-12-22 PROCEDURE — 99213 OFFICE O/P EST LOW 20 MIN: CPT | Performed by: INTERNAL MEDICINE

## 2021-12-22 NOTE — PROGRESS NOTES
1. Have you been to the ER, urgent care clinic since your last visit? Hospitalized since your last visit? No    2. Have you seen or consulted any other health care providers outside of the [address] [place] [address] since your last visit? Include any pap smears or colon screening.  No   Chief Complaint   Patient presents with   [facility] Medication Evaluation

## 2021-12-22 NOTE — PROGRESS NOTES
Valeria Britt is a 46 y.o. female  Chief Complaint   Patient presents with    Labs    Medication Evaluation     Visit Vitals  /75   Pulse (!) 44   Temp 98.3 °F (36.8 °C) (Temporal)   Resp 16   Ht 5' 11\" (1.803 m)   Wt 281 lb 9.6 oz (127.7 kg)   SpO2 99%   BMI 39.28 kg/m²      Health Maintenance Due   Topic Date Due    Hepatitis C Screening  Never done    COVID-19 Vaccine (2 - Pfizer 3-dose series) 09/12/2021       HPI  Ms. Prado has a reported history of bipolar disorder, Neuroendocrine tumor, Depression/anxiety who presents to clinic to ask if CBD oil/natural supplement will help with her anxiety/insomnia. Patient has a son with special need and other social factors that can worsen need for anxiety treatment. She reports that she was on xanax and trazodone which was stopped due to side effects of arrhythmia/QT prolongation, she was on SSRIs and Seroquel for Bipolar disorder, PTSD and Anxiety/Depression which she didn't help. Unfortunately, she cannot book appointment with psychiatrist and hence she wants to see PCP. She reports a family history of Suicide, She denies SI/HI. Javier Billing Past Medical History:   Diagnosis Date   [place] Asthma     Dr. Jack Nissen Benign essential hypertension 3/24/15    resolved with weight loss and dietary changes    BMI 36.0-36.9,adult     Endometriosis     History of prediabetes     resolved with diet, exercise and wt loss    Hypertriglyceridemia 3/28/15    Hypothyroid 2012    treated with Levothyroxine x3 months then stopped. TFT have been normal since    In vitro fertilization     x2    Miscarriage     x2    Ovarian cyst     QT prolongation     d/t Trazodone; resolved after Trazodone discontinued    Rectal carcinoid tumor 08/2014    stage IIIB; colonic resection, lymph nodes removed, ileostomy 10/2014 (Dr. Maria E Mendez); ileostomy take-down 2/3/15. No chemo/XRT.  Sees primarily Dr. Mitzi Hendrix at [facility]. Also sees Dr. Trent Katz here if needed    Septic shock(025.40) 1298    d/t complications from [facility] , laparoscopy - bowel was lacerated, exploratory surgery    Uterine polyp     Vitamin D deficiency 3/28/15      Allergies   Allergen Reactions    Adhesive Rash    Trazodone Other (comments)     QT prolongation          ROS  Review of Systems   All other systems reviewed and are negative. EXAM  Physical Exam  Constitutional:       General: She is not in acute distress. Appearance: Normal appearance. HENT:      Head: Normocephalic and atraumatic. Cardiovascular:      Rate and Rhythm: Normal rate and regular rhythm. Pulmonary:      Effort: No respiratory distress. Breath sounds: Normal breath sounds. Neurological:      Mental Status: She is alert. Psychiatric:         Mood and Affect: Mood normal.         ASSESSMENT/PLAN  Encounter Diagnoses     ICD-10-CM ICD-9-CM   1. Anxiety  F41.9 300.00   2. Moderate episode of recurrent major depressive disorder ([facility] [address]  F33.1 296.32      -questions answered, acknowledged there may be reports of benefit from CBD but I would not recommend it based on approval and evidence.    -cognitive behavioral therapy and scheduling appointment with psychiatry recommended.   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

## 2021-12-28 LAB
O+P SPEC MICRO: NORMAL
O+P STL CONC: NORMAL
SPECIMEN SOURCE: NORMAL

## 2022-02-04 ENCOUNTER — APPOINTMENT (OUTPATIENT)
Dept: ULTRASOUND IMAGING | Age: 42
End: 2022-02-04
Attending: EMERGENCY MEDICINE
Payer: COMMERCIAL

## 2022-02-04 ENCOUNTER — APPOINTMENT (OUTPATIENT)
Dept: CT IMAGING | Age: 42
End: 2022-02-04
Attending: EMERGENCY MEDICINE
Payer: COMMERCIAL

## 2022-02-04 ENCOUNTER — HOSPITAL ENCOUNTER (EMERGENCY)
Age: 42
Discharge: HOME | End: 2022-02-04
Attending: EMERGENCY MEDICINE
Payer: COMMERCIAL

## 2022-02-04 VITALS
HEIGHT: 67 IN | TEMPERATURE: 98.1 F | OXYGEN SATURATION: 96 % | WEIGHT: 281 LBS | HEART RATE: 67 BPM | SYSTOLIC BLOOD PRESSURE: 137 MMHG | DIASTOLIC BLOOD PRESSURE: 74 MMHG | RESPIRATION RATE: 11 BRPM | BODY MASS INDEX: 44.1 KG/M2

## 2022-02-04 DIAGNOSIS — N20.1 URETERAL STONE: Primary | ICD-10-CM

## 2022-02-04 LAB
ALBUMIN SERPL-MCNC: 3.3 G/DL (ref 3.5–5)
ALBUMIN/GLOB SERPL: 0.9 {RATIO} (ref 1.1–2.2)
ALP SERPL-CCNC: 69 U/L (ref 45–117)
ALT SERPL-CCNC: 24 U/L (ref 12–78)
ANION GAP SERPL CALC-SCNC: 5 MMOL/L (ref 5–15)
APPEARANCE UR: ABNORMAL
AST SERPL-CCNC: 19 U/L (ref 15–37)
BACTERIA URNS QL MICRO: NEGATIVE /HPF
BASOPHILS # BLD: 0 K/UL (ref 0–0.1)
BASOPHILS NFR BLD: 0 % (ref 0–1)
BILIRUB SERPL-MCNC: 0.8 MG/DL (ref 0.2–1)
BILIRUB UR QL: NEGATIVE
BUN SERPL-MCNC: 16 MG/DL (ref 6–20)
BUN/CREAT SERPL: 19 (ref 12–20)
CALCIUM SERPL-MCNC: 8.7 MG/DL (ref 8.5–10.1)
CHLORIDE SERPL-SCNC: 109 MMOL/L (ref 97–108)
CO2 SERPL-SCNC: 25 MMOL/L (ref 21–32)
COLOR UR: ABNORMAL
COMMENT, HOLDF: NORMAL
CREAT SERPL-MCNC: 0.84 MG/DL (ref 0.55–1.02)
DIFFERENTIAL METHOD BLD: ABNORMAL
EOSINOPHIL # BLD: 0.1 K/UL (ref 0–0.4)
EOSINOPHIL NFR BLD: 1 % (ref 0–7)
EPITH CASTS URNS QL MICRO: ABNORMAL /LPF
ERYTHROCYTE [DISTWIDTH] IN BLOOD BY AUTOMATED COUNT: 14.6 % (ref 11.5–14.5)
GLOBULIN SER CALC-MCNC: 3.7 G/DL (ref 2–4)
GLUCOSE SERPL-MCNC: 92 MG/DL (ref 65–100)
GLUCOSE UR STRIP.AUTO-MCNC: NEGATIVE MG/DL
HCT VFR BLD AUTO: 38.9 % (ref 35–47)
HGB BLD-MCNC: 12.6 G/DL (ref 11.5–16)
HGB UR QL STRIP: ABNORMAL
IMM GRANULOCYTES # BLD AUTO: 0 K/UL (ref 0–0.04)
IMM GRANULOCYTES NFR BLD AUTO: 0 % (ref 0–0.5)
KETONES UR QL STRIP.AUTO: 40 MG/DL
LEUKOCYTE ESTERASE UR QL STRIP.AUTO: NEGATIVE
LIPASE SERPL-CCNC: 90 U/L (ref 73–393)
LYMPHOCYTES # BLD: 1.8 K/UL (ref 0.8–3.5)
LYMPHOCYTES NFR BLD: 19 % (ref 12–49)
MCH RBC QN AUTO: 28.1 PG (ref 26–34)
MCHC RBC AUTO-ENTMCNC: 32.4 G/DL (ref 30–36.5)
MCV RBC AUTO: 86.8 FL (ref 80–99)
MONOCYTES # BLD: 0.7 K/UL (ref 0–1)
MONOCYTES NFR BLD: 8 % (ref 5–13)
NEUTS SEG # BLD: 6.9 K/UL (ref 1.8–8)
NEUTS SEG NFR BLD: 72 % (ref 32–75)
NITRITE UR QL STRIP.AUTO: NEGATIVE
NRBC # BLD: 0 K/UL (ref 0–0.01)
NRBC BLD-RTO: 0 PER 100 WBC
PH UR STRIP: 8 [PH] (ref 5–8)
PLATELET # BLD AUTO: 210 K/UL (ref 150–400)
PMV BLD AUTO: 11.5 FL (ref 8.9–12.9)
POTASSIUM SERPL-SCNC: 3.7 MMOL/L (ref 3.5–5.1)
PROT SERPL-MCNC: 7 G/DL (ref 6.4–8.2)
PROT UR STRIP-MCNC: 30 MG/DL
RBC # BLD AUTO: 4.48 M/UL (ref 3.8–5.2)
RBC #/AREA URNS HPF: >100 /HPF (ref 0–5)
SAMPLES BEING HELD,HOLD: NORMAL
SODIUM SERPL-SCNC: 139 MMOL/L (ref 136–145)
SP GR UR REFRACTOMETRY: 1.01 (ref 1–1.03)
UR CULT HOLD, URHOLD: NORMAL
UROBILINOGEN UR QL STRIP.AUTO: 1 EU/DL (ref 0.2–1)
WBC # BLD AUTO: 9.5 K/UL (ref 3.6–11)
WBC URNS QL MICRO: ABNORMAL /HPF (ref 0–4)

## 2022-02-04 PROCEDURE — 96374 THER/PROPH/DIAG INJ IV PUSH: CPT

## 2022-02-04 PROCEDURE — 80053 COMPREHEN METABOLIC PANEL: CPT

## 2022-02-04 PROCEDURE — 99284 EMERGENCY DEPT VISIT MOD MDM: CPT

## 2022-02-04 PROCEDURE — 85025 COMPLETE CBC W/AUTO DIFF WBC: CPT

## 2022-02-04 PROCEDURE — 74011000636 HC RX REV CODE- 636: Performed by: STUDENT IN AN ORGANIZED HEALTH CARE EDUCATION/TRAINING PROGRAM

## 2022-02-04 PROCEDURE — 96375 TX/PRO/DX INJ NEW DRUG ADDON: CPT

## 2022-02-04 PROCEDURE — 36415 COLL VENOUS BLD VENIPUNCTURE: CPT

## 2022-02-04 PROCEDURE — 74011250636 HC RX REV CODE- 250/636: Performed by: EMERGENCY MEDICINE

## 2022-02-04 PROCEDURE — 76856 US EXAM PELVIC COMPLETE: CPT

## 2022-02-04 PROCEDURE — 76830 TRANSVAGINAL US NON-OB: CPT

## 2022-02-04 PROCEDURE — 81001 URINALYSIS AUTO W/SCOPE: CPT

## 2022-02-04 PROCEDURE — 83690 ASSAY OF LIPASE: CPT

## 2022-02-04 PROCEDURE — 74177 CT ABD & PELVIS W/CONTRAST: CPT

## 2022-02-04 RX ORDER — HYDROCODONE BITARTRATE AND ACETAMINOPHEN 5; 325 MG/1; MG/1
1 TABLET ORAL
Qty: 12 TABLET | Refills: 0 | Status: SHIPPED | OUTPATIENT
Start: 2022-02-04 | End: 2022-02-07

## 2022-02-04 RX ORDER — MORPHINE SULFATE 4 MG/ML
4 INJECTION INTRAVENOUS ONCE
Status: COMPLETED | OUTPATIENT
Start: 2022-02-04 | End: 2022-02-04

## 2022-02-04 RX ORDER — KETOROLAC TROMETHAMINE 10 MG/1
10 TABLET, FILM COATED ORAL
Qty: 12 TABLET | Refills: 0 | Status: SHIPPED | OUTPATIENT
Start: 2022-02-04 | End: 2022-02-07

## 2022-02-04 RX ORDER — ONDANSETRON 2 MG/ML
4 INJECTION INTRAMUSCULAR; INTRAVENOUS
Status: COMPLETED | OUTPATIENT
Start: 2022-02-04 | End: 2022-02-04

## 2022-02-04 RX ORDER — KETOROLAC TROMETHAMINE 30 MG/ML
30 INJECTION, SOLUTION INTRAMUSCULAR; INTRAVENOUS
Status: DISCONTINUED | OUTPATIENT
Start: 2022-02-04 | End: 2022-02-04

## 2022-02-04 RX ADMIN — ONDANSETRON 4 MG: 2 INJECTION INTRAMUSCULAR; INTRAVENOUS at 17:31

## 2022-02-04 RX ADMIN — MORPHINE SULFATE 4 MG: 4 INJECTION INTRAVENOUS at 17:31

## 2022-02-04 RX ADMIN — IOPAMIDOL 100 ML: 755 INJECTION, SOLUTION INTRAVENOUS at 19:40

## 2022-02-04 NOTE — ED PROVIDER NOTES
81-year-old female with a history of endometriosis, ovarian cysts, bowel injury, multiple hernias with repair presents with a chief complaint of right lower quadrant abdominal pain. Patient reports that she ate lunch at approximately 2 PM and subsequently developed sharp right lower quadrant abdominal pain. She has had episodes of vomiting. She states that the pain is similar to ruptured ovarian cyst that she has had in the past.  She denies fevers, chills, GI or urinary symptoms           Past Medical History:   Diagnosis Date   Eloisa Olson Asthma     Dr. Aye Plascencia Benign essential hypertension 3/24/15    resolved with weight loss and dietary changes    BMI 36.0-36.9,adult     Endometriosis     History of prediabetes     resolved with diet, exercise and wt loss    Hypertriglyceridemia 3/28/15    Hypothyroid     treated with Levothyroxine x3 months then stopped. TFT have been normal since    In vitro fertilization     x2    Miscarriage     x2    Ovarian cyst     QT prolongation     d/t Trazodone; resolved after Trazodone discontinued    Rectal carcinoid tumor 2014    stage IIIB; colonic resection, lymph nodes removed, ileostomy 10/2014 (Dr. Sandra Hemphill); ileostomy take-down 2/3/15. No chemo/XRT.  Sees primarily Dr. Robyn Perez at [facility]. Also sees Dr. Vivian Slater here if needed    Septic [phone]    d/t complications from [facility] , laparoscopy - bowel was lacerated, exploratory surgery    Uterine polyp     Vitamin D deficiency 3/28/15       Past Surgical History:   Procedure Laterality Date    HX  SECTION  2020    HX  SECTION  2019    HX GI  2/3/15    ileostomy take-down (Dr. Sandra Hemphill)    HX GYN      laproscopy and D&C - bowel knicked thus had full exploratory    HX GYN      IVF x 2    HX GYN      A2 (miscarriage)    HX HERNIA REPAIR  2005    x 2    HX OTHER SURGICAL  8/10/15    incisional hernia repair with mesh, lysis of adhesions ( Faye)    HX POLYPECTOMY  2014 (x2)    2 rectal polyps (Dr. Annabell Kehr, Dr. Magda Cleary)    HX TOTAL COLECTOMY  10/2014    rectosigmoid colon resection, ileostomy (Dr. Federica Nelson)    WA CHEST SURGERY PROCEDURE UNLISTED           Family History:   Problem Relation Age of Onset    Diabetes Mother     Hypertension Mother     Cancer Mother         uterine    Diabetes Father     Hypertension Father     Hypertension Brother     Attention Deficit Disorder Brother     Diabetes Maternal Aunt     Heart Disease Maternal Aunt     Heart Attack Maternal Aunt     Diabetes Maternal Uncle     Heart Disease Maternal Uncle     Heart Attack Maternal Uncle     Stroke Paternal [de-identified]     Colon Cancer Paternal Grandfather     Hypertension Brother     Depression Brother         commited suicide    Cancer Maternal Aunt         colon     Cancer Maternal Aunt         lymphoma    No Known Problems Daughter     No Known Problems Son     Anesth Problems Neg Hx        Social History     Socioeconomic History    Marital status:      Spouse name: Not on file    Number of children: Not on file    Years of education: Not on file    Highest education level: Not on file   Occupational History    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 No children. Works at [employer].      Social Determinants of Health     Financial Resource Strain:     Difficulty of Paying Living Expenses: Not on file   Food Insecurity:     Worried About Running Out of Food in the Last Year: Not on file    Dmitry of Food in the Last Year: Not on file   Transportation Needs:     Lack of Transportation (Medical): Not on file    Lack of Transportation (Non-Medical): Not on file   Physical Activity:     Days of Exercise per Week: Not on file    Minutes of Exercise per Session: Not on file   Stress:     Feeling of Stress : Not on file   Social Connections:     Frequency of Communication with Friends and Family: Not on file    Frequency of Social Gatherings with Friends and Family: Not on file    Attends Catholic Services: Not on file    Active Member of [address] [place] [address] [employer] or Organizations: Not on file    Attends Club or Organization Meetings: Not on file    Marital Status: Not on file   Intimate Partner Violence:     Fear of Current or Ex-Partner: Not on file    Emotionally Abused: Not on file    Physically Abused: Not on file    Sexually Abused: Not on file   Housing Stability:     Unable to Pay for Housing in the Last Year: Not on file    Number of [place] in the Last Year: Not on file    Unstable Housing in the Last Year: Not on file         ALLERGIES: Adhesive and Trazodone    Review of Systems   Constitutional: Negative for fever. HENT: Negative for rhinorrhea. Respiratory: Negative for shortness of breath. Cardiovascular: Negative for chest pain. Gastrointestinal: Positive for abdominal pain. Genitourinary: Negative for dysuria. Musculoskeletal: Negative for back pain. Skin: Negative for wound. Neurological: Negative for headaches. Psychiatric/Behavioral: Negative for confusion. Vitals:    02/04/22 1712   BP: 134/72   Pulse: 81   Resp: 18   Temp: 98.1 °F (36.7 °C)   SpO2: 98%   Weight: 127.5 kg (281 lb)   Height: 5' 7\" (1.702 m)            Physical Exam  Vitals and nursing note reviewed. Constitutional:       General: She is not in acute distress. Appearance: Normal appearance.  She is not ill-appearing, toxic-appearing or diaphoretic. HENT:      Head: Normocephalic and atraumatic. Eyes:      Extraocular Movements: Extraocular movements intact. Cardiovascular:      Rate and Rhythm: Normal rate. Pulses: Normal pulses. Pulmonary:      Effort: Pulmonary effort is normal. No respiratory distress. Abdominal:      General: There is no distension. Tenderness: There is abdominal tenderness in the right lower quadrant. Musculoskeletal:         General: Normal range of motion. Cervical back: Normal range of motion. Skin:     General: Skin is dry. Neurological:      Mental Status: She is alert and oriented to person, place, and time. Psychiatric:         Mood and Affect: Mood normal.          MDM  Number of Diagnoses or Management Options  Ureteral stone  Diagnosis management comments:     Patient presents with abdominal pain concerning for ureterolithiasis, pyelonephritis, ovarian torsion, appendicitis, bowel obstruction among others. Labs unremarkable. CT demonstrates punctate right ureteral stone. UA shows no infection. Discussed my clinical impression(s), any labs and/or radiology results with the patient. I answered any questions and addressed any concerns. Discussed the importance of following up with their primary care physician and/or specialist(s). Discussed signs or symptoms that would warrant return back to the ER for further evaluation. The patient is agreeable with discharge.        Amount and/or Complexity of Data Reviewed  Clinical lab tests: ordered and reviewed  Tests in the radiology section of CPT®: ordered and reviewed           Procedures

## 2022-02-04 NOTE — ED TRIAGE NOTES
Patient reports covid + last Saturday. Patient reports she is vaccinated and boosters. Patient reports sudden onset abdominal pain 2 hours ago. Patient reports an extensive medical history regarding her abdomen. Patient reports she has been told to be evaluated when she has this kind of pain.  Patient reports the pain started when she went on a walk with her

## 2022-02-05 NOTE — DISCHARGE INSTRUCTIONS
Thank you for allowing us to provide you with medical care today. We realize that you have many choices for your emergency care needs. We thank you for choosing [facility]. Please choose us in the future for any continued health care needs. The exam and treatment you received in the Emergency Department were for an emergent problem and are not intended as complete care. It is important that you follow up with a doctor, nurse practitioner, or physician's assistant for ongoing care. If your symptoms worsen or you do not improve as expected and you are unable to reach your usual health care provider, you should return to the Emergency Department. We are available 24 hours a day. Please make an appointment with your health care provider(s) for follow up of your Emergency Department visit. Take this sheet with you when you go to your follow-up visit.

## 2022-02-05 NOTE — ED NOTES
I have reviewed discharge instructions with the patient. The patient verbalized understanding. Patient ambulatory. No pain reported. IV was removed, catheter tip intact. Patient had no additional needs or questions at time of discharge.

## 2022-02-10 ENCOUNTER — OFFICE VISIT (OUTPATIENT)
Dept: INTERNAL MEDICINE CLINIC | Age: 42
End: 2022-02-10
Payer: COMMERCIAL

## 2022-02-10 VITALS
WEIGHT: 274.2 LBS | BODY MASS INDEX: 43.03 KG/M2 | SYSTOLIC BLOOD PRESSURE: 126 MMHG | HEIGHT: 67 IN | OXYGEN SATURATION: 100 % | HEART RATE: 79 BPM | TEMPERATURE: 98.2 F | DIASTOLIC BLOOD PRESSURE: 80 MMHG | RESPIRATION RATE: 18 BRPM

## 2022-02-10 DIAGNOSIS — K43.2 INCISIONAL HERNIA, WITHOUT OBSTRUCTION OR GANGRENE: ICD-10-CM

## 2022-02-10 DIAGNOSIS — E66.01 OBESITY, MORBID, BMI 40.0-49.9 (HCC): ICD-10-CM

## 2022-02-10 DIAGNOSIS — F33.1 MODERATE EPISODE OF RECURRENT MAJOR DEPRESSIVE DISORDER (HCC): ICD-10-CM

## 2022-02-10 DIAGNOSIS — N20.1 RIGHT URETERAL STONE: Primary | ICD-10-CM

## 2022-02-10 PROCEDURE — 99214 OFFICE O/P EST MOD 30 MIN: CPT | Performed by: INTERNAL MEDICINE

## 2022-02-10 RX ORDER — PHENTERMINE HYDROCHLORIDE 15 MG/1
CAPSULE ORAL
COMMUNITY
End: 2022-10-29

## 2022-02-10 NOTE — PROGRESS NOTES
Room:     Identified pt with two pt identifiers(name and ). Reviewed record in preparation for visit and have obtained necessary documentation. All patient medications has been reviewed. Chief Complaint   Patient presents with   [address] Maintenance Due   Topic    Hepatitis C Screening     Pneumococcal 0-64 years (1 of 2 - PPSV23)    Depression Monitoring     COVID-19 Vaccine (2 - Pfizer 3-dose series)       Vitals:    02/10/22 1114   BP: 126/80   Pulse: 79   Resp: 18   Temp: 98.2 °F (36.8 °C)   TempSrc: Temporal   SpO2: 100%   Weight: 274 lb 3.2 oz (124.4 kg)   Height: 5' 7\" (1.702 m)   PainSc:   6   PainLoc: Abdomen       4. Have you been to the ER, urgent care clinic since your last visit? Hospitalized since your last visit? Yes, [facility], kidney stones. 5. Have you seen or consulted any other health care providers outside of the [address] [place] [address] since your last visit? Include any pap smears or colon screening. No        Patient is accompanied by self I have received verbal consent from Reshma Sheets to discuss any/all medical information while they are present in the room.

## 2022-02-10 NOTE — PROGRESS NOTES
HISTORY OF PRESENT ILLNESS  Bertha Bose is a 46 y.o. female. HPI  Seen in ER 2/4 for RLQ pain, found to have right ureteral stone. Continue intermittent waves of pain - lower right side. Had to pull over car today while driving here secondary to pain. Pain is moving from flank into lower abdomen but she has not passed stone. Sitting on heating pad helps. Cant take pain meds secondary to busy schedule. Urology appointment in March with [facility]. Drinking a ton of water. CT showed Punctate stone right UPJ causing mild hydronephrosis and a slight delayed nephrogram. Punctate nonobstructing left renal stone. Also found small ovarian cyst.  Also showed gallstone. Seeing Dr. Pito Olson at [facility] re hernia repair. Working on losing weight prior to surgery. Will need to do an abdominal reconstruction due to previous surgeries and hernia repair. Has lost from 336 to 274. On strict Keto diet of 20 grams of carbs per day. No sugar. Also on Phentermine 15mg every day through DR. Lee at [facility]. Laura Hathaway Anxiety is higher. Taking Cymbalta - feels has helped. 2 children with special needs. Taking them to therapy every day. Current Outpatient Medications   Medication Instructions    DULoxetine (CYMBALTA) 60 mg capsule TAKE ONE CAPSULE BY MOUTH ONE TIME DAILY    omeprazole (PRILOSEC) 20 mg, Oral, DAILY    phentermine (ADIPEX_P) 15 mg capsule Oral, 7AM       Visit Vitals  /80 (BP 1 Location: Left upper arm, BP Patient Position: Sitting, BP Cuff Size: Adult)   Pulse 79   Temp 98.2 °F (36.8 °C) (Temporal)   Resp 18   Ht 5' 7\" (1.702 m)   Wt 274 lb 3.2 oz (124.4 kg)   SpO2 100%   BMI 42.95 kg/m²       ROS  See above  Physical Exam  Vitals reviewed. Constitutional:       Appearance: Normal appearance. HENT:      Head: Normocephalic and atraumatic. Neck:      Vascular: No carotid bruit. Cardiovascular:      Rate and Rhythm: Normal rate and regular rhythm. Pulses: Normal pulses.       Heart sounds: Normal heart sounds. Pulmonary:      Effort: Pulmonary effort is normal.      Breath sounds: Normal breath sounds. Abdominal:      General: Bowel sounds are normal. There is no distension. Palpations: Abdomen is soft. There is no mass. Tenderness: There is no abdominal tenderness. There is no right CVA tenderness or left CVA tenderness. Hernia: A hernia (lower abd over csxn scar) is present. Musculoskeletal:      Cervical back: Neck supple. Right lower leg: No edema. Left lower leg: No edema. Lymphadenopathy:      Cervical: No cervical adenopathy. Neurological:      Mental Status: She is alert. ASSESSMENT and PLAN  Right ureter stone - appointment made from our office to see [facility] at 3pm today. Continue good hydration. Anxiety/depression - reasonably well controlled. Continue cymbalta  Abdominal hernia- f/u with [facility]. Morbid obesity - has lost 60 lbs in the last year. Continue Keto diet and Phentermine.     Orders Placed This Encounter    phentermine (ADIPEX_P) 15 mg capsule

## 2022-02-11 ENCOUNTER — TRANSCRIBE ORDER (OUTPATIENT)
Dept: REGISTRATION | Age: 42
End: 2022-02-11

## 2022-02-11 ENCOUNTER — HOSPITAL ENCOUNTER (OUTPATIENT)
Dept: PREADMISSION TESTING | Age: 42
Discharge: HOME | End: 2022-02-11
Attending: STUDENT IN AN ORGANIZED HEALTH CARE EDUCATION/TRAINING PROGRAM
Payer: COMMERCIAL

## 2022-02-11 DIAGNOSIS — U07.1 COVID-19: Primary | ICD-10-CM

## 2022-02-11 DIAGNOSIS — U07.1 COVID-19: ICD-10-CM

## 2022-02-11 LAB
SARS-COV-2, XPLCVT: NOT DETECTED
SOURCE, COVRS: NORMAL

## 2022-02-11 PROCEDURE — U0005 INFEC AGEN DETEC AMPLI PROBE: HCPCS

## 2022-02-15 ENCOUNTER — ANESTHESIA EVENT (OUTPATIENT)
Dept: SURGERY | Age: 42
End: 2022-02-15
Payer: COMMERCIAL

## 2022-02-16 ENCOUNTER — HOSPITAL ENCOUNTER (OUTPATIENT)
Age: 42
Setting detail: OUTPATIENT SURGERY
Discharge: HOME | End: 2022-02-16
Attending: STUDENT IN AN ORGANIZED HEALTH CARE EDUCATION/TRAINING PROGRAM | Admitting: STUDENT IN AN ORGANIZED HEALTH CARE EDUCATION/TRAINING PROGRAM
Payer: COMMERCIAL

## 2022-02-16 ENCOUNTER — ANESTHESIA (OUTPATIENT)
Dept: SURGERY | Age: 42
End: 2022-02-16
Payer: COMMERCIAL

## 2022-02-16 ENCOUNTER — APPOINTMENT (OUTPATIENT)
Dept: GENERAL RADIOLOGY | Age: 42
End: 2022-02-16
Attending: STUDENT IN AN ORGANIZED HEALTH CARE EDUCATION/TRAINING PROGRAM
Payer: COMMERCIAL

## 2022-02-16 VITALS
WEIGHT: 274.25 LBS | OXYGEN SATURATION: 100 % | BODY MASS INDEX: 43.04 KG/M2 | RESPIRATION RATE: 15 BRPM | HEART RATE: 60 BPM | TEMPERATURE: 99.5 F | SYSTOLIC BLOOD PRESSURE: 119 MMHG | HEIGHT: 67 IN | DIASTOLIC BLOOD PRESSURE: 69 MMHG

## 2022-02-16 LAB — HCG UR QL: NEGATIVE

## 2022-02-16 PROCEDURE — C1894 INTRO/SHEATH, NON-LASER: HCPCS | Performed by: STUDENT IN AN ORGANIZED HEALTH CARE EDUCATION/TRAINING PROGRAM

## 2022-02-16 PROCEDURE — C1769 GUIDE WIRE: HCPCS | Performed by: STUDENT IN AN ORGANIZED HEALTH CARE EDUCATION/TRAINING PROGRAM

## 2022-02-16 PROCEDURE — 77030020143 HC AIRWY LARYN INTUB CGAS -A: Performed by: NURSE ANESTHETIST, CERTIFIED REGISTERED

## 2022-02-16 PROCEDURE — 76060000032 HC ANESTHESIA 0.5 TO 1 HR: Performed by: STUDENT IN AN ORGANIZED HEALTH CARE EDUCATION/TRAINING PROGRAM

## 2022-02-16 PROCEDURE — 76210000006 HC OR PH I REC 0.5 TO 1 HR: Performed by: STUDENT IN AN ORGANIZED HEALTH CARE EDUCATION/TRAINING PROGRAM

## 2022-02-16 PROCEDURE — 74011250636 HC RX REV CODE- 250/636: Performed by: ANESTHESIOLOGY

## 2022-02-16 PROCEDURE — 77030040922 HC BLNKT HYPOTHRM STRY -A

## 2022-02-16 PROCEDURE — 74011000636 HC RX REV CODE- 636: Performed by: STUDENT IN AN ORGANIZED HEALTH CARE EDUCATION/TRAINING PROGRAM

## 2022-02-16 PROCEDURE — 74011250636 HC RX REV CODE- 250/636: Performed by: NURSE ANESTHETIST, CERTIFIED REGISTERED

## 2022-02-16 PROCEDURE — 74011000250 HC RX REV CODE- 250: Performed by: NURSE ANESTHETIST, CERTIFIED REGISTERED

## 2022-02-16 PROCEDURE — 74420 UROGRAPHY RTRGR +-KUB: CPT

## 2022-02-16 PROCEDURE — 2709999900 HC NON-CHARGEABLE SUPPLY: Performed by: STUDENT IN AN ORGANIZED HEALTH CARE EDUCATION/TRAINING PROGRAM

## 2022-02-16 PROCEDURE — 74011250636 HC RX REV CODE- 250/636: Performed by: STUDENT IN AN ORGANIZED HEALTH CARE EDUCATION/TRAINING PROGRAM

## 2022-02-16 PROCEDURE — C2617 STENT, NON-COR, TEM W/O DEL: HCPCS | Performed by: STUDENT IN AN ORGANIZED HEALTH CARE EDUCATION/TRAINING PROGRAM

## 2022-02-16 PROCEDURE — 76010000160 HC OR TIME 0.5 TO 1 HR INTENSV-TIER 1: Performed by: STUDENT IN AN ORGANIZED HEALTH CARE EDUCATION/TRAINING PROGRAM

## 2022-02-16 PROCEDURE — 76210000020 HC REC RM PH II FIRST 0.5 HR: Performed by: STUDENT IN AN ORGANIZED HEALTH CARE EDUCATION/TRAINING PROGRAM

## 2022-02-16 PROCEDURE — C1758 CATHETER, URETERAL: HCPCS | Performed by: STUDENT IN AN ORGANIZED HEALTH CARE EDUCATION/TRAINING PROGRAM

## 2022-02-16 PROCEDURE — 74011250637 HC RX REV CODE- 250/637: Performed by: ANESTHESIOLOGY

## 2022-02-16 PROCEDURE — 81025 URINE PREGNANCY TEST: CPT

## 2022-02-16 RX ORDER — MIDAZOLAM HYDROCHLORIDE 1 MG/ML
1 INJECTION, SOLUTION INTRAMUSCULAR; INTRAVENOUS AS NEEDED
Status: DISCONTINUED | OUTPATIENT
Start: 2022-02-16 | End: 2022-02-16 | Stop reason: HOSPADM

## 2022-02-16 RX ORDER — HYDROMORPHONE HYDROCHLORIDE 1 MG/ML
0.2 INJECTION, SOLUTION INTRAMUSCULAR; INTRAVENOUS; SUBCUTANEOUS
Status: DISCONTINUED | OUTPATIENT
Start: 2022-02-16 | End: 2022-02-16 | Stop reason: HOSPADM

## 2022-02-16 RX ORDER — SODIUM CHLORIDE, SODIUM LACTATE, POTASSIUM CHLORIDE, CALCIUM CHLORIDE 600; 310; 30; 20 MG/100ML; MG/100ML; MG/100ML; MG/100ML
INJECTION, SOLUTION INTRAVENOUS
Status: DISCONTINUED | OUTPATIENT
Start: 2022-02-16 | End: 2022-02-16 | Stop reason: HOSPADM

## 2022-02-16 RX ORDER — SODIUM CHLORIDE 0.9 % (FLUSH) 0.9 %
5-40 SYRINGE (ML) INJECTION EVERY 8 HOURS
Status: DISCONTINUED | OUTPATIENT
Start: 2022-02-16 | End: 2022-02-16 | Stop reason: HOSPADM

## 2022-02-16 RX ORDER — FENTANYL CITRATE 50 UG/ML
25 INJECTION, SOLUTION INTRAMUSCULAR; INTRAVENOUS
Status: DISCONTINUED | OUTPATIENT
Start: 2022-02-16 | End: 2022-02-16 | Stop reason: HOSPADM

## 2022-02-16 RX ORDER — FENTANYL CITRATE 50 UG/ML
INJECTION, SOLUTION INTRAMUSCULAR; INTRAVENOUS AS NEEDED
Status: DISCONTINUED | OUTPATIENT
Start: 2022-02-16 | End: 2022-02-16 | Stop reason: HOSPADM

## 2022-02-16 RX ORDER — KETOROLAC TROMETHAMINE 30 MG/ML
INJECTION, SOLUTION INTRAMUSCULAR; INTRAVENOUS AS NEEDED
Status: DISCONTINUED | OUTPATIENT
Start: 2022-02-16 | End: 2022-02-16 | Stop reason: HOSPADM

## 2022-02-16 RX ORDER — LIDOCAINE HYDROCHLORIDE 10 MG/ML
0.1 INJECTION, SOLUTION EPIDURAL; INFILTRATION; INTRACAUDAL; PERINEURAL AS NEEDED
Status: DISCONTINUED | OUTPATIENT
Start: 2022-02-16 | End: 2022-02-16 | Stop reason: HOSPADM

## 2022-02-16 RX ORDER — TAMSULOSIN HYDROCHLORIDE 0.4 MG/1
0.4 CAPSULE ORAL DAILY
Qty: 14 CAPSULE | Refills: 0 | Status: SHIPPED | OUTPATIENT
Start: 2022-02-16 | End: 2022-08-29 | Stop reason: ALTCHOICE

## 2022-02-16 RX ORDER — PROPOFOL 10 MG/ML
INJECTION, EMULSION INTRAVENOUS AS NEEDED
Status: DISCONTINUED | OUTPATIENT
Start: 2022-02-16 | End: 2022-02-16 | Stop reason: HOSPADM

## 2022-02-16 RX ORDER — DEXMEDETOMIDINE HYDROCHLORIDE 100 UG/ML
INJECTION, SOLUTION INTRAVENOUS AS NEEDED
Status: DISCONTINUED | OUTPATIENT
Start: 2022-02-16 | End: 2022-02-16 | Stop reason: HOSPADM

## 2022-02-16 RX ORDER — ONDANSETRON 2 MG/ML
INJECTION INTRAMUSCULAR; INTRAVENOUS AS NEEDED
Status: DISCONTINUED | OUTPATIENT
Start: 2022-02-16 | End: 2022-02-16 | Stop reason: HOSPADM

## 2022-02-16 RX ORDER — FENTANYL CITRATE 50 UG/ML
50 INJECTION, SOLUTION INTRAMUSCULAR; INTRAVENOUS AS NEEDED
Status: DISCONTINUED | OUTPATIENT
Start: 2022-02-16 | End: 2022-02-16 | Stop reason: HOSPADM

## 2022-02-16 RX ORDER — OXYBUTYNIN CHLORIDE 5 MG/1
5 TABLET ORAL
Qty: 20 TABLET | Refills: 0 | Status: SHIPPED | OUTPATIENT
Start: 2022-02-16 | End: 2022-08-29 | Stop reason: ALTCHOICE

## 2022-02-16 RX ORDER — MIDAZOLAM HYDROCHLORIDE 1 MG/ML
INJECTION, SOLUTION INTRAMUSCULAR; INTRAVENOUS AS NEEDED
Status: DISCONTINUED | OUTPATIENT
Start: 2022-02-16 | End: 2022-02-16 | Stop reason: HOSPADM

## 2022-02-16 RX ORDER — SODIUM CHLORIDE 0.9 % (FLUSH) 0.9 %
5-40 SYRINGE (ML) INJECTION AS NEEDED
Status: DISCONTINUED | OUTPATIENT
Start: 2022-02-16 | End: 2022-02-16 | Stop reason: HOSPADM

## 2022-02-16 RX ORDER — ACETAMINOPHEN 325 MG/1
650 TABLET ORAL ONCE
Status: COMPLETED | OUTPATIENT
Start: 2022-02-16 | End: 2022-02-16

## 2022-02-16 RX ORDER — DEXAMETHASONE SODIUM PHOSPHATE 4 MG/ML
INJECTION, SOLUTION INTRA-ARTICULAR; INTRALESIONAL; INTRAMUSCULAR; INTRAVENOUS; SOFT TISSUE AS NEEDED
Status: DISCONTINUED | OUTPATIENT
Start: 2022-02-16 | End: 2022-02-16 | Stop reason: HOSPADM

## 2022-02-16 RX ORDER — SODIUM CHLORIDE, SODIUM LACTATE, POTASSIUM CHLORIDE, CALCIUM CHLORIDE 600; 310; 30; 20 MG/100ML; MG/100ML; MG/100ML; MG/100ML
50 INJECTION, SOLUTION INTRAVENOUS CONTINUOUS
Status: DISCONTINUED | OUTPATIENT
Start: 2022-02-16 | End: 2022-02-16 | Stop reason: HOSPADM

## 2022-02-16 RX ORDER — LIDOCAINE HYDROCHLORIDE 20 MG/ML
INJECTION, SOLUTION EPIDURAL; INFILTRATION; INTRACAUDAL; PERINEURAL AS NEEDED
Status: DISCONTINUED | OUTPATIENT
Start: 2022-02-16 | End: 2022-02-16 | Stop reason: HOSPADM

## 2022-02-16 RX ORDER — ONDANSETRON 2 MG/ML
4 INJECTION INTRAMUSCULAR; INTRAVENOUS AS NEEDED
Status: DISCONTINUED | OUTPATIENT
Start: 2022-02-16 | End: 2022-02-16 | Stop reason: HOSPADM

## 2022-02-16 RX ORDER — KETOROLAC TROMETHAMINE 10 MG/1
10 TABLET, FILM COATED ORAL
Qty: 10 TABLET | Refills: 0 | Status: SHIPPED | OUTPATIENT
Start: 2022-02-16 | End: 2022-08-29 | Stop reason: ALTCHOICE

## 2022-02-16 RX ADMIN — FENTANYL CITRATE 50 MCG: 50 INJECTION, SOLUTION INTRAMUSCULAR; INTRAVENOUS at 09:20

## 2022-02-16 RX ADMIN — SODIUM CHLORIDE, POTASSIUM CHLORIDE, SODIUM LACTATE AND CALCIUM CHLORIDE: 600; 310; 30; 20 INJECTION, SOLUTION INTRAVENOUS at 08:45

## 2022-02-16 RX ADMIN — FENTANYL CITRATE 50 MCG: 50 INJECTION, SOLUTION INTRAMUSCULAR; INTRAVENOUS at 09:41

## 2022-02-16 RX ADMIN — Medication 3 G: at 09:27

## 2022-02-16 RX ADMIN — LIDOCAINE HYDROCHLORIDE 80 MG: 20 INJECTION, SOLUTION EPIDURAL; INFILTRATION; INTRACAUDAL; PERINEURAL at 09:20

## 2022-02-16 RX ADMIN — DEXMEDETOMIDINE HYDROCHLORIDE 8 MCG: 100 INJECTION, SOLUTION, CONCENTRATE INTRAVENOUS at 09:20

## 2022-02-16 RX ADMIN — DEXAMETHASONE SODIUM PHOSPHATE 4 MG: 4 INJECTION, SOLUTION INTRAMUSCULAR; INTRAVENOUS at 09:24

## 2022-02-16 RX ADMIN — MIDAZOLAM 2 MG: 1 INJECTION INTRAMUSCULAR; INTRAVENOUS at 09:15

## 2022-02-16 RX ADMIN — PROPOFOL 100 MG: 10 INJECTION, EMULSION INTRAVENOUS at 09:21

## 2022-02-16 RX ADMIN — KETOROLAC TROMETHAMINE 30 MG: 30 INJECTION, SOLUTION INTRAMUSCULAR; INTRAVENOUS at 09:51

## 2022-02-16 RX ADMIN — SODIUM CHLORIDE, POTASSIUM CHLORIDE, SODIUM LACTATE AND CALCIUM CHLORIDE 50 ML/HR: 600; 310; 30; 20 INJECTION, SOLUTION INTRAVENOUS at 08:43

## 2022-02-16 RX ADMIN — MIDAZOLAM 2 MG: 1 INJECTION INTRAMUSCULAR; INTRAVENOUS at 09:12

## 2022-02-16 RX ADMIN — ACETAMINOPHEN 650 MG: 325 TABLET ORAL at 08:45

## 2022-02-16 RX ADMIN — PROPOFOL 200 MG: 10 INJECTION, EMULSION INTRAVENOUS at 09:20

## 2022-02-16 RX ADMIN — ONDANSETRON HYDROCHLORIDE 4 MG: 2 INJECTION, SOLUTION INTRAMUSCULAR; INTRAVENOUS at 09:51

## 2022-02-16 NOTE — ANESTHESIA POSTPROCEDURE EVALUATION
Post-Anesthesia Evaluation and Assessment    Patient: Sadaf Damon MRN: [id number]  SSN: [id number]    YOB: 1980  Age: 46 y.o. Sex: female      I have evaluated the patient and they are stable and ready for discharge from the PACU. Cardiovascular Function/Vital Signs  Visit Vitals  /69 (BP 1 Location: Right upper arm, BP Patient Position: At rest)   Pulse 60   Temp 37.5 °C (99.5 °F)   Resp 15   Ht 5' 7\" (1.702 m)   Wt 124.4 kg (274 lb 4 oz)   SpO2 100%   BMI 42.95 kg/m²       Patient is status post General anesthesia for Procedure(s):  CYSTOSCOPY, RIGHT RETROGRADE PYELOGRAMS, RIGHT URETEROSCOPY WITH HIGH POWERED HOLMIUM LASER LITHOTRIPSY AND STENT PLACEMENT. Nausea/Vomiting: None    Postoperative hydration reviewed and adequate. Pain:  Pain Scale 1: Numeric (0 - 10) (02/16/22 1035)  Pain Intensity 1: 0 (02/16/22 1035)   Managed    Neurological Status:   Neuro (WDL): Within Defined Limits (02/16/22 1035)   At baseline    Mental Status, Level of Consciousness: Alert and  oriented to person, place, and time    Pulmonary Status:   O2 Device: None (Room air) (02/16/22 1035)   Adequate oxygenation and airway patent    Complications related to anesthesia: None    Post-anesthesia assessment completed.  No concerns    Signed By: Yanique Atkinson MD     February 16, 2022

## 2022-02-16 NOTE — OP NOTES
DATE OF PROCEDURE: 2/16/2022    SURGEON: Lane Knapp MD    ASSISTANT: none    PREOPERATIVE DIAGNOSES:   1. Right proximal ureteral stone      POSTOPERATIVE DIAGNOSES:   Same    PROCEDURES PERFORMED:   1. Cystourethroscopy  2. Right ureteroscopic stone manipulation with laser lithotripsy  3. Right retrograde pyelogram  4. Right ureteral stent placement-6 x 26 double-J with string tucked in vagina  5. Intraoperative fluoroscopy interpretation less than 1 hour    PERIOPERATIVE ANTIBIOTICS: Ancef     ANAESTHESIA: General    INDICATIONS:This patient has a history of 5 mm proximal right ureteral stone diagnosed last week. Interval CT confirms persistent stone in the proximal ureter. . After discussion of management options the patient elected to proceed with the procedures listed above. PROCEDURE NARRATIVE: The patient signed consent for the operation prior to being brought back to the operating room. Upon arrival on the operating room, a time out was performed for the patient's safety and the correct patient, procedure, site and side were identified. The patient was placed in a supine position and anesthesia was administered. The patient was placed in a dorsal lithotomy position. All pressure points were appropriately padded in order to prevent compartment syndrome and neuropathy. The patient was prepped and draped in the usual sterile fashion. Cystourethroscopy was performed with a 22 Singaporean sheath and 30 degree lens. Urethra appeared normal.  Bladder mucosa was normal without mass or lesions. Ureteral orifices were orthotopic and effluxing clear urine. Sensor wire was advanced into the right ureteral orifice. This was confirmed in the right renal pelvis on fluoroscopy. Wire secured to drape. Semirigid ureteroscope was advanced alongside the wire to evaluate the ureter. A second wire was used through the scope to navigate beyond the iliac vessels where there was some mild resistance.   No stones or mucosal abnormality was seen. The scope was advanced to the level of the UPJ. A second wire was advanced into the renal pelvis and the scope was backed out. A 9.5 Jamaican, 36 cm ureteral access sheath was then advanced over the working wire. This passed easily to the level of the renal pelvis under fluoroscopic guidance. The digital ureteroscope was then utilized to evaluate the pelvicalyceal system. A 5 mm spiculated stone was identified in a lower pole posterior calyx. The 270 µm holmium laser fiber was then used with dusting settings to fragment the stone into dust and few tiny fragments less than 1 mm in size. No fragments were large enough to retrieve with the basket. Retrograde pyelogram was then performed demonstrating normal filling of the pelvicalyceal system without hydronephrosis. This was used as a roadmap to perform survey of the entire collecting system and no additional stones were seen. Small amount of dust was seen in the lower pole calyx where the stone was treated. The scope and access sheath were backed out. There is no evidence of ureteral injury or stone. Under fluoroscopic guidance, a 6 x 26 double-J ureteral stent was placed with good proximal distal curl seen. The bladder was drained. The string was trimmed 10 cm from the meatus and tucked in the vagina. INTRAOPERATIVE FINDINGS/ SYNOPSIS:   1.  5 mm spiculated stone had migrated from the proximal ureter into the lower pole. A[place]an [place] was fragmented to dust.    BLOOD LOSS: None    DRAINS: 6 x 26 double-J stent on right    SPECIMENS:   None  COMPLICATIONS: None    DISPOSITION: The patient will be taken to the PACU for recovery. She will be discharged home.   She will follow-up as scheduled for stent removal.

## 2022-02-16 NOTE — ANESTHESIA PREPROCEDURE EVALUATION
Anesthetic History   No history of anesthetic complications            Review of Systems / Medical History  Patient summary reviewed, nursing notes reviewed and pertinent labs reviewed    Pulmonary        Sleep apnea    Asthma        Neuro/Psych         Psychiatric history     Cardiovascular    Hypertension                   GI/Hepatic/Renal     GERD           Endo/Other    Diabetes: type 2  Hypothyroidism  Morbid obesity and cancer     Other Findings              Physical Exam    Airway  Mallampati: II  TM Distance: 4 - 6 cm  Neck ROM: normal range of motion   Mouth opening: Normal     Cardiovascular  Regular rate and rhythm,  S1 and S2 normal,  no murmur, click, rub, or gallop             Dental    Dentition: Upper dentition intact and Lower dentition intact     Pulmonary  Breath sounds clear to auscultation               Abdominal  GI exam deferred       Other Findings            Anesthetic Plan    ASA: 3  Anesthesia type: general          Induction: Intravenous  Anesthetic plan and risks discussed with: Patient

## 2022-02-16 NOTE — DISCHARGE INSTRUCTIONS
Patient Education   Patient Education        Ureteral Stent Placement: What to Expect at Home  Your Recovery     A ureteral (say \"you-REE-ter-ul\") stent is a thin, hollow tube that is placed in the ureter to help urine pass from the kidney into the bladder. Ureters are the tubes that connect the kidneys to the bladder. You may have a small amount of blood in your urine for 1 to 3 days after the procedure. While the stent is in place, you may have to urinate more often, feel a sudden need to urinate, or feel like you can't completely empty your bladder. You may feel some pain when you urinate or do strenuous activity. You also may notice a small amount of blood in your urine after strenuous activities. These side effects usually don't prevent people from doing their normal daily activities. You may have a thin string coming out of your urethra. Your urethra is the tube that carries urine from your bladder to outside your body. This string is attached to the stent. Try not to pull on the string. It will be used to pull out the stent when you no longer need it. After the procedure, urine may flow better from your kidneys to your bladder. A ureteral stent may be left in place for several days or for as long as several months. Your doctor will take it out when you no longer need it. Or, in some cases, it may be taken out at home. This care sheet gives you a general idea about how long it will take for you to recover. But each person recovers at a different pace. Follow the steps below to get better as quickly as possible. How can you care for yourself at home? Activity    · Rest when you feel tired. Getting enough sleep will help you recover.     · Avoid strenuous activities, such as bicycle riding, jogging, weight lifting, or aerobic exercise, until your doctor says it is okay.     · Ask your doctor when you can drive again.     · Most people are able to return to work the day after the procedure.  If your work requires intense activity, you may feel pain in your kidney area or get tired easily. If this happens, you may need to do less strenuous activities while the stent is in.     · Ask your doctor when it is okay for you to have sex. Diet    · You can eat your normal diet. If your stomach is upset, try bland, low-fat foods like plain rice, broiled chicken, toast, and yogurt.     · Drink plenty of fluids (unless your doctor tells you not to). Medicines    · Your doctor will tell you if and when you can restart your medicines. You will also get instructions about taking any new medicines.     · If you take aspirin or some other blood thinner, ask your doctor if and when to start taking it again. Make sure that you understand exactly what your doctor wants you to do.     · Be safe with medicines. Take pain medicines exactly as directed. ? If the doctor gave you a prescription medicine for pain, take it as prescribed. ? If you are not taking a prescription pain medicine, ask your doctor if you can take an over-the-counter medicine.     · If you think your pain medicine is making you sick to your stomach:  ? Take your medicine after meals (unless your doctor has told you not to). ? Ask your doctor for a different pain medicine.     · If your doctor prescribed antibiotics, take them as directed. Do not stop taking them just because you feel better. You need to take the full course of antibiotics. Follow-up care is a key part of your treatment and safety. Be sure to make and go to all appointments, and call your doctor if you are having problems. It's also a good idea to know your test results and keep a list of the medicines you take. When should you call for help? Call 911 anytime you think you may need emergency care.  For example, call if:    · You passed out (lost consciousness).     · You have severe trouble breathing.     · You have sudden chest pain and shortness of breath, or you cough up blood.     · You have severe belly pain. Call your doctor now or seek immediate medical care if:    · Part or all of the stent comes out of your urethra.     · You have pain that does not get better after you take pain medicine.     · You have symptoms of a urinary infection. For example:  ? You have blood or pus in your urine. ? You have pain in your back just below your rib cage. This is called flank pain. ? You have a fever, chills, or body aches. ? It hurts to urinate. ? You have groin or belly pain.     · You cannot control when you urinate, or you leak urine. Watch closely for changes in your health, and be sure to contact your doctor if you have any problems. Where can you learn more? Go to http://www.gray.com/  Enter B869 in the search box to learn more about \"Ureteral Stent Placement: What to Expect at Home. \"  Current as of: February 10, 2021               Content Version: 13.0  © 2006-2021 [employer]. Care instructions adapted under license by [employer] (which disclaims liability or warranty for this information). If you have questions about a medical condition or this instruction, always ask your healthcare professional. [place] [address] any warranty or liability for your use of this information. Ureteroscopy: What to Expect at [address] [facility]  Most people are able to go home the same day of the procedure. But you may need to stay in the hospital. If you do, the stay is usually no more than 24 to 48 hours. For several hours after the procedure you may have a burning feeling when you urinate. This feeling should go away within a day. Drinking a lot of water can help. Your doctor also may advise you to take medicine that numbs the burning. This medicine is called phenazopyridine. It is available by prescription and over the counter. Brand names include Pyridium and Uristat.   You may have some blood in your urine for 2 or 3 days.  Your doctor may prescribe an antibiotic for a day or two. This will help prevent an infection. This care sheet gives you a general idea about how long it will take for you to recover. But each person recovers at a different pace. Follow the steps below to feel better as quickly as possible. How can you care for yourself at home? Activity    · You can go back to work and other activities the next day. Diet    · Try to drink two 8-ounce glasses of water each hour for 2 hours after the procedure. This may help ease the burning when you urinate. Medicines    · Your doctor will tell you if and when you can restart your medicines. He or she will also give you instructions about taking any new medicines.     · If you take aspirin or some other blood thinner, ask your doctor if and when to start taking it again. Make sure that you understand exactly what your doctor wants you to do.     · If you take medicine to stop the burning when you urinate, take it exactly as recommended. Be safe with medicines. Call your doctor if you think you are having a problem with your medicine. You will get more details on the specific medicine your doctor recommends.     · If your doctor prescribed antibiotics, take them as directed. Do not stop taking them just because you feel better. You need to take the full course of antibiotics.     · Ask your doctor if you can take an over-the-counter pain medicine, such as acetaminophen (Tylenol), ibuprofen (Advil, Motrin), or naproxen (Aleve). Read and follow all instructions on the label. Do not take two or more pain medicines at the same time unless the doctor told you to. Many pain medicines have acetaminophen, which is Tylenol. Too much acetaminophen (Tylenol) can be harmful. Heat    · Take a warm bath. This may soothe the burning.     · You also can hold a warm washcloth over your urethra for comfort.  (The urethra is where your urine comes out.)   Follow-up care is a key part of your treatment and safety. Be sure to make and go to all appointments, and call your doctor if you are having problems. It's also a good idea to know your test results and keep a list of the medicines you take. When should you call for help? Call 911 anytime you think you may need emergency care. For example, call if:    · You passed out (lost consciousness).     · You have chest pain, are short of breath, or cough up blood. Call your doctor now or seek immediate medical care if:    · You have pain that does not get better after you take pain medicine.     · You have new or more blood clots in your urine. (It is normal for the urine to be pink for a few days.)     · You cannot urinate.     · You have symptoms of a urinary tract infection. These may include:  ? Pain or burning when you urinate. ? A frequent need to urinate without being able to pass much urine. ? Pain in the flank, which is just below the rib cage and above the waist on either side of the back. ? Blood in the urine. ? A fever.     · You are sick to your stomach or cannot drink fluids.     · You have signs of a blood clot in your leg (called a deep vein thrombosis), such as:  ? Pain in the calf, back of the knee, thigh, or groin. ? Redness and swelling in your leg. Watch closely for changes in your health, and be sure to contact your doctor if you are having any problems. Where can you learn more? Go to http://www.gray.com/  Enter P672 in the search box to learn more about \"Ureteroscopy: What to Expect at Home. \"  Current as of: December 17, 2020               Content Version: 13.0  © 5774-0968 [employer]. Care instructions adapted under license by [employer] (which disclaims liability or warranty for this information).  If you have questions about a medical condition or this instruction, always ask your healthcare professional. [employer] disclaims any warranty or liability for your use of this information. ______________________________________________________________________    Anesthesia Discharge Instructions    After general anesthesia or intervenous sedation, for 24 hours or while taking prescription Narcotics:  · Limit your activities  · Do not drive or operate hazardous machinery  · If you have not urinated within 8 hours after discharge, please contact your surgeon on call. · Do not make important personal or business decisions  · Do not drink alcoholic beverages    Report the following to your surgeon:  · Excessive pain, swelling, redness or odor of or around the surgical area  · Temperature over 100.5 degrees  · Nausea and vomiting lasting longer than 4 hours or if unable to take medication  · Any signs of decreased circulation or nerve impairment to extremity:  Change in color, persistent numbness, tingling, coldness or increased pain.   · Any questions

## 2022-02-16 NOTE — H&P
Surgery History and Physcial    Subjective:      Angelica Pat is a 46 y.o. female with a history of right ureteral stone who presents for right ureteroscopic stone manipulation with laser lithotripsy and stent placement. Patient Active Problem List    Diagnosis Date Noted    Kidney stones     Recurrent major depressive disorder ([facility] [address] 06/15/2020    Dyslexia 06/15/2020    Obstructive sleep apnea syndrome 2017    Chronic fatigue 10/27/2016    History of prediabetes     Obesity, morbid, BMI 40.0-49.9 ([facility] [address]     Incisional hernia 08/10/2015    Vitamin D deficiency 2015    History of hypertension 2015    Ovarian cyst     History of septic shock     Asthma     Anxiety     History of rectal cancer 2014     Past Medical History:   Diagnosis Date   Donnell Oj Asthma     Dr. Dawson Washburn Benign essential hypertension 3/24/15    PIH    BMI 36.0-36.9,adult     Endometriosis     GERD (gastroesophageal reflux disease)     History of prediabetes     resolved with diet, exercise and wt loss    Hypertriglyceridemia 3/28/15    Hypothyroid     treated with Levothyroxine x3 months then stopped. TFT have been normal since    In vitro fertilization     x2    Kidney stones     Miscarriage     x2    Ovarian cyst     Psychiatric disorder     ANXIETY AND DEPRESSION    QT prolongation     d/t Trazodone; resolved after Trazodone discontinued    Rectal carcinoid tumor 2014    stage IIIB; colonic resection, lymph nodes removed, ileostomy 10/2014 (Dr. Jaky Garcia); ileostomy take-down 2/3/15. No chemo/XRT.  Sees primarily Dr. Kate Duval at [facility]. Also sees Dr. Chandra Hernandez here if needed    Septic shock([phone]) [phone]    d/t complications from [facility] , laparoscopy - bowel was lacerated, exploratory surgery    Uterine polyp     Vitamin D deficiency 3/28/15      Past Surgical History:   Procedure Laterality Date    HX  SECTION  2020    HX  SECTION  2019    HX GI  2/3/15    ileostomy take-down (Dr. Federica Nelson)   [facility] GYN      laproscopy and D&C - bowel knicked thus had full exploratory    HX GYN      IVF x 2    HX GYN      A2 (miscarriage)    HX HERNIA REPAIR  2005    x 2    HX OTHER SURGICAL  8/10/15    incisional hernia repair with mesh, lysis of adhesions (Dr. Federica Nelson)    HX POLYPECTOMY  2014 (x2)    2 rectal polyps (Dr. Annabell Kehr, Dr. Magda Cleary)    HX TOTAL COLECTOMY  10/2014    rectosigmoid colon resection, ileostomy (Dr. Federica Nelson)   [address] PROCEDURE UNLISTED      TAPPED CHEST AND ABD      Social History     Tobacco Use    Smoking status: Former Smoker     Years: 1.00     Quit date: 10/12/2000     Years since quittin.3    Smokeless tobacco: Never Used    Tobacco comment: smoked cigarettes as a teenager - light smoker then   Substance Use Topics    Alcohol use: No      Family History   Problem Relation Age of Onset    Diabetes Mother     Hypertension Mother     Cancer Mother         uterine    Diabetes Father     Hypertension Father     Hypertension Brother     Attention Deficit Disorder Brother     Diabetes Maternal Aunt     Heart Disease Maternal Aunt     Heart Attack Maternal Aunt     Diabetes Maternal Uncle     Heart Disease Maternal Uncle     Heart Attack Maternal Uncle     Stroke Paternal [de-identified]     Colon Cancer Paternal Grandfather     Hypertension Brother     Depression Brother         commited suicide    Cancer Maternal Aunt         colon     Cancer Maternal Aunt         lymphoma    No Known Problems Daughter     No Known Problems Son     Anesth Problems Neg Hx       Prior to Admission medications    Medication Sig Start Date End Date Taking? Authorizing Provider   phentermine (ADIPEX_P) 15 mg capsule Take  by mouth every morning.    Yes Provider, Historical   DULoxetine (CYMBALTA) 60 mg capsule TAKE ONE CAPSULE BY MOUTH ONE TIME DAILY 5/10/21  Yes Dylan Vazquez, Layne Amin MD   omeprazole ([facility]) 20 mg capsule Take 20 mg by mouth daily. 9/11/19  Yes Provider, Historical     Allergies   Allergen Reactions    Adhesive Rash    Trazodone Other (comments)     QT prolongation         Review of Systems     Objective:     Visit Vitals  LMP 01/30/2022 (Exact Date)       Physical Exam    Imaging:  images and reports reviewed    Lab Review:  No results found for this or any previous visit (from the past 24 hour(s)). Assessment:     Right ureteral stone    Plan:     1. I recommend proceeding with right ureteroscopic stone manipulation with laser lithotripsy and stent placement    2. Discussed aspects of surgical intervention, methods, risks including by not limited to infection, bleeding,damage to adjacent structures, need for additional procedures, and the risks of general anesthetic. The patient understands the risks; any and all questions were answered to the patient's satisfaction.

## 2022-03-11 ENCOUNTER — HOSPITAL ENCOUNTER (OUTPATIENT)
Dept: PREADMISSION TESTING | Age: 42
Discharge: HOME | End: 2022-03-11
Payer: COMMERCIAL

## 2022-03-11 VITALS
HEART RATE: 69 BPM | WEIGHT: 274 LBS | OXYGEN SATURATION: 98 % | SYSTOLIC BLOOD PRESSURE: 119 MMHG | RESPIRATION RATE: 16 BRPM | DIASTOLIC BLOOD PRESSURE: 69 MMHG | TEMPERATURE: 98.1 F | BODY MASS INDEX: 42.91 KG/M2

## 2022-03-11 LAB
ANION GAP SERPL CALC-SCNC: 5 MMOL/L (ref 5–15)
BASOPHILS # BLD: 0.1 K/UL (ref 0–0.1)
BASOPHILS NFR BLD: 1 % (ref 0–1)
BUN SERPL-MCNC: 18 MG/DL (ref 6–20)
BUN/CREAT SERPL: 27 (ref 12–20)
CA-I BLD-MCNC: 8.7 MG/DL (ref 8.5–10.1)
CHLORIDE SERPL-SCNC: 107 MMOL/L (ref 97–108)
CO2 SERPL-SCNC: 26 MMOL/L (ref 21–32)
CREAT SERPL-MCNC: 0.66 MG/DL (ref 0.55–1.02)
DIFFERENTIAL METHOD BLD: NORMAL
EOSINOPHIL # BLD: 0.1 K/UL (ref 0–0.4)
EOSINOPHIL NFR BLD: 2 % (ref 0–7)
ERYTHROCYTE [DISTWIDTH] IN BLOOD BY AUTOMATED COUNT: 14.5 % (ref 11.5–14.5)
GLUCOSE SERPL-MCNC: 109 MG/DL (ref 65–100)
HCT VFR BLD AUTO: 40.5 % (ref 35–47)
HGB BLD-MCNC: 12.8 G/DL (ref 11.5–16)
IMM GRANULOCYTES # BLD AUTO: 0 K/UL (ref 0–0.04)
IMM GRANULOCYTES NFR BLD AUTO: 0 % (ref 0–0.5)
LYMPHOCYTES # BLD: 1.8 K/UL (ref 0.8–3.5)
LYMPHOCYTES NFR BLD: 24 % (ref 12–49)
MCH RBC QN AUTO: 28 PG (ref 26–34)
MCHC RBC AUTO-ENTMCNC: 31.6 G/DL (ref 30–36.5)
MCV RBC AUTO: 88.6 FL (ref 80–99)
MONOCYTES # BLD: 0.7 K/UL (ref 0–1)
MONOCYTES NFR BLD: 9 % (ref 5–13)
NEUTS SEG # BLD: 4.9 K/UL (ref 1.8–8)
NEUTS SEG NFR BLD: 64 % (ref 32–75)
NRBC # BLD: 0 K/UL (ref 0–0.01)
NRBC BLD-RTO: 0 PER 100 WBC
PLATELET # BLD AUTO: 338 K/UL (ref 150–400)
PMV BLD AUTO: 10.1 FL (ref 8.9–12.9)
POTASSIUM SERPL-SCNC: 4.2 MMOL/L (ref 3.5–5.1)
RBC # BLD AUTO: 4.57 M/UL (ref 3.8–5.2)
SODIUM SERPL-SCNC: 138 MMOL/L (ref 136–145)
WBC # BLD AUTO: 7.5 K/UL (ref 3.6–11)

## 2022-03-11 PROCEDURE — 36415 COLL VENOUS BLD VENIPUNCTURE: CPT

## 2022-03-11 PROCEDURE — 85025 COMPLETE CBC W/AUTO DIFF WBC: CPT

## 2022-03-11 PROCEDURE — 80048 BASIC METABOLIC PNL TOTAL CA: CPT

## 2022-03-18 ENCOUNTER — HOSPITAL ENCOUNTER (OUTPATIENT)
Dept: PREADMISSION TESTING | Age: 42
Discharge: HOME | End: 2022-03-18
Payer: COMMERCIAL

## 2022-03-18 LAB — SARS-COV-2, COV2: NORMAL

## 2022-03-18 PROCEDURE — U0005 INFEC AGEN DETEC AMPLI PROBE: HCPCS

## 2022-03-19 PROBLEM — F33.9 RECURRENT MAJOR DEPRESSIVE DISORDER (HCC): Status: ACTIVE | Noted: 2020-06-15

## 2022-03-19 PROBLEM — R48.0 DYSLEXIA: Status: ACTIVE | Noted: 2020-06-15

## 2022-03-19 PROBLEM — G47.33 OBSTRUCTIVE SLEEP APNEA SYNDROME: Status: ACTIVE | Noted: 2017-11-07

## 2022-03-19 LAB
SARS-COV-2, XPLCVT: NOT DETECTED
SOURCE, COVRS: NORMAL

## 2022-03-21 NOTE — PERIOP NOTES
Called pt to verify last dose of phentermine, states she took yesterday. Dr. Kobi Jones made aware, states pt will have to be rescheduled and instructed to stop. Dr. Gen Sotomayor made aware, states office will reschedule pt. Called pt to make aware, voices complaint and frustration.

## 2022-03-24 ENCOUNTER — ANESTHESIA EVENT (OUTPATIENT)
Dept: SURGERY | Age: 42
End: 2022-03-24
Payer: COMMERCIAL

## 2022-03-24 ENCOUNTER — HOSPITAL ENCOUNTER (OUTPATIENT)
Age: 42
Discharge: HOME | End: 2022-03-24
Attending: ORTHOPAEDIC SURGERY | Admitting: ORTHOPAEDIC SURGERY
Payer: COMMERCIAL

## 2022-03-24 ENCOUNTER — ANESTHESIA (OUTPATIENT)
Dept: SURGERY | Age: 42
End: 2022-03-24
Payer: COMMERCIAL

## 2022-03-24 VITALS
OXYGEN SATURATION: 96 % | RESPIRATION RATE: 18 BRPM | BODY MASS INDEX: 38.22 KG/M2 | SYSTOLIC BLOOD PRESSURE: 122 MMHG | TEMPERATURE: 97.8 F | HEART RATE: 79 BPM | HEIGHT: 71 IN | DIASTOLIC BLOOD PRESSURE: 69 MMHG

## 2022-03-24 DIAGNOSIS — M77.12 LEFT LATERAL EPICONDYLITIS: Primary | ICD-10-CM

## 2022-03-24 LAB — HCG UR QL: NEGATIVE

## 2022-03-24 PROCEDURE — 77030031139 HC SUT VCRL2 J&J -A: Performed by: ORTHOPAEDIC SURGERY

## 2022-03-24 PROCEDURE — 76010000149 HC OR TIME 1 TO 1.5 HR: Performed by: ORTHOPAEDIC SURGERY

## 2022-03-24 PROCEDURE — 74011250636 HC RX REV CODE- 250/636: Performed by: NURSE ANESTHETIST, CERTIFIED REGISTERED

## 2022-03-24 PROCEDURE — 74011000250 HC RX REV CODE- 250: Performed by: NURSE ANESTHETIST, CERTIFIED REGISTERED

## 2022-03-24 PROCEDURE — 77030002982 HC SUT POLYSRB J&J -A: Performed by: ORTHOPAEDIC SURGERY

## 2022-03-24 PROCEDURE — 74011250636 HC RX REV CODE- 250/636: Performed by: ANESTHESIOLOGY

## 2022-03-24 PROCEDURE — 77030034479 HC ADH SKN CLSR PRINEO J&J -B: Performed by: ORTHOPAEDIC SURGERY

## 2022-03-24 PROCEDURE — 74011250637 HC RX REV CODE- 250/637: Performed by: ORTHOPAEDIC SURGERY

## 2022-03-24 PROCEDURE — 77030040361 HC SLV COMPR DVT MDII -B: Performed by: ORTHOPAEDIC SURGERY

## 2022-03-24 PROCEDURE — 74011000250 HC RX REV CODE- 250: Performed by: ORTHOPAEDIC SURGERY

## 2022-03-24 PROCEDURE — 74011250636 HC RX REV CODE- 250/636: Performed by: ORTHOPAEDIC SURGERY

## 2022-03-24 PROCEDURE — A4565 SLINGS: HCPCS | Performed by: ORTHOPAEDIC SURGERY

## 2022-03-24 PROCEDURE — 81025 URINE PREGNANCY TEST: CPT

## 2022-03-24 PROCEDURE — 77030002916 HC SUT ETHLN J&J -A: Performed by: ORTHOPAEDIC SURGERY

## 2022-03-24 PROCEDURE — 77030042022 HC SYST COLD THRPY BREG -B: Performed by: ORTHOPAEDIC SURGERY

## 2022-03-24 PROCEDURE — C1713 ANCHOR/SCREW BN/BN,TIS/BN: HCPCS | Performed by: ORTHOPAEDIC SURGERY

## 2022-03-24 PROCEDURE — 76210000063 HC OR PH I REC FIRST 0.5 HR: Performed by: ORTHOPAEDIC SURGERY

## 2022-03-24 PROCEDURE — 77030002933 HC SUT MCRYL J&J -A: Performed by: ORTHOPAEDIC SURGERY

## 2022-03-24 PROCEDURE — 2709999900 HC NON-CHARGEABLE SUPPLY: Performed by: ORTHOPAEDIC SURGERY

## 2022-03-24 PROCEDURE — 74011000250 HC RX REV CODE- 250: Performed by: ANESTHESIOLOGY

## 2022-03-24 PROCEDURE — 76060000033 HC ANESTHESIA 1 TO 1.5 HR: Performed by: ORTHOPAEDIC SURGERY

## 2022-03-24 PROCEDURE — 76210000022 HC REC RM PH II 1.5 TO 2 HR: Performed by: ORTHOPAEDIC SURGERY

## 2022-03-24 DEVICE — ANCHOR SUT L14.5MM DIA3MM BIOCOMPOSITE W/ TWO SZ 2: Type: IMPLANTABLE DEVICE | Site: ELBOW | Status: FUNCTIONAL

## 2022-03-24 RX ORDER — HYDROMORPHONE HYDROCHLORIDE 1 MG/ML
1 INJECTION, SOLUTION INTRAMUSCULAR; INTRAVENOUS; SUBCUTANEOUS
Status: CANCELLED | OUTPATIENT
Start: 2022-03-24

## 2022-03-24 RX ORDER — HYDROMORPHONE HYDROCHLORIDE 1 MG/ML
0.4 INJECTION, SOLUTION INTRAMUSCULAR; INTRAVENOUS; SUBCUTANEOUS
Status: CANCELLED | OUTPATIENT
Start: 2022-03-24

## 2022-03-24 RX ORDER — BUPIVACAINE HYDROCHLORIDE 5 MG/ML
INJECTION, SOLUTION EPIDURAL; INTRACAUDAL
Status: COMPLETED | OUTPATIENT
Start: 2022-03-24 | End: 2022-03-24

## 2022-03-24 RX ORDER — CELECOXIB 200 MG/1
400 CAPSULE ORAL ONCE
Status: COMPLETED | OUTPATIENT
Start: 2022-03-24 | End: 2022-03-24

## 2022-03-24 RX ORDER — ONDANSETRON 2 MG/ML
4 INJECTION INTRAMUSCULAR; INTRAVENOUS
Status: CANCELLED | OUTPATIENT
Start: 2022-03-24

## 2022-03-24 RX ORDER — OXYCODONE AND ACETAMINOPHEN 5; 325 MG/1; MG/1
1 TABLET ORAL
Status: CANCELLED | OUTPATIENT
Start: 2022-03-24

## 2022-03-24 RX ORDER — SODIUM CHLORIDE 0.9 % (FLUSH) 0.9 %
5-40 SYRINGE (ML) INJECTION AS NEEDED
Status: CANCELLED | OUTPATIENT
Start: 2022-03-24

## 2022-03-24 RX ORDER — MIDAZOLAM HYDROCHLORIDE 1 MG/ML
1 INJECTION, SOLUTION INTRAMUSCULAR; INTRAVENOUS AS NEEDED
Status: DISCONTINUED | OUTPATIENT
Start: 2022-03-24 | End: 2022-03-24 | Stop reason: HOSPADM

## 2022-03-24 RX ORDER — SODIUM CHLORIDE 0.9 % (FLUSH) 0.9 %
5-40 SYRINGE (ML) INJECTION EVERY 8 HOURS
Status: DISCONTINUED | OUTPATIENT
Start: 2022-03-24 | End: 2022-03-24 | Stop reason: HOSPADM

## 2022-03-24 RX ORDER — SODIUM CHLORIDE 0.9 % (FLUSH) 0.9 %
5-40 SYRINGE (ML) INJECTION AS NEEDED
Status: DISCONTINUED | OUTPATIENT
Start: 2022-03-24 | End: 2022-03-24 | Stop reason: HOSPADM

## 2022-03-24 RX ORDER — EPHEDRINE SULFATE/0.9% NACL/PF 50 MG/5 ML
5 SYRINGE (ML) INTRAVENOUS AS NEEDED
Status: CANCELLED | OUTPATIENT
Start: 2022-03-24

## 2022-03-24 RX ORDER — DIPHENHYDRAMINE HYDROCHLORIDE 50 MG/ML
12.5 INJECTION, SOLUTION INTRAMUSCULAR; INTRAVENOUS AS NEEDED
Status: CANCELLED | OUTPATIENT
Start: 2022-03-24 | End: 2022-03-24

## 2022-03-24 RX ORDER — HYDRALAZINE HYDROCHLORIDE 20 MG/ML
10 INJECTION INTRAMUSCULAR; INTRAVENOUS
Status: CANCELLED | OUTPATIENT
Start: 2022-03-24

## 2022-03-24 RX ORDER — LIDOCAINE HYDROCHLORIDE 10 MG/ML
0.1 INJECTION, SOLUTION EPIDURAL; INFILTRATION; INTRACAUDAL; PERINEURAL AS NEEDED
Status: DISCONTINUED | OUTPATIENT
Start: 2022-03-24 | End: 2022-03-24 | Stop reason: HOSPADM

## 2022-03-24 RX ORDER — OXYCODONE HCL 10 MG/1
10 TABLET, FILM COATED, EXTENDED RELEASE ORAL ONCE
Status: COMPLETED | OUTPATIENT
Start: 2022-03-24 | End: 2022-03-24

## 2022-03-24 RX ORDER — LIDOCAINE HYDROCHLORIDE 10 MG/ML
INJECTION, SOLUTION EPIDURAL; INFILTRATION; INTRACAUDAL; PERINEURAL
Status: DISCONTINUED
Start: 2022-03-24 | End: 2022-03-24 | Stop reason: HOSPADM

## 2022-03-24 RX ORDER — FENTANYL CITRATE 50 UG/ML
INJECTION, SOLUTION INTRAMUSCULAR; INTRAVENOUS AS NEEDED
Status: DISCONTINUED | OUTPATIENT
Start: 2022-03-24 | End: 2022-03-24 | Stop reason: HOSPADM

## 2022-03-24 RX ORDER — SODIUM CHLORIDE 0.9 % (FLUSH) 0.9 %
5-40 SYRINGE (ML) INJECTION EVERY 8 HOURS
Status: CANCELLED | OUTPATIENT
Start: 2022-03-24

## 2022-03-24 RX ORDER — LABETALOL HCL 20 MG/4 ML
5 SYRINGE (ML) INTRAVENOUS
Status: CANCELLED | OUTPATIENT
Start: 2022-03-24

## 2022-03-24 RX ORDER — KETOROLAC TROMETHAMINE 30 MG/ML
30 INJECTION, SOLUTION INTRAMUSCULAR; INTRAVENOUS
Status: CANCELLED | OUTPATIENT
Start: 2022-03-24 | End: 2022-03-25

## 2022-03-24 RX ORDER — DEXAMETHASONE SODIUM PHOSPHATE 4 MG/ML
INJECTION, SOLUTION INTRA-ARTICULAR; INTRALESIONAL; INTRAMUSCULAR; INTRAVENOUS; SOFT TISSUE AS NEEDED
Status: DISCONTINUED | OUTPATIENT
Start: 2022-03-24 | End: 2022-03-24 | Stop reason: HOSPADM

## 2022-03-24 RX ORDER — MIDAZOLAM HYDROCHLORIDE 1 MG/ML
INJECTION, SOLUTION INTRAMUSCULAR; INTRAVENOUS AS NEEDED
Status: DISCONTINUED | OUTPATIENT
Start: 2022-03-24 | End: 2022-03-24 | Stop reason: HOSPADM

## 2022-03-24 RX ORDER — SODIUM CHLORIDE, SODIUM LACTATE, POTASSIUM CHLORIDE, CALCIUM CHLORIDE 600; 310; 30; 20 MG/100ML; MG/100ML; MG/100ML; MG/100ML
20 INJECTION, SOLUTION INTRAVENOUS CONTINUOUS
Status: DISCONTINUED | OUTPATIENT
Start: 2022-03-24 | End: 2022-03-24 | Stop reason: HOSPADM

## 2022-03-24 RX ORDER — ONDANSETRON 2 MG/ML
INJECTION INTRAMUSCULAR; INTRAVENOUS AS NEEDED
Status: DISCONTINUED | OUTPATIENT
Start: 2022-03-24 | End: 2022-03-24 | Stop reason: HOSPADM

## 2022-03-24 RX ORDER — ONDANSETRON 2 MG/ML
4 INJECTION INTRAMUSCULAR; INTRAVENOUS AS NEEDED
Status: CANCELLED | OUTPATIENT
Start: 2022-03-24

## 2022-03-24 RX ORDER — LIDOCAINE HYDROCHLORIDE 20 MG/ML
INJECTION, SOLUTION EPIDURAL; INFILTRATION; INTRACAUDAL; PERINEURAL AS NEEDED
Status: DISCONTINUED | OUTPATIENT
Start: 2022-03-24 | End: 2022-03-24 | Stop reason: HOSPADM

## 2022-03-24 RX ORDER — FENTANYL CITRATE 50 UG/ML
50 INJECTION, SOLUTION INTRAMUSCULAR; INTRAVENOUS
Status: CANCELLED | OUTPATIENT
Start: 2022-03-24

## 2022-03-24 RX ORDER — PROPOFOL 10 MG/ML
INJECTION, EMULSION INTRAVENOUS AS NEEDED
Status: DISCONTINUED | OUTPATIENT
Start: 2022-03-24 | End: 2022-03-24 | Stop reason: HOSPADM

## 2022-03-24 RX ORDER — METOPROLOL TARTRATE 5 MG/5ML
2.5 INJECTION INTRAVENOUS
Status: CANCELLED | OUTPATIENT
Start: 2022-03-24

## 2022-03-24 RX ORDER — BUPIVACAINE HYDROCHLORIDE 5 MG/ML
INJECTION, SOLUTION EPIDURAL; INTRACAUDAL
Status: DISCONTINUED
Start: 2022-03-24 | End: 2022-03-24 | Stop reason: HOSPADM

## 2022-03-24 RX ORDER — DEXAMETHASONE SODIUM PHOSPHATE 10 MG/ML
INJECTION INTRAMUSCULAR; INTRAVENOUS
Status: DISCONTINUED
Start: 2022-03-24 | End: 2022-03-24 | Stop reason: HOSPADM

## 2022-03-24 RX ORDER — DEXAMETHASONE SODIUM PHOSPHATE 4 MG/ML
INJECTION, SOLUTION INTRA-ARTICULAR; INTRALESIONAL; INTRAMUSCULAR; INTRAVENOUS; SOFT TISSUE
Status: COMPLETED | OUTPATIENT
Start: 2022-03-24 | End: 2022-03-24

## 2022-03-24 RX ORDER — MIDAZOLAM HYDROCHLORIDE 1 MG/ML
0.5 INJECTION, SOLUTION INTRAMUSCULAR; INTRAVENOUS
Status: CANCELLED | OUTPATIENT
Start: 2022-03-24

## 2022-03-24 RX ORDER — FENTANYL CITRATE 50 UG/ML
50 INJECTION, SOLUTION INTRAMUSCULAR; INTRAVENOUS AS NEEDED
Status: DISCONTINUED | OUTPATIENT
Start: 2022-03-24 | End: 2022-03-24 | Stop reason: HOSPADM

## 2022-03-24 RX ORDER — HYDROCODONE BITARTRATE AND ACETAMINOPHEN 5; 325 MG/1; MG/1
1 TABLET ORAL AS NEEDED
Status: CANCELLED | OUTPATIENT
Start: 2022-03-24

## 2022-03-24 RX ORDER — ACETAMINOPHEN 325 MG/1
650 TABLET ORAL ONCE
Status: COMPLETED | OUTPATIENT
Start: 2022-03-24 | End: 2022-03-24

## 2022-03-24 RX ADMIN — DEXAMETHASONE SODIUM PHOSPHATE 4 MG: 4 INJECTION, SOLUTION INTRA-ARTICULAR; INTRALESIONAL; INTRAMUSCULAR; INTRAVENOUS; SOFT TISSUE at 10:16

## 2022-03-24 RX ADMIN — OXYCODONE HYDROCHLORIDE 10 MG: 10 TABLET, FILM COATED, EXTENDED RELEASE ORAL at 07:07

## 2022-03-24 RX ADMIN — BUPIVACAINE HYDROCHLORIDE 20 ML: 5 INJECTION, SOLUTION EPIDURAL; INTRACAUDAL; PERINEURAL at 08:35

## 2022-03-24 RX ADMIN — DEXAMETHASONE SODIUM PHOSPHATE 4 MG: 4 INJECTION, SOLUTION INTRA-ARTICULAR; INTRALESIONAL; INTRAMUSCULAR; INTRAVENOUS; SOFT TISSUE at 08:35

## 2022-03-24 RX ADMIN — ONDANSETRON 4 MG: 2 INJECTION INTRAMUSCULAR; INTRAVENOUS at 10:16

## 2022-03-24 RX ADMIN — MIDAZOLAM HYDROCHLORIDE 2 MG: 2 INJECTION, SOLUTION INTRAMUSCULAR; INTRAVENOUS at 09:55

## 2022-03-24 RX ADMIN — CELECOXIB 400 MG: 200 CAPSULE ORAL at 07:06

## 2022-03-24 RX ADMIN — LIDOCAINE HYDROCHLORIDE 100 MG: 20 INJECTION, SOLUTION EPIDURAL; INFILTRATION; INTRACAUDAL; PERINEURAL at 10:06

## 2022-03-24 RX ADMIN — FENTANYL CITRATE 50 MCG: 50 INJECTION, SOLUTION INTRAMUSCULAR; INTRAVENOUS at 10:18

## 2022-03-24 RX ADMIN — SODIUM CHLORIDE, POTASSIUM CHLORIDE, SODIUM LACTATE AND CALCIUM CHLORIDE: 600; 310; 30; 20 INJECTION, SOLUTION INTRAVENOUS at 10:00

## 2022-03-24 RX ADMIN — CEFAZOLIN SODIUM 2 G: 1 INJECTION, POWDER, FOR SOLUTION INTRAMUSCULAR; INTRAVENOUS at 10:09

## 2022-03-24 RX ADMIN — PROPOFOL 200 MG: 10 INJECTION, EMULSION INTRAVENOUS at 09:55

## 2022-03-24 RX ADMIN — ACETAMINOPHEN 650 MG: 325 TABLET ORAL at 07:07

## 2022-03-24 RX ADMIN — FENTANYL CITRATE 50 MCG: 50 INJECTION, SOLUTION INTRAMUSCULAR; INTRAVENOUS at 09:55

## 2022-03-24 NOTE — ANESTHESIA PREPROCEDURE EVALUATION
Anesthetic History   No history of anesthetic complications            Review of Systems / Medical History  Patient summary reviewed, nursing notes reviewed and pertinent labs reviewed    Pulmonary        Sleep apnea    Asthma        Neuro/Psych         Psychiatric history     Cardiovascular    Hypertension          Hyperlipidemia    Exercise tolerance: >4 METS     GI/Hepatic/Renal     GERD           Endo/Other    Diabetes: type 2  Hypothyroidism  Morbid obesity, arthritis and cancer     Other Findings            Past Medical History:   Diagnosis Date    Anxiety     Asthma     Dr. Dawson Washburn Benign essential hypertension 3/24/15    PIH    BMI 36.0-36.9,adult     Endometriosis     GERD (gastroesophageal reflux disease)     History of prediabetes     resolved with diet, exercise and wt loss    Hypertriglyceridemia 3/28/15    Hypothyroid     treated with Levothyroxine x3 months then stopped. TFT have been normal since    In vitro fertilization     x2    Kidney stones     Miscarriage     x2    Ovarian cyst     Psychiatric disorder     ANXIETY AND DEPRESSION    QT prolongation     d/t Trazodone; resolved after Trazodone discontinued    Rectal carcinoid tumor 2014    stage IIIB; colonic resection, lymph nodes removed, ileostomy 10/2014 (Dr. Jaky Garcia); ileostomy take-down 2/3/15. No chemo/XRT.  Sees primarily [facility]. Kate Duval at [facility]. Also sees Dr. Chanrda Hernandez here if needed    Septic shock(785.52) [phone]    d/t complications from [facility] , laparoscopy - bowel was lacerated, exploratory surgery    Sleep apnea     no CPAP    Uterine polyp     Vitamin D deficiency 3/28/15       Past Surgical History:   Procedure Laterality Date    HX  SECTION  2020    HX  SECTION  2019    HX GI  2/3/15    ileostomy take-down (Dr. Jaky Garcia)    HX GYN      laproscopy and D&C - bowel knicked thus had full exploratory    HX GYN      IVF x 2    HX GYN      A2 (miscarriage)    HX HERNIA REPAIR  2005    x 2    HX OTHER SURGICAL  8/10/15    incisional hernia repair with mesh, lysis of adhesions (Dr. Kirstie Nathan)    HX POLYPECTOMY  7/2014 (x2)    2 rectal polyps (Dr. Yoni Loco, Dr. oRmulo Kern)    HX TOTAL COLECTOMY  10/2014    rectosigmoid colon resection, ileostomy (Dr. Kirstei Nathan)    MO CHEST SURGERY PROCEDURE UNLISTED  2001    TAPPED CHEST AND ABD       Current Outpatient Medications   Medication Instructions    DULoxetine (CYMBALTA) 60 mg capsule TAKE ONE CAPSULE BY MOUTH ONE TIME DAILY    ketorolac (TORADOL) 10 mg, Oral, EVERY 6 HOURS AS NEEDED    omeprazole (PRILOSEC) 20 mg, Oral, DAILY    oxybutynin (DITROPAN) 5 mg, Oral, 3 TIMES DAILY AS NEEDED    phentermine (ADIPEX_P) 15 mg capsule Oral, 7AM    tamsulosin (FLOMAX) 0.4 mg, Oral, DAILY       Current Facility-Administered Medications   Medication Dose Route Frequency    lactated Ringers infusion  20 mL/hr IntraVENous CONTINUOUS    ceFAZolin (ANCEF) 2 g in sterile water (preservative free) 20 mL IV syringe  2 g IntraVENous ONCE       Patient Vitals for the past 24 hrs:   Temp Pulse Resp BP SpO2   03/24/22 0713 -- -- -- -- 99 %   03/24/22 0650 36.6 °C (97.9 °F) 68 18 119/80 --       Lab Results   Component Value Date/Time    WBC 7.5 03/11/2022 08:15 AM    Hemoglobin (POC) 15.3 03/07/2016 02:08 PM    HGB 12.8 03/11/2022 08:15 AM    Hematocrit (POC) 45 03/07/2016 02:08 PM    HCT 40.5 03/11/2022 08:15 AM    PLATELET 829 64/94/3823 08:15 AM    MCV 88.6 03/11/2022 08:15 AM     Lab Results   Component Value Date/Time    Sodium 138 03/11/2022 08:15 AM    Potassium 4.2 03/11/2022 08:15 AM    Chloride 107 03/11/2022 08:15 AM    CO2 26 03/11/2022 08:15 AM    Anion gap 5 03/11/2022 08:15 AM    Glucose 109 (H) 03/11/2022 08:15 AM    BUN 18 03/11/2022 08:15 AM    Creatinine 0.66 03/11/2022 08:15 AM    BUN/Creatinine ratio 27 (H) 03/11/2022 08:15 AM    GFR est AA >60 03/11/2022 08:15 AM    GFR est non-AA >60 03/11/2022 08:15 AM    Calcium 8.7 03/11/2022 08:15 AM     No results found for: APTT, PTP, INR, INREXT  Lab Results   Component Value Date/Time    Glucose 109 (H) 03/11/2022 08:15 AM    Glucose (POC) 106 (H) 03/07/2016 02:08 PM    Glucose (POC) 113 (H) 08/10/2015 07:06 AM     Physical Exam    Airway  Mallampati: II  TM Distance: 4 - 6 cm  Neck ROM: normal range of motion   Mouth opening: Normal     Cardiovascular  Regular rate and rhythm,  S1 and S2 normal,  no murmur, click, rub, or gallop             Dental    Dentition: Upper dentition intact and Lower dentition intact     Pulmonary  Breath sounds clear to auscultation               Abdominal  GI exam deferred       Other Findings            Anesthetic Plan    ASA: 3  Anesthesia type: general      Post-op pain plan if not by surgeon: regional and peripheral nerve block single    Induction: Intravenous  Anesthetic plan and risks discussed with: Patient and Family

## 2022-03-24 NOTE — ANESTHESIA PROCEDURE NOTES
Peripheral Block    Start time: 3/24/2022 8:30 AM  End time: 3/24/2022 8:45 AM  Performed by: Rajesh Camilo MD  Authorized by: Rajesh Camilo MD       Pre-procedure:    Indications: at surgeon's request, post-op pain management and procedure for pain    Preanesthetic Checklist: patient identified, risks and benefits discussed, site marked, timeout performed, anesthesia consent given and patient being monitored    Timeout Time: 08:35 EDT          Block Type:   Block Type:  Brachial plexus  Laterality:  Left  Monitoring:  Standard ASA monitoring, responsive to questions, oxygen, continuous pulse ox, frequent vital sign checks and heart rate  Injection Technique:  Single shot  Procedures: ultrasound guided    Patient Position: supine  Prep: chlorhexidine    Needle Type:  Pajunk  Needle Gauge:  21 G  Needle Localization:  Ultrasound guidance  Medication Injected:  Bupivacaine (PF) (MARCAINE) 0.5% injection, 20 mL  dexamethasone (DECADRON) 4 mg/mL injection, 4 mg  Med Admin Time: 3/24/2022 8:35 AM    Assessment:  Number of attempts:  1  Injection Assessment:  Incremental injection every 5 mL, negative aspiration for CSF, no paresthesia, no intravascular symptoms, negative aspiration for blood and local visualized surrounding nerve on ultrasound  Patient tolerance:  Patient tolerated the procedure well with no immediate complications

## 2022-03-24 NOTE — BRIEF OP NOTE
Brief Postoperative Note    Patient: Sadaf Damon  YOB: 1980  MRN: [id number]    Date of Procedure: 3/24/2022     Pre-Op Diagnosis: Left lateral epicondylitis [M77.12]    Post-Op Diagnosis: Same as preoperative diagnosis. Procedure(s):  LEFT ELBOW LATERAL EPICONDYLE RELEASE (GENERAL/BLOCK)    Surgeon(s):  Guy Elkins MD    Surgical Assistant: Surg Asst-1: Franklyn Delgado    Anesthesia: General     Estimated Blood Loss (mL): < 25 cc    Complications: None    Specimens: * No specimens in log *     Implants:   Implant Name Type Inv.  Item Serial No.  Lot No. LRB No. Used Action   ANCHOR SUT BIOCOMP 3X14.5MM --  - [id number]  ANCHOR SUT BIOCOMP 3X14.5MM --  [id number] NA Left 1 Implanted       Drains: * No LDAs found *    Findings: chronic degenerative tissue at ECRB attachment    Electronically Signed by Etta Lo MD on 3/24/2022 at 11:12 AM

## 2022-03-24 NOTE — ANESTHESIA POSTPROCEDURE EVALUATION
Procedure(s):  LEFT ELBOW LATERAL EPICONDYLE RELEASE (GENERAL/BLOCK).     general, regional    Anesthesia Post Evaluation      Multimodal analgesia: multimodal analgesia used between 6 hours prior to anesthesia start to PACU discharge  Patient location during evaluation: PACU  Patient participation: complete - patient participated  Level of consciousness: awake  Pain score: 0  Pain management: adequate  Airway patency: patent  Anesthetic complications: no  Cardiovascular status: acceptable  Respiratory status: acceptable  Hydration status: acceptable  Post anesthesia nausea and vomiting:  controlled  Final Post Anesthesia Temperature Assessment:  Normothermia (36.0-37.5 degrees C)      INITIAL Post-op Vital signs:   Vitals Value Taken Time   /68 03/24/22 1145   Temp 37 °C (98.6 °F) 03/24/22 1130   Pulse 75 03/24/22 1145   Resp 14 03/24/22 1145   SpO2 93 % 03/24/22 1145

## 2022-03-25 NOTE — OP NOTES
[facility]  OPERATIVE REPORT    Name:  Reyes Barahona  MR#:  [id number]  :  1980  ACCOUNT #:  [de-identified]  DATE OF SERVICE:  2022    PREOPERATIVE DIAGNOSIS:  Left elbow lateral epicondylitis. POSTOPERATIVE DIAGNOSIS:  Left elbow lateral epicondylitis. PROCEDURE PERFORMED:  Left elbow lateral epicondyle release. SURGEON:  Meche Yi MD    ASSISTANT:      ANESTHESIA:  General and regional.    ANESTHESIOLOGIST:  Dr. Geri Ramsey:  None. SPECIMENS REMOVED:  None. IMPLANTS:  Arthrex suture anchor x1. ESTIMATED BLOOD LOSS:  Less than 25 mL. TOURNIQUET TIME:  42 minutes. INDICATIONS:  The patient is a 79-year-old female with a long history of left elbow pain secondary to lateral epicondylitis. She had undergone extensive nonsurgical treatment without significant success. Treatment options were discussed with the patient. Additionally, the pros and cons of surgical versus nonsurgical care were discussed. These included, but not limited to anesthesia and any medication administered therein, bleeding, infection, neurovascular damage, DVT/PE, persistent pain and stiffness, as well as failure of the procedure. She understood these issues and elected to proceed. PROCEDURE:  Informed and written consent was obtained prior to the procedure. The appropriate extremity was marked in the holding area. Preoperative antibiotics were administered intravenously. Regional anesthesia was administered by the anesthesia team.    The patient was then taken to the operating room and placed supine on the operating table with the left arm on an arm board. General anesthesia was introduced. A tourniquet was placed on the left upper arm. The left arm was then prepped and draped in a sterile manner. A time-out was called. The arm was exsanguinated with the Esmarch, and the arm tourniquet was inflated to 275 mmHg.   A 4-cm incision was made along the elbow just anterior to the lateral epicondyle. Dissection was carried down to the underlying subcutaneous tissue to the level of the epicondyle and the elbow joint. The interval between the [facility] and the ECRL was then split sharply and sequentially down to the level of the bone. The attachment of the ECRB was then identified. This tissue was noted to be degenerated. It had the classic appearance of the angiodysplastic tissue, seen with lateral epicondylitis. The attachment was then debrided. The debriement was extended distally until some more normal tissue was encountered. A portion of the joint capsule was also resected as a result. The appearance of the joint appeared normal.  In addition, the LCL attachment appeared to be normal.    The surgery site was then irrigated. The area of the capitellum and the lateral epicondyle were then debrided with a rongeur to create a freshened attachment on the bone. A 3-mm suture anchor was then placed into the bony site. The suture was then weaved through the ECRL and the [facility] using a continuous locking suture pattern. This was then tied at the origin of the anchor. Additional irrigation was then performed. The remainder of the fascia as well as the deep subcutaneous tissue were then closed with interrupted sutures of #1 Vicryl. The superficial subcutaneous layer was closed with interrupted sutures of 2-0 Vicryl. The skin was closed with running subcuticular 4-0 Monocryl. The wound was then cleaned, dried, and dressed with Prineo with Dermabond. From there, the wound was dressed with 4x4s and sterile cast padding. A long arm posterior splint was then applied, and this was secured with additional cast padding and Ace wraps. The arm was also placed in a sling. Tourniquet was released. There was good return of color to the hand. The patient was then awakened and extubated in the operating room.   She was then transferred to a hospital bed and taken to the PACU in stable condition. Sponge, needle, and instrument counts were correct at the end of the case.       Miguelito Youssef MD      [email or web address]  D:  03/24/2022 12:06  T:  03/24/2022 22:34  JOB #:  [id number]  CC:  Sendy Ferrara MD

## 2022-03-25 NOTE — ROUTINE PROCESS
Contacted pt for post-op follow up. Pt stated she had block approx 24 hrs ago and only has tingling sensation in fingers rest of arm is still numb, additionally pt stated her fingers are a bit swollen although they do have cap refill. She has followed going-home instructions regarding her pain control supplemented with ice packs. Contacted CRNA and relayed the information to Dr. Mariia Nath.

## 2022-06-16 ENCOUNTER — TRANSCRIBE ORDER (OUTPATIENT)
Dept: SCHEDULING | Age: 42
End: 2022-06-16

## 2022-06-16 DIAGNOSIS — D3A.8 NEUROENDOCRINE TUMOR: Primary | ICD-10-CM

## 2022-06-20 ENCOUNTER — HOSPITAL ENCOUNTER (OUTPATIENT)
Dept: CT IMAGING | Age: 42
Discharge: HOME | End: 2022-06-20
Payer: COMMERCIAL

## 2022-06-20 DIAGNOSIS — D3A.8 NEUROENDOCRINE TUMOR: ICD-10-CM

## 2022-06-20 PROCEDURE — 74177 CT ABD & PELVIS W/CONTRAST: CPT

## 2022-06-20 PROCEDURE — 71260 CT THORAX DX C+: CPT

## 2022-06-20 PROCEDURE — 74011000636 HC RX REV CODE- 636

## 2022-06-20 RX ADMIN — IOPAMIDOL 100 ML: 755 INJECTION, SOLUTION INTRAVENOUS at 09:29

## 2022-08-22 ENCOUNTER — TELEPHONE (OUTPATIENT)
Dept: INTERNAL MEDICINE CLINIC | Age: 42
End: 2022-08-22

## 2022-08-22 DIAGNOSIS — D50.9 IRON DEFICIENCY ANEMIA, UNSPECIFIED IRON DEFICIENCY ANEMIA TYPE: Primary | ICD-10-CM

## 2022-08-22 NOTE — TELEPHONE ENCOUNTER
Patient is calling to report restless leg syndrome, hands and feet cold, extreme fatigue. She feels that her iron absorption rate is low. She had transfusion within the last 6 months. Would like to have labs drawn to measure iron levels prior to her upcoming appointment. Please call patient to let her know when labs are ordered and with advise regarding her symptoms.

## 2022-08-23 NOTE — TELEPHONE ENCOUNTER
Labs ordered for [facility].  Have done today or tomrrow and then we can come up with a plan. Please call and let her know.

## 2022-08-24 NOTE — TELEPHONE ENCOUNTER
PT stated she had labs done for her oncologist and she will be receiving the labs any day now and she will bring them in on her apt visit on August 30th.

## 2022-08-30 PROBLEM — E83.110 HEREDITARY HEMOCHROMATOSIS (HCC): Status: ACTIVE | Noted: 2022-08-30

## 2022-10-29 ENCOUNTER — HOSPITAL ENCOUNTER (EMERGENCY)
Age: 42
Discharge: HOME | End: 2022-10-29
Attending: EMERGENCY MEDICINE
Payer: COMMERCIAL

## 2022-10-29 VITALS
TEMPERATURE: 97.6 F | RESPIRATION RATE: 20 BRPM | SYSTOLIC BLOOD PRESSURE: 134 MMHG | OXYGEN SATURATION: 97 % | HEIGHT: 71 IN | WEIGHT: 280 LBS | DIASTOLIC BLOOD PRESSURE: 80 MMHG | BODY MASS INDEX: 39.2 KG/M2 | HEART RATE: 81 BPM

## 2022-10-29 DIAGNOSIS — H66.001 NON-RECURRENT ACUTE SUPPURATIVE OTITIS MEDIA OF RIGHT EAR WITHOUT SPONTANEOUS RUPTURE OF TYMPANIC MEMBRANE: Primary | ICD-10-CM

## 2022-10-29 DIAGNOSIS — J01.90 ACUTE SINUSITIS, RECURRENCE NOT SPECIFIED, UNSPECIFIED LOCATION: ICD-10-CM

## 2022-10-29 PROCEDURE — 99283 EMERGENCY DEPT VISIT LOW MDM: CPT

## 2022-10-29 PROCEDURE — 74011250637 HC RX REV CODE- 250/637: Performed by: EMERGENCY MEDICINE

## 2022-10-29 RX ORDER — CETIRIZINE HYDROCHLORIDE 10 MG/1
10 CAPSULE, LIQUID FILLED ORAL DAILY
Qty: 30 CAPSULE | Refills: 0 | Status: SHIPPED | OUTPATIENT
Start: 2022-10-29

## 2022-10-29 RX ORDER — PSEUDOEPHEDRINE HCL 30 MG
30 TABLET ORAL
Qty: 15 TABLET | Refills: 0 | Status: SHIPPED | OUTPATIENT
Start: 2022-10-29 | End: 2022-11-02

## 2022-10-29 RX ORDER — PSEUDOEPHEDRINE HCL 30 MG
30 TABLET ORAL
Qty: 15 TABLET | Refills: 0 | Status: SHIPPED | OUTPATIENT
Start: 2022-10-29 | End: 2022-10-29 | Stop reason: SDUPTHER

## 2022-10-29 RX ORDER — GUAIFENESIN 1200 MG/1
1200 TABLET, EXTENDED RELEASE ORAL 2 TIMES DAILY
Qty: 10 TABLET | Refills: 0 | Status: SHIPPED | OUTPATIENT
Start: 2022-10-29

## 2022-10-29 RX ORDER — IBUPROFEN 800 MG/1
800 TABLET ORAL
Qty: 30 TABLET | Refills: 0 | Status: SHIPPED | OUTPATIENT
Start: 2022-10-29 | End: 2022-10-29 | Stop reason: SDUPTHER

## 2022-10-29 RX ORDER — FLUTICASONE PROPIONATE 50 MCG
2 SPRAY, SUSPENSION (ML) NASAL DAILY
Qty: 1 EACH | Refills: 0 | Status: SHIPPED | OUTPATIENT
Start: 2022-10-29

## 2022-10-29 RX ORDER — IBUPROFEN 800 MG/1
800 TABLET ORAL
Status: COMPLETED | OUTPATIENT
Start: 2022-10-29 | End: 2022-10-29

## 2022-10-29 RX ORDER — IBUPROFEN 800 MG/1
800 TABLET ORAL
Qty: 30 TABLET | Refills: 0 | Status: SHIPPED | OUTPATIENT
Start: 2022-10-29

## 2022-10-29 RX ORDER — ACETAMINOPHEN 500 MG
1000 TABLET ORAL 3 TIMES DAILY
Qty: 24 TABLET | Refills: 0 | Status: SHIPPED | OUTPATIENT
Start: 2022-10-29 | End: 2022-11-02

## 2022-10-29 RX ORDER — AMOXICILLIN AND CLAVULANATE POTASSIUM 875; 125 MG/1; MG/1
1 TABLET, FILM COATED ORAL
Status: COMPLETED | OUTPATIENT
Start: 2022-10-29 | End: 2022-10-29

## 2022-10-29 RX ORDER — GUAIFENESIN 1200 MG/1
1200 TABLET, EXTENDED RELEASE ORAL 2 TIMES DAILY
Qty: 10 TABLET | Refills: 0 | Status: SHIPPED | OUTPATIENT
Start: 2022-10-29 | End: 2022-10-29 | Stop reason: SDUPTHER

## 2022-10-29 RX ORDER — AMOXICILLIN AND CLAVULANATE POTASSIUM 875; 125 MG/1; MG/1
1 TABLET, FILM COATED ORAL 2 TIMES DAILY
Qty: 20 TABLET | Refills: 0 | Status: SHIPPED | OUTPATIENT
Start: 2022-10-29 | End: 2022-10-29 | Stop reason: SDUPTHER

## 2022-10-29 RX ORDER — CETIRIZINE HYDROCHLORIDE 10 MG/1
10 CAPSULE, LIQUID FILLED ORAL DAILY
Qty: 30 CAPSULE | Refills: 0 | Status: SHIPPED | OUTPATIENT
Start: 2022-10-29 | End: 2022-10-29 | Stop reason: SDUPTHER

## 2022-10-29 RX ORDER — FLUTICASONE PROPIONATE 50 MCG
2 SPRAY, SUSPENSION (ML) NASAL DAILY
Qty: 1 EACH | Refills: 0 | Status: SHIPPED | OUTPATIENT
Start: 2022-10-29 | End: 2022-10-29 | Stop reason: SDUPTHER

## 2022-10-29 RX ORDER — ACETAMINOPHEN 500 MG
1000 TABLET ORAL
Status: COMPLETED | OUTPATIENT
Start: 2022-10-29 | End: 2022-10-29

## 2022-10-29 RX ORDER — AMOXICILLIN AND CLAVULANATE POTASSIUM 875; 125 MG/1; MG/1
1 TABLET, FILM COATED ORAL 2 TIMES DAILY
Qty: 20 TABLET | Refills: 0 | Status: SHIPPED | OUTPATIENT
Start: 2022-10-29 | End: 2022-11-08

## 2022-10-29 RX ORDER — ACETAMINOPHEN 500 MG
1000 TABLET ORAL 3 TIMES DAILY
Qty: 24 TABLET | Refills: 0 | Status: SHIPPED | OUTPATIENT
Start: 2022-10-29 | End: 2022-10-29 | Stop reason: SDUPTHER

## 2022-10-29 RX ADMIN — ACETAMINOPHEN 1000 MG: 500 TABLET ORAL at 02:14

## 2022-10-29 RX ADMIN — IBUPROFEN 800 MG: 800 TABLET, FILM COATED ORAL at 02:14

## 2022-10-29 RX ADMIN — AMOXICILLIN AND CLAVULANATE POTASSIUM 1 TABLET: 875; 125 TABLET, FILM COATED ORAL at 02:14

## 2022-10-29 NOTE — ED TRIAGE NOTES
Patient to ED for R ear pain and R facial swelling/pain since last night. Patient also c/o sore throat, nasal congestion, rhinorrhea and cough x 8 days.  Patient reports taking Tylenol and Mucinex at 8pm.

## 2022-10-29 NOTE — ED PROVIDER NOTES
44-year-old female presenting to the ER with sinus pain with congestion and now having right ear pain. Reports subjective fevers. Was recently seen at an urgent care was tested negative for COVID and flu. Patient has history of sinus problems. Has been using Flonase and Mucinex. Reports sore throat. No cough or shortness of breath. Symptoms or not improving with over-the-counter treatments. Past Medical History:   Diagnosis Date    Anxiety     Asthma     Dr. Liuz Gale    Benign essential hypertension 2015    [address]    BMI 36.0-36.9,adult     Endometriosis     GERD (gastroesophageal reflux disease)     Hereditary hemochromatosis ([facility] [address]     History of prediabetes     resolved with diet, exercise and wt loss    Hypertriglyceridemia 2015    Hypothyroid     treated with Levothyroxine x3 months then stopped. TFT have been normal since    In vitro fertilization     x2    Kidney stones     Miscarriage     x2    Ovarian cyst     Psychiatric disorder     ANXIETY AND DEPRESSION    QT prolongation     d/t Trazodone; resolved after Trazodone discontinued    Rectal carcinoid tumor 2014    stage IIIB; colonic resection, lymph nodes removed, ileostomy 10/2014 (Dr. Lima Hua); ileostomy take-down 2/3/15. No chemo/XRT.  Sees primarily Dr. Frann Paget at [facility]. Also sees Dr. Roman Dash here if needed    Septic [phone]    d/t complications from [facility] , laparoscopy - bowel was lacerated, exploratory surgery    Sleep apnea     no CPAP    Uterine polyp     Vitamin D deficiency 2015       Past Surgical History:   Procedure Laterality Date    HX  SECTION  2020    HX  SECTION  2019    HX GI  2/3/15    ileostomy take-down (Dr. Lima Hua)    HX GYN      laproscopy and D&C - bowel knicked thus had full exploratory    HX GYN      IVF x 2    HX GYN      A2 (miscarriage)    HX HERNIA REPAIR  2005    x 2    HX OTHER SURGICAL  8/10/15    incisional hernia repair with mesh, lysis of adhesions (Dr. Moses Rizzo)    HX POLYPECTOMY  2014 (x2)    2 rectal polyps (Dr. Jocelyn Echavarria, Dr. Soha Costello)    HX TOTAL COLECTOMY  10/2014    rectosigmoid colon resection, ileostomy (Dr. Moses Rizzo)    [address] PROCEDURE UNLISTED      TAPPED CHEST AND ABD         Family History:   Problem Relation Age of Onset    Diabetes Mother     Hypertension Mother     Cancer Mother         uterine    Diabetes Father     Hypertension Father     Hypertension Brother     Attention Deficit Disorder Brother     Diabetes Maternal Aunt     Heart Disease Maternal Aunt     Heart Attack Maternal Aunt     Diabetes Maternal Uncle     Heart Disease Maternal Uncle     Heart Attack Maternal Uncle     Stroke Paternal Grandmother     Colon Cancer Paternal Grandfather     Hypertension Brother     Depression Brother         commited suicide    Cancer Maternal Aunt         colon     Cancer Maternal Aunt         lymphoma    No Known Problems Daughter     No Known Problems Son     Anesth Problems Neg Hx        Social History     Socioeconomic History    Marital status:      Spouse name: Not on file    Number of children: Not on file    Years of education: Not on file    Highest education level: Not on file   Occupational History    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 No children. Works at [employer].      Social Determinants of Health     Financial Resource Strain: Not on file   Food Insecurity: Not on file   Transportation Needs: Not on file   Physical Activity: Not on file   Stress: Not on file   Social Connections: Not on file   Intimate Partner Violence: Not on file   Housing Stability: Not on file         ALLERGIES: Adhesive and Trazodone    Review of Systems   Constitutional:  Positive for chills. Negative for fever. HENT:  Positive for ear pain, sinus pressure and sinus pain. Negative for congestion and sore throat. Eyes:  Negative for pain. Respiratory:  Negative for shortness of breath. Cardiovascular:  Negative for chest pain. Gastrointestinal:  Negative for abdominal pain, diarrhea, nausea and vomiting. Genitourinary:  Negative for dysuria and flank pain. Musculoskeletal:  Negative for back pain and neck pain. Skin:  Negative for rash. Neurological:  Negative for dizziness and headaches. All other systems reviewed and are negative. Vitals:    10/29/22 0139 10/29/22 0149   BP: 134/80    Pulse: 81    Resp: 20    Temp: 97.6 °F (36.4 °C)    SpO2: 94% 97%   Weight: 127 kg (280 lb)    Height: 5' 11\" (1.803 m)             Physical Exam  Constitutional:       Appearance: She is well-developed. HENT:      Head: Normocephalic. Right Ear: A middle ear effusion is present. Tympanic membrane is erythematous and bulging. Nose:      Right Sinus: Maxillary sinus tenderness and frontal sinus tenderness present. Left Sinus: Maxillary sinus tenderness and frontal sinus tenderness present. Mouth/Throat:      Pharynx: Oropharynx is clear. Eyes:      Conjunctiva/sclera: Conjunctivae normal.   Cardiovascular:      Rate and Rhythm: Normal rate and regular rhythm. Pulmonary:      Effort: Pulmonary effort is normal. No respiratory distress. Breath sounds: Normal breath sounds. Musculoskeletal:         General: Normal range of motion. Cervical back: Normal range of motion and neck supple. Skin:     General: Skin is warm.       Capillary Refill: Capillary refill takes less than 2 seconds. Findings: No rash. Neurological:      Mental Status: She is alert and oriented to person, place, and time. Comments: No gross motor or sensory deficits        MDM  Number of Diagnoses or Management Options  Acute sinusitis, recurrence not specified, unspecified location  Non-recurrent acute suppurative otitis media of right ear without spontaneous rupture of tympanic membrane  Diagnosis management comments: Patient with symptoms for the last 8 days of sinus pressure now having ear pain. Subjective fevers. On exam patient has signs of acute otitis media. In light of patient's sinus pain and pressure we will treat with Augmentin to treat for sinusitis and acute otitis media. Discussed symptomatic treatment for her nasal congestion with Sudafed and Mucinex Flonase and Zyrtec.            Procedures

## 2023-01-23 ENCOUNTER — PATIENT MESSAGE (OUTPATIENT)
Dept: INTERNAL MEDICINE CLINIC | Age: 43
End: 2023-01-23

## 2023-01-23 ENCOUNTER — OFFICE VISIT (OUTPATIENT)
Dept: INTERNAL MEDICINE CLINIC | Age: 43
End: 2023-01-23
Payer: COMMERCIAL

## 2023-01-23 VITALS
WEIGHT: 277 LBS | OXYGEN SATURATION: 98 % | RESPIRATION RATE: 14 BRPM | DIASTOLIC BLOOD PRESSURE: 64 MMHG | TEMPERATURE: 98.2 F | SYSTOLIC BLOOD PRESSURE: 112 MMHG | BODY MASS INDEX: 38.78 KG/M2 | HEART RATE: 66 BPM | HEIGHT: 71 IN

## 2023-01-23 DIAGNOSIS — F90.9 ATTENTION DEFICIT HYPERACTIVITY DISORDER (ADHD), UNSPECIFIED ADHD TYPE: Primary | ICD-10-CM

## 2023-01-23 NOTE — PROGRESS NOTES
Talita Posey is a 38 y.o. female  Chief Complaint   Patient presents with    Follow-up     Follow up on ADHD      Visit Vitals  /64 (BP 1 Location: Left upper arm, BP Patient Position: Sitting, BP Cuff Size: Adult)   Pulse 66   Temp 98.2 °F (36.8 °C) (Temporal)   Resp 14   Ht 5' 11\" (1.803 m)   Wt 277 lb (125.6 kg)   SpO2 98%   BMI 38.63 kg/m²      Health Maintenance Due   Topic Date Due    Hepatitis C Screening  Never done    Pneumococcal 0-64 years (1 - PCV) Never done    COVID-19 Vaccine (5 - Booster) 11/10/2021    Flu Vaccine (1) 08/01/2022       HPI  ADD follow up - saw [facility] for testing - dx with ADHD. Dr. Ashly Benedict. Will request records. Has never taken stimulant meds for ADD, but did well with phentermine for weight loss in the past. On phentermine: denies side effects including palpitations, anxiety, & insomnia. Not using marijuana or other drugs. Had drug screen with Dr. Fredo Crow. BP is controlled. BP Readings from Last 3 Encounters:   01/23/23 112/64   10/29/22 134/80   03/24/22 122/69       PDMP Review WNL: Last PDMP Brad Trivedi as Reviewed:  Review User Review Instant Review Result   KAREN ALMONTE 1/23/2023  8:54 AM Reviewed PDMP [1]       ROS  Review of Systems   Respiratory:  Negative for shortness of breath. Cardiovascular:  Negative for chest pain and palpitations. Neurological:  Negative for dizziness, loss of consciousness and weakness. EXAM  Physical Exam  Vitals and nursing note reviewed. Constitutional:       General: She is not in acute distress. Appearance: She is well-developed. HENT:      Head: Normocephalic and atraumatic. Neck:      Vascular: No JVD. Cardiovascular:      Rate and Rhythm: Normal rate and regular rhythm. Heart sounds: Normal heart sounds. Pulmonary:      Effort: Pulmonary effort is normal. No respiratory distress. Breath sounds: Normal breath sounds. Musculoskeletal:      Cervical back: Neck supple.    Skin:     General: Skin is warm and dry. Neurological:      Mental Status: She is alert and oriented to person, place, and time. Psychiatric:         Mood and Affect: Mood normal.         Behavior: Behavior normal.         Thought Content: Thought content normal.         Judgment: Judgment normal.     ASSESSMENT/PLAN  Encounter Diagnoses     ICD-10-CM ICD-9-CM   1. Attention deficit hyperactivity disorder (ADHD), unspecified ADHD type  F90.9 314.01     Will request records and then Rx Adderall 10 mg BID. Discussed side effects including anxiety/palpitations/elevated BP.

## 2023-01-24 NOTE — TELEPHONE ENCOUNTER
From: Ofelia Henry  To: Roldan Brown PA-C  Sent: 1/23/2023 9:56 AM EST  Subject: Stacy Khoury Report    Please find attached

## 2023-01-27 LAB
ALPRAZ UR QL: NEGATIVE
AMPHETAMINES UR QL SCN: NEGATIVE NG/ML
BARBITURATES UR QL SCN: NEGATIVE NG/ML
BENZODIAZ UR QL: NEGATIVE NG/ML
BZE UR QL SCN: NEGATIVE NG/ML
CANNABINOIDS UR QL SCN: NEGATIVE NG/ML
CLONAZEPAM UR QL: NEGATIVE
CREAT UR-MCNC: 40.8 MG/DL (ref 20–300)
FLURAZEPAM UR QL: NEGATIVE
LORAZEPAM UR QL: NEGATIVE
METHADONE UR QL SCN: NEGATIVE NG/ML
MIDAZOLAM UR QL CFM: NEGATIVE
NORDIAZEPAM UR QL: NEGATIVE
OPIATES UR QL SCN: NEGATIVE NG/ML
OXAZEPAM UR QL: NEGATIVE
OXYCODONE+OXYMORPHONE UR QL SCN: NEGATIVE NG/ML
PCP UR QL: NEGATIVE NG/ML
PH UR: 7.3 [PH] (ref 4.5–8.9)
PLEASE NOTE:, 733157: NORMAL
PROPOXYPH UR QL SCN: NEGATIVE NG/ML
SP GR UR: 1.01
TEMAZEPAM UR QL CFM: NEGATIVE
TRIAZOLAM UR QL: NEGATIVE

## 2023-01-30 ENCOUNTER — PATIENT MESSAGE (OUTPATIENT)
Dept: INTERNAL MEDICINE CLINIC | Age: 43
End: 2023-01-30

## 2023-01-30 ENCOUNTER — TELEPHONE (OUTPATIENT)
Dept: INTERNAL MEDICINE CLINIC | Age: 43
End: 2023-01-30

## 2023-01-30 DIAGNOSIS — F90.9 ATTENTION DEFICIT HYPERACTIVITY DISORDER (ADHD), UNSPECIFIED ADHD TYPE: Primary | ICD-10-CM

## 2023-01-30 RX ORDER — DEXTROAMPHETAMINE SACCHARATE, AMPHETAMINE ASPARTATE, DEXTROAMPHETAMINE SULFATE AND AMPHETAMINE SULFATE 2.5; 2.5; 2.5; 2.5 MG/1; MG/1; MG/1; MG/1
10 TABLET ORAL 2 TIMES DAILY
Qty: 60 TABLET | Refills: 0 | Status: SHIPPED | OUTPATIENT
Start: 2023-01-30

## 2023-01-30 NOTE — TELEPHONE ENCOUNTER
1 month Rx sent.  Follow up in 2-4 weeks in clinic or virtually if she has a BP cuff on an afternoon that she's taken both doses of Adderall

## 2023-01-30 NOTE — TELEPHONE ENCOUNTER
Calling to check on her medication being sent to the pharmacy. Her urine drug screening results are back.

## 2023-02-12 NOTE — PROGRESS NOTES
Carmelita Boggs, who was evaluated through a synchronous (real-time) audio-video encounter, and/or her healthcare decision maker, is aware that it is a billable service, which includes applicable co-pays, with coverage as determined by her insurance carrier. She provided verbal consent to proceed and patient identification was verified. This visit was conducted pursuant to the emergency declaration under the [facility], [address] [place] [address] and the [employer] General Act. A caregiver was present when appropriate. Ability to conduct physical exam was limited. The patient was located at: Home: [place] [facility] [id number]  The provider was located at: Home: Thang Huddleston MD on 2/12/2023 at 1:02 PM     [facility]  Carmelita Boggs is a 38 y.o. female. HPI  Here for ADD follow up. Started on Adderall 1/23/23 by TRISTAN Cruz after appropriate testing confirmed diagnosis. At first the medication made her sleepy. Forgetfulness, focus, and organization is better. Wonders about increasing medication.  monitoring reviewed and normal.  UDS checked last month. Not feeling well this week. Children sick first.  Lots of congestion, sinus pressure. Day 8. Mucous is yellow and green. Taking Mucinex and Nasocort. Kids and her were COVID negative. No fevers. Dry cough. Hoarseness. Current Outpatient Medications   Medication Instructions    dextroamphetamine-amphetamine (ADDERALL) 10 mg tablet 10 mg, Oral, 2 TIMES DAILY    DULoxetine (CYMBALTA) 60 mg capsule TAKE ONE CAPSULE BY MOUTH ONE TIME DAILY    omeprazole (PRILOSEC) 20 mg, Oral, DAILY    ZyrTEC 10 mg, Oral, DAILY       Patient-Reported Vitals 8/30/2022   Patient-Reported Weight 280lb   Patient-Reported Height -   Patient-Reported LMP -        ROS  See above  Physical Exam  Vitals reviewed.    Constitutional: Appearance: Normal appearance. HENT:      Head: Normocephalic and atraumatic. Mouth/Throat:      Comments: Hoarse, nasal voice  Neck:      Comments: Nml appearance  Pulmonary:      Effort: Pulmonary effort is normal.      Comments: Nml rate  Neurological:      Mental Status: She is alert and oriented to person, place, and time. ASSESSMENT and PLAN  Diagnoses and all orders for this visit:    1. Acute non-recurrent maxillary sinusitis - continue mucinex. Abx and steriods. -     amoxicillin-clavulanate (AUGMENTIN) 875-125 mg per tablet; Take 1 Tablet by mouth every twelve (12) hours for 10 days. -     methylPREDNISolone (MEDROL DOSEPACK) 4 mg tablet; Take as directed    2. Attention deficit hyperactivity disorder (ADHD), unspecified ADHD type -  reviewed and acceptable. Increase dose to 15mg bid.    -     dextroamphetamine-amphetamine (ADDERALL) 15 mg tablet; Take 1 Tablet by mouth two (2) times a day. Max Daily Amount: 30 mg.  -     10-DRUG SCREEN W/CONF; Future          Follow-up and Dispositions    Return in about 4 weeks (around 3/13/2023) for ADD.

## 2023-02-13 ENCOUNTER — VIRTUAL VISIT (OUTPATIENT)
Dept: INTERNAL MEDICINE CLINIC | Age: 43
End: 2023-02-13
Payer: COMMERCIAL

## 2023-02-13 DIAGNOSIS — J01.00 ACUTE NON-RECURRENT MAXILLARY SINUSITIS: Primary | ICD-10-CM

## 2023-02-13 DIAGNOSIS — F90.9 ATTENTION DEFICIT HYPERACTIVITY DISORDER (ADHD), UNSPECIFIED ADHD TYPE: ICD-10-CM

## 2023-02-13 PROCEDURE — 99214 OFFICE O/P EST MOD 30 MIN: CPT | Performed by: INTERNAL MEDICINE

## 2023-02-13 RX ORDER — AMOXICILLIN AND CLAVULANATE POTASSIUM 875; 125 MG/1; MG/1
1 TABLET, FILM COATED ORAL EVERY 12 HOURS
Qty: 20 TABLET | Refills: 0 | Status: SHIPPED | OUTPATIENT
Start: 2023-02-13 | End: 2023-02-23

## 2023-02-13 RX ORDER — METHYLPREDNISOLONE 4 MG/1
TABLET ORAL
Qty: 1 DOSE PACK | Refills: 0 | Status: SHIPPED | OUTPATIENT
Start: 2023-02-13

## 2023-02-13 RX ORDER — DEXTROAMPHETAMINE SACCHARATE, AMPHETAMINE ASPARTATE, DEXTROAMPHETAMINE SULFATE AND AMPHETAMINE SULFATE 3.75; 3.75; 3.75; 3.75 MG/1; MG/1; MG/1; MG/1
15 TABLET ORAL 2 TIMES DAILY
Qty: 60 TABLET | Refills: 0 | Status: SHIPPED | OUTPATIENT
Start: 2023-02-13

## 2023-02-17 DIAGNOSIS — F90.9 ATTENTION DEFICIT HYPERACTIVITY DISORDER (ADHD), UNSPECIFIED ADHD TYPE: ICD-10-CM

## 2023-02-17 LAB
COMMENT, HOLDF: NORMAL
SAMPLES BEING HELD,HOLD: NORMAL

## 2023-02-25 LAB
AMPHETAMINE (GC/MS), 737687: 2867 NG/ML
AMPHETAMINE, 737686: POSITIVE
AMPHETAMINES UR QL SCN: NORMAL NG/ML
AMPHETAMINES, 737685: POSITIVE
BARBITURATES UR QL SCN: NEGATIVE NG/ML
BENZODIAZ UR QL SCN: NEGATIVE NG/ML
BZE UR QL SCN: NEGATIVE NG/ML
CANNABINOIDS UR QL SCN: NEGATIVE NG/ML
CREAT UR-MCNC: 27.9 MG/DL (ref 20–300)
LABORATORY COMMENT REPORT: NORMAL
METHADONE UR QL SCN: NEGATIVE NG/ML
METHAMPHETAMINE, 737688: NEGATIVE
OPIATES UR QL SCN: NEGATIVE NG/ML
OXYCODONE+OXYMORPHONE UR QL SCN: NEGATIVE NG/ML
PCP UR QL: NEGATIVE NG/ML
PH UR: 5.8 (ref 4.5–8.9)
PROPOXYPH UR QL SCN: NEGATIVE NG/ML

## 2023-03-27 ENCOUNTER — VIRTUAL VISIT (OUTPATIENT)
Dept: INTERNAL MEDICINE CLINIC | Age: 43
End: 2023-03-27
Payer: COMMERCIAL

## 2023-03-27 DIAGNOSIS — J01.10 ACUTE NON-RECURRENT FRONTAL SINUSITIS: Primary | ICD-10-CM

## 2023-03-27 PROCEDURE — 99213 OFFICE O/P EST LOW 20 MIN: CPT | Performed by: INTERNAL MEDICINE

## 2023-03-27 RX ORDER — MULTIVITAMIN
1 TABLET ORAL
COMMUNITY

## 2023-03-27 RX ORDER — AMOXICILLIN AND CLAVULANATE POTASSIUM 875; 125 MG/1; MG/1
1 TABLET, FILM COATED ORAL 2 TIMES DAILY
Qty: 14 TABLET | Refills: 0 | Status: SHIPPED | OUTPATIENT
Start: 2023-03-27 | End: 2023-04-03

## 2023-03-27 NOTE — LETTER
NOTIFICATION RETURN TO WORK / SCHOOL    3/27/2023 8:32 AM    Ms. Meenu Arcos  [address]  [facility] [id number]      To Whom It May Concern:    Meenu Arcos is currently under the care of [facility]. She will return to work/school on: 3/29/2023  If there are questions or concerns please have the patient contact our office.         Sincerely,      Lawernce Lesches, MD

## 2023-03-27 NOTE — PROGRESS NOTES
Assessment/Plan:   1. Acute non-recurrent frontal sinusitis  -     amoxicillin-clavulanate (AUGMENTIN) 875-125 mg per tablet; Take 1 Tablet by mouth two (2) times a day for 7 days. , Normal, Disp-14 Tablet, R-0  The above diagnosis is a new problem. We discussed expected course, resolution, and complications of diagnosis in detail. Return if symptoms worsen or fail to improve. Subjective:   Andrews Tuttle presents for evaluation of URI     This started 10 days ago, and is gradually worsening since that time. She also reports URI symptoms, fatigue, myalgias, headache, bilateral sinus pain, sinus congestion, rhinorrhea, post nasal drip, sore throat, hoarseness, and productive cough. She denies a history of: fever and swollen glands. Treatments have included: decongestants. Relevant PMH: No pertinent additional PMH. 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 The patient (or guardian if applicable) is aware that this is a billable service. Verbal consent to proceed has been obtained within the past 12 months. The visit was conducted pursuant to the emergency declaration under the [address] [place] [address] authority and the [employer] General Act. Patient identification was verified, and a caregiver was present when appropriate. The patient was located in a state where the provider was credentialed to provide care. An electronic signature was used to authenticate this note.   -- Renetta Spencer MD

## 2023-03-27 NOTE — PROGRESS NOTES
Patient is having upper respiratory symptom, cough, congestion, sore throat, runny nose. No chief complaint on file. There were no vitals filed for this visit. Current Outpatient Medications on File Prior to Visit   Medication Sig Dispense Refill    dextroamphetamine-amphetamine (ADDERALL) 15 mg tablet Take 1 Tablet by mouth two (2) times a day. Max Daily Amount: 30 mg. 60 Tablet 0    Cetirizine (ZyrTEC) 10 mg cap Take 10 mg by mouth daily. (Patient taking differently: Take 10 mg by mouth daily as needed.) 30 Capsule 0    DULoxetine (CYMBALTA) 60 mg capsule TAKE ONE CAPSULE BY MOUTH ONE TIME DAILY 90 Capsule 3    omeprazole (PRILOSEC) 20 mg capsule Take 20 mg by mouth daily. multivitamin with folic acid (ONE DAILY WITH FOLIC ACID) 766 mcg tab tablet Take 1 Tablet by mouth. [DISCONTINUED] methylPREDNISolone (MEDROL DOSEPACK) 4 mg tablet Take as directed 1 Dose Pack 0     No current facility-administered medications on file prior to visit. Health Maintenance Due   Topic    Hepatitis C Screening     Varicella Vaccine (1 of 2 - 2-dose childhood series)    Pneumococcal 0-64 years (1 - PCV)    COVID-19 Vaccine (5 - Booster)    Flu Vaccine (1)    Depression Monitoring        1. \"Have you been to the ER, urgent care clinic since your last visit? Hospitalized since your last visit? \" No    2. \"Have you seen or consulted any other health care providers outside of the [address] [place] [address] since your last visit? \" No     3. For patients aged 39-70: Has the patient had a colonoscopy / FIT/ Cologuard? Yes - Care Gap present. Rooming MA/LPN to request most recent results Colorectal Specialist      If the patient is female:    4. For patients aged 41-77: Has the patient had a mammogram within the past 2 years? Yes - no Care Gap present [phone]      5. For patients aged 21-65: Has the patient had a pap smear?  Yes - no Care Gap present

## 2023-04-24 ENCOUNTER — TELEPHONE (OUTPATIENT)
Dept: INTERNAL MEDICINE CLINIC | Age: 43
End: 2023-04-24

## 2023-04-24 DIAGNOSIS — F90.9 ATTENTION DEFICIT HYPERACTIVITY DISORDER (ADHD), UNSPECIFIED ADHD TYPE: ICD-10-CM

## 2023-04-24 RX ORDER — DEXTROAMPHETAMINE SACCHARATE, AMPHETAMINE ASPARTATE, DEXTROAMPHETAMINE SULFATE AND AMPHETAMINE SULFATE 3.75; 3.75; 3.75; 3.75 MG/1; MG/1; MG/1; MG/1
15 TABLET ORAL 2 TIMES DAILY
Qty: 60 TABLET | Refills: 0 | Status: SHIPPED | OUTPATIENT
Start: 2023-04-24

## 2023-04-24 NOTE — TELEPHONE ENCOUNTER
----- Message from Carri Lazcano LPN sent at 1/31/3749 11:43 AM EDT -----  Regarding: FW: Refill request  Contact: [phone]    ----- Message -----  From: Gracia Chisholm  Sent: 4/24/2023  11:14 AM EDT  To: Giancarlo Conde Pool  Subject: Refill request                                   My Adderall does not have a refill you upped the dosage last month. Is it possible to send a refill to [facility]?  I'm up to date on my drug testing and I've seen you recently as well as Dr Glenn Blankenship in advance

## 2023-05-22 ENCOUNTER — TELEPHONE (OUTPATIENT)
Age: 43
End: 2023-05-22

## 2023-05-22 NOTE — TELEPHONE ENCOUNTER
Pt is requesting an appt with Dr. Stacey Hernandez. However, Dr. Stacey Hernandez has no availability until July, and pt cannot wait that long for this issue. Should pt schedule with another provider or be scheduled during lunch/after hours with Dr. Stacey Hernandez? Please forward to Dr. Stacey Hernandez so she can give her preference.

## 2023-05-23 NOTE — PROGRESS NOTES
Sudarshan Ureña, was evaluated through a synchronous (real-time) audio-video encounter. The patient (or guardian if applicable) is aware that this is a billable service, which includes applicable co-pays. This Virtual Visit was conducted with patient's (and/or legal guardian's) consent. Patient identification was verified, and a caregiver was present when appropriate. The patient was located at Home: [address]  Provider was located at [place] [address]): [address] [facility],  [address]         Total time spent for this encounter: Not billed by time    --Eran Pappas MD on 5/24/2023 at 12:59 PM    An electronic signature was used to authenticate this note. Subjective:      Patient ID: Sudarshan Ureña is a 38 y.o. female. HPI  Here to discuss obesity and weight loss. Hemochromatosis had been seeing oncologist in [place] and receiving iron infusion due to low iron from blood loss I menstruation. Has been > 2 months since last visit and has had 2 menses in the interim. .  Going back to [place] for scans and labs in June but may need to switch to someone locally -had allergic reaction  2 doses ago and had to switch type of iron product given. Increased fatigue but back to work and 2 children on the autistic spectrum. Wonders if iron levels are low. Weight is up 15 lbs. Has started again with meal preps. In the past A1C elevated but not to diabetic levels. Saw endocrinologist while pregnant for gestational diabetes - insulin diabetic. Mother, brother, grandparents with diabetes. Mother is on [facility] and has lost significant weight. Wonders about weight loss medication. Tries to exercise but difficult with kids and job. Current Outpatient Medications:     amphetamine-dextroamphetamine (ADDERALL) 15 MG tablet, Take 1 tablet by mouth 2 times daily. , Disp: , Rfl:     DULoxetine (CYMBALTA) 60 MG extended release capsule, TAKE ONE CAPSULE

## 2023-05-24 ENCOUNTER — TELEPHONE (OUTPATIENT)
Age: 43
End: 2023-05-24

## 2023-05-24 ENCOUNTER — TELEMEDICINE (OUTPATIENT)
Age: 43
End: 2023-05-24
Payer: COMMERCIAL

## 2023-05-24 DIAGNOSIS — R73.9 ELEVATED BLOOD SUGAR: ICD-10-CM

## 2023-05-24 DIAGNOSIS — E83.110 HEREDITARY HEMOCHROMATOSIS (HCC): ICD-10-CM

## 2023-05-24 DIAGNOSIS — E66.01 SEVERE OBESITY (BMI 35.0-39.9) WITH COMORBIDITY (HCC): Primary | ICD-10-CM

## 2023-05-24 PROCEDURE — 99214 OFFICE O/P EST MOD 30 MIN: CPT | Performed by: INTERNAL MEDICINE

## 2023-05-24 SDOH — ECONOMIC STABILITY: HOUSING INSECURITY
IN THE LAST 12 MONTHS, WAS THERE A TIME WHEN YOU DID NOT HAVE A STEADY PLACE TO SLEEP OR SLEPT IN A SHELTER (INCLUDING NOW)?: NO

## 2023-05-24 SDOH — ECONOMIC STABILITY: FOOD INSECURITY: WITHIN THE PAST 12 MONTHS, THE FOOD YOU BOUGHT JUST DIDN'T LAST AND YOU DIDN'T HAVE MONEY TO GET MORE.: NEVER TRUE

## 2023-05-24 SDOH — ECONOMIC STABILITY: FOOD INSECURITY: WITHIN THE PAST 12 MONTHS, YOU WORRIED THAT YOUR FOOD WOULD RUN OUT BEFORE YOU GOT MONEY TO BUY MORE.: NEVER TRUE

## 2023-05-24 SDOH — ECONOMIC STABILITY: INCOME INSECURITY: HOW HARD IS IT FOR YOU TO PAY FOR THE VERY BASICS LIKE FOOD, HOUSING, MEDICAL CARE, AND HEATING?: SOMEWHAT HARD

## 2023-05-28 LAB
ERYTHROCYTE [DISTWIDTH] IN BLOOD BY AUTOMATED COUNT: 11.9 % (ref 11.7–15.4)
FERRITIN SERPL-MCNC: 69 NG/ML (ref 15–150)
HCT VFR BLD AUTO: 38.3 % (ref 34–46.6)
HGB BLD-MCNC: 13.1 G/DL (ref 11.1–15.9)
IRON SATN MFR SERPL: 75 % (ref 15–55)
IRON SERPL-MCNC: 178 UG/DL (ref 27–159)
MCH RBC QN AUTO: 30.7 PG (ref 26.6–33)
MCHC RBC AUTO-ENTMCNC: 34.2 G/DL (ref 31.5–35.7)
MCV RBC AUTO: 90 FL (ref 79–97)
PLATELET # BLD AUTO: 287 X10E3/UL (ref 150–450)
RBC # BLD AUTO: 4.27 X10E6/UL (ref 3.77–5.28)
TIBC SERPL-MCNC: 236 UG/DL (ref 250–450)
UIBC SERPL-MCNC: 58 UG/DL (ref 131–425)
WBC # BLD AUTO: 6 X10E3/UL (ref 3.4–10.8)

## 2023-05-30 ENCOUNTER — TELEPHONE (OUTPATIENT)
Age: 43
End: 2023-05-30

## 2023-05-30 DIAGNOSIS — F90.9 ATTENTION DEFICIT HYPERACTIVITY DISORDER (ADHD), UNSPECIFIED ADHD TYPE: Primary | ICD-10-CM

## 2023-05-30 NOTE — TELEPHONE ENCOUNTER
----- Message from Kevan Luke LPN sent at 1/29/5584  9:11 AM EDT -----  Regarding: FW: Labs  Contact: [phone]    ----- Message -----  From: Rogers Bird  Sent: 5/30/2023   8:26 AM EDT  To: Jacobo Mendez Clinical Staff  Subject: Labs                                             Can you recommend a Hematologist? I do not have one.

## 2023-06-07 LAB
ALBUMIN SERPL-MCNC: 4.6 G/DL (ref 3.8–4.8)
ALBUMIN/GLOB SERPL: 2 {RATIO} (ref 1.2–2.2)
ALP SERPL-CCNC: 73 IU/L (ref 44–121)
ALT SERPL-CCNC: 18 IU/L (ref 0–32)
AST SERPL-CCNC: 13 IU/L (ref 0–40)
BILIRUB SERPL-MCNC: 1.6 MG/DL (ref 0–1.2)
BUN SERPL-MCNC: 14 MG/DL (ref 6–24)
BUN/CREAT SERPL: 23 (ref 9–23)
CALCIUM SERPL-MCNC: 9 MG/DL (ref 8.7–10.2)
CHLORIDE SERPL-SCNC: 104 MMOL/L (ref 96–106)
CO2 SERPL-SCNC: 23 MMOL/L (ref 20–29)
CREAT SERPL-MCNC: 0.62 MG/DL (ref 0.57–1)
EGFRCR SERPLBLD CKD-EPI 2021: 115 ML/MIN/1.73
GLOBULIN SER CALC-MCNC: 2.3 G/DL (ref 1.5–4.5)
GLUCOSE SERPL-MCNC: 96 MG/DL (ref 70–99)
HBA1C MFR BLD: 5.3 % (ref 4.8–5.6)
POTASSIUM SERPL-SCNC: 4.5 MMOL/L (ref 3.5–5.2)
PROT SERPL-MCNC: 6.9 G/DL (ref 6–8.5)
SODIUM SERPL-SCNC: 141 MMOL/L (ref 134–144)

## 2023-06-09 ENCOUNTER — TELEPHONE (OUTPATIENT)
Age: 43
End: 2023-06-09

## 2023-06-09 DIAGNOSIS — F90.9 ATTENTION DEFICIT HYPERACTIVITY DISORDER (ADHD), UNSPECIFIED ADHD TYPE: Primary | ICD-10-CM

## 2023-06-09 DIAGNOSIS — E66.01 SEVERE OBESITY (BMI 35.0-39.9) WITH COMORBIDITY (HCC): ICD-10-CM

## 2023-06-09 NOTE — TELEPHONE ENCOUNTER
----- Message from Ivan Wynn LPN sent at 2/2/3169 11:02 AM EDT -----  Regarding: FW: A1c lab results   Contact: [phone]    ----- Message -----  From: Rodney Oviedo  Sent: 6/8/2023   7:59 PM EDT  To: Roxane Mendez Clinical Staff  Subject: A1c lab results                                  A1C is normal since we discussed the medication options I verified coverage for Vvanyse for Adderall replacement just needs preauth then Sahil Back is covered and just needs preauth     Please let me know if you can send in the preauth for both and a perscription for the wegovy we discussed.      Thank you

## 2023-06-23 ENCOUNTER — TELEPHONE (OUTPATIENT)
Age: 43
End: 2023-06-23

## 2023-06-26 ENCOUNTER — TELEPHONE (OUTPATIENT)
Age: 43
End: 2023-06-26

## 2023-07-11 RX ORDER — DULOXETIN HYDROCHLORIDE 60 MG/1
CAPSULE, DELAYED RELEASE ORAL
Qty: 90 CAPSULE | Refills: 3 | Status: SHIPPED | OUTPATIENT
Start: 2023-07-11

## 2023-07-12 ENCOUNTER — TELEPHONE (OUTPATIENT)
Age: 43
End: 2023-07-12

## 2023-07-12 DIAGNOSIS — F90.9 ATTENTION DEFICIT HYPERACTIVITY DISORDER (ADHD), UNSPECIFIED ADHD TYPE: Primary | ICD-10-CM

## 2023-07-12 RX ORDER — METHYLPHENIDATE HYDROCHLORIDE 18 MG/1
18 TABLET ORAL DAILY
Qty: 30 TABLET | Refills: 0 | Status: SHIPPED | OUTPATIENT
Start: 2023-07-12 | End: 2023-08-11

## 2023-07-12 NOTE — TELEPHONE ENCOUNTER
----- Message from Joy [place], [place] sent at 7/12/2023  2:46 PM EDT -----  Regarding: FW: Vyvanse denied   Contact: [phone]    ----- Message -----  From: Ghanshyam Sal  Sent: 7/12/2023  12:57 PM EDT  To: Ronnie Santacruz  Clinical Staff  Subject: Vyvanse denied                                   Can you suggest an alternative to Vyvanse the Adderall is causing intense mood swings and irritability. Insurance is denying Vyvanse.

## 2023-07-13 ENCOUNTER — TELEPHONE (OUTPATIENT)
Age: 43
End: 2023-07-13

## 2023-07-13 DIAGNOSIS — D3A.8 NEUROENDOCRINE TUMOR: ICD-10-CM

## 2023-07-13 DIAGNOSIS — E83.110 HEREDITARY HEMOCHROMATOSIS (HCC): Primary | ICD-10-CM

## 2023-07-13 NOTE — TELEPHONE ENCOUNTER
----- Message from [employer], [place] sent at 7/12/2023  3:31 PM EDT -----  Regarding: FW: Referral request needed   Contact: [phone]    ----- Message -----  From: Rosemary Rogers  Sent: 7/12/2023   3:15 PM EDT  To: Aaron Mendez Clinical Staff  Subject: Referral request needed                          Can you please send a referral request for Dr. Lon Dacosta and or Dr. Romy Sotelo for  diagnosis of hemochromotosis and neuroendocrine tumor prior diagnosis. My iron levels are elevated and I'm just now getting to schedule. The insurance doesn't require referral but the office does. Thank you. I was told to ask that a note be put for priority so that I don't have to wait months to be seen.

## 2023-07-17 ENCOUNTER — INITIAL CONSULT (OUTPATIENT)
Age: 43
End: 2023-07-17
Payer: COMMERCIAL

## 2023-07-17 VITALS
RESPIRATION RATE: 18 BRPM | HEIGHT: 71 IN | TEMPERATURE: 97.7 F | WEIGHT: 293 LBS | BODY MASS INDEX: 41.02 KG/M2 | DIASTOLIC BLOOD PRESSURE: 82 MMHG | HEART RATE: 83 BPM | OXYGEN SATURATION: 95 % | SYSTOLIC BLOOD PRESSURE: 122 MMHG

## 2023-07-17 DIAGNOSIS — E83.110 HEREDITARY HEMOCHROMATOSIS (HCC): Primary | ICD-10-CM

## 2023-07-17 DIAGNOSIS — Z85.048 HISTORY OF RECTAL CANCER: ICD-10-CM

## 2023-07-17 PROCEDURE — 99204 OFFICE O/P NEW MOD 45 MIN: CPT | Performed by: INTERNAL MEDICINE

## 2023-07-17 ASSESSMENT — PATIENT HEALTH QUESTIONNAIRE - PHQ9
SUM OF ALL RESPONSES TO PHQ QUESTIONS 1-9: 0
SUM OF ALL RESPONSES TO PHQ9 QUESTIONS 1 & 2: 0
SUM OF ALL RESPONSES TO PHQ QUESTIONS 1-9: 0
SUM OF ALL RESPONSES TO PHQ QUESTIONS 1-9: 0
1. LITTLE INTEREST OR PLEASURE IN DOING THINGS: 0
SUM OF ALL RESPONSES TO PHQ QUESTIONS 1-9: 0
2. FEELING DOWN, DEPRESSED OR HOPELESS: 0

## 2023-07-19 DIAGNOSIS — Z85.048 HISTORY OF RECTAL CANCER: ICD-10-CM

## 2023-07-19 DIAGNOSIS — E83.110 HEREDITARY HEMOCHROMATOSIS (HCC): ICD-10-CM

## 2023-07-19 LAB
BASOPHILS # BLD: 0.1 K/UL (ref 0–0.1)
BASOPHILS NFR BLD: 1 % (ref 0–1)
DIFFERENTIAL METHOD BLD: NORMAL
EOSINOPHIL # BLD: 0.2 K/UL (ref 0–0.4)
EOSINOPHIL NFR BLD: 3 % (ref 0–7)
ERYTHROCYTE [DISTWIDTH] IN BLOOD BY AUTOMATED COUNT: 12.5 % (ref 11.5–14.5)
FERRITIN SERPL-MCNC: 65 NG/ML (ref 26–388)
HCT VFR BLD AUTO: 43 % (ref 35–47)
HGB BLD-MCNC: 14.1 G/DL (ref 11.5–16)
IMM GRANULOCYTES # BLD AUTO: 0 K/UL (ref 0–0.04)
IMM GRANULOCYTES NFR BLD AUTO: 0 % (ref 0–0.5)
IRON SATN MFR SERPL: 69 % (ref 20–50)
IRON SERPL-MCNC: 177 UG/DL (ref 35–150)
LYMPHOCYTES # BLD: 2.2 K/UL (ref 0.8–3.5)
LYMPHOCYTES NFR BLD: 25 % (ref 12–49)
MCH RBC QN AUTO: 30.2 PG (ref 26–34)
MCHC RBC AUTO-ENTMCNC: 32.8 G/DL (ref 30–36.5)
MCV RBC AUTO: 92.1 FL (ref 80–99)
MONOCYTES # BLD: 0.7 K/UL (ref 0–1)
MONOCYTES NFR BLD: 8 % (ref 5–13)
NEUTS SEG # BLD: 5.6 K/UL (ref 1.8–8)
NEUTS SEG NFR BLD: 63 % (ref 32–75)
NRBC # BLD: 0 K/UL (ref 0–0.01)
NRBC BLD-RTO: 0 PER 100 WBC
PLATELET # BLD AUTO: 280 K/UL (ref 150–400)
PMV BLD AUTO: 10.5 FL (ref 8.9–12.9)
RBC # BLD AUTO: 4.67 M/UL (ref 3.8–5.2)
TIBC SERPL-MCNC: 256 UG/DL (ref 250–450)
WBC # BLD AUTO: 8.7 K/UL (ref 3.6–11)

## 2023-07-27 ENCOUNTER — PATIENT MESSAGE (OUTPATIENT)
Age: 43
End: 2023-07-27

## 2023-07-27 ENCOUNTER — TELEPHONE (OUTPATIENT)
Age: 43
End: 2023-07-27

## 2023-07-27 NOTE — TELEPHONE ENCOUNTER
Patient called and stated that she would like a call back to receive the results of the tests she got done a few weeks ago.        # [phone]

## 2023-08-16 ENCOUNTER — TELEPHONE (OUTPATIENT)
Age: 43
End: 2023-08-16

## 2023-08-16 NOTE — TELEPHONE ENCOUNTER
Pt is calling about prior auth for the following medication Vyvance she has been trying to get this medication for a while now and would like a call back on this matter.

## 2023-08-18 ENCOUNTER — TELEPHONE (OUTPATIENT)
Age: 43
End: 2023-08-18

## 2023-10-26 DIAGNOSIS — F90.9 ATTENTION DEFICIT HYPERACTIVITY DISORDER (ADHD), UNSPECIFIED ADHD TYPE: ICD-10-CM

## 2023-10-26 RX ORDER — LISDEXAMFETAMINE DIMESYLATE 30 MG/1
30 CAPSULE ORAL DAILY
Qty: 30 CAPSULE | Refills: 0 | Status: SHIPPED | OUTPATIENT
Start: 2023-10-26 | End: 2023-11-25

## 2023-10-26 RX ORDER — LISDEXAMFETAMINE DIMESYLATE CAPSULES 30 MG/1
30 CAPSULE ORAL DAILY
Qty: 30 CAPSULE | Refills: 0 | OUTPATIENT
Start: 2023-10-26 | End: 2023-11-25

## 2023-10-26 NOTE — TELEPHONE ENCOUNTER
Refill request received from [facility] for   Requested Prescriptions     Pending Prescriptions Disp Refills    VYVANSE 30 MG capsule [Pharmacy Med Name: VYVANSE 30 MG CAP[$]] 30 capsule 0     Sig: Take 1 capsule by mouth daily. 5/24/2023   Visit date not found     Routed to Dr Constanza Bethea for review.

## 2023-10-26 NOTE — TELEPHONE ENCOUNTER
Refill request received from [facility] for   Requested Prescriptions     Pending Prescriptions Disp Refills    lisdexamfetamine (VYVANSE) 30 MG capsule 30 capsule 0     Sig: Take 1 capsule by mouth daily for 30 days. Max Daily Amount: 30 mg     5/24/2023   Visit date not found     Routed to Dr Karen Serrano for review.

## (undated) DEVICE — SOLUTION IRRIG 3000ML 0.9% SOD CHL USP UROMATIC PLAS CONT

## (undated) DEVICE — TUBING, SUCTION, 1/4" X 12', STRAIGHT: Brand: MEDLINE

## (undated) DEVICE — INTENDED FOR TISSUE SEPARATION, AND OTHER PROCEDURES THAT REQUIRE A SHARP SURGICAL BLADE TO PUNCTURE OR CUT.: Brand: BARD-PARKER ® CARBON RIB-BACK BLADES

## (undated) DEVICE — PADDING,UNDERCAST,COTTON, 3X4YD STERILE: Brand: MEDLINE

## (undated) DEVICE — OPEN-END URETERAL CATHETER: Brand: COOK

## (undated) DEVICE — SUT VCRL + 2-0 36IN CT1 UD --

## (undated) DEVICE — DRAPE SURG W10XL20IN E OPN CIR BND BG

## (undated) DEVICE — BANDAGE COBAN 4 IN COMPR W4INXL5YD FOAM COHESIVE QUIK STK SELF ADH SFT

## (undated) DEVICE — GLOVE ORANGE PI 8   MSG9080

## (undated) DEVICE — SOLUTION IRRIG 1000ML STRL H2O USP PLAS POUR BTL

## (undated) DEVICE — SUT ETHLN 4-0 18IN FS2 BLK --

## (undated) DEVICE — PADDING CAST W4INXL4YD ST COT COHESIVE HND TEARABLE SPEC

## (undated) DEVICE — SYR 10ML LUER LOK 1/5ML GRAD --

## (undated) DEVICE — SYSTEM SKIN CLSR 22CM DERMBND PRINEO

## (undated) DEVICE — MINOR EXTREMITY PACK: Brand: MEDLINE INDUSTRIES, INC.

## (undated) DEVICE — SUT VCRL + 1 36IN CT1 VIO --

## (undated) DEVICE — BASIC SINGLE BASIN-LF: Brand: MEDLINE INDUSTRIES, INC.

## (undated) DEVICE — DRAPE,ORTHOMAX,EXTREMITY: Brand: MEDLINE

## (undated) DEVICE — KIT INSTR DRL AND SPEAR TRCR DISP FOR 3MM SUTURETAK

## (undated) DEVICE — 3M™ STERI-DRAPE™ U-DRAPE 1015: Brand: STERI-DRAPE™

## (undated) DEVICE — SUTURE VCRL SZ 2-0 L27IN ABSRB UD L26MM CT-2 1/2 CIR J269H

## (undated) DEVICE — PREP SKN CHLRAPRP APL 26ML STR --

## (undated) DEVICE — SLING ARM ENV PD DLX LG BLU --

## (undated) DEVICE — 1010 S-DRAPE TOWEL DRAPE 10/BX: Brand: STERI-DRAPE™

## (undated) DEVICE — INTENT TO BE USED WITH SUTURE MATERIAL FOR TISSUE CLOSURE: Brand: RICHARD-ALLAN® NEEDLE 1/2 CIRCLE TAPER

## (undated) DEVICE — COVER LT HNDL BLU PLAS

## (undated) DEVICE — SOLUTION IRRIGATION H2O 0797305] ICU MEDICAL INC]

## (undated) DEVICE — DRESSING GZ W3XL16IN CELOS ACETT OIL EMUL N ADH

## (undated) DEVICE — SOUTHSIDE TURNOVER: Brand: MEDLINE INDUSTRIES, INC.

## (undated) DEVICE — CYSTO-SMH: Brand: MEDLINE INDUSTRIES, INC.

## (undated) DEVICE — SUT MONOCRYL PLUS UD 4-0 --

## (undated) DEVICE — FLEXOR, URETERAL ACCESS SHEATH WITH AQ, HYDROPHILIC COATING: Brand: FLEXOR

## (undated) DEVICE — GOWN,SIRUS,POLYRNF,BRTHSLV,XL,30/CS: Brand: MEDLINE

## (undated) DEVICE — SUTURE ABSORBABLE BRAIDED 2-0 CP2 27 IN UD VICRYL + VCP869H

## (undated) DEVICE — SPONGE GZ W4XL4IN COT 12 PLY TYP VII WVN C FLD DSGN

## (undated) DEVICE — GDWIRE UROL STR 150CM FLX TP -- BX/5 SENSOR

## (undated) DEVICE — GARMENT,MEDLINE,DVT,INT,CALF,MED, GEN2: Brand: MEDLINE